# Patient Record
Sex: FEMALE | Race: WHITE | URBAN - METROPOLITAN AREA
[De-identification: names, ages, dates, MRNs, and addresses within clinical notes are randomized per-mention and may not be internally consistent; named-entity substitution may affect disease eponyms.]

---

## 2017-03-16 ENCOUNTER — OUTPATIENT (OUTPATIENT)
Dept: OUTPATIENT SERVICES | Facility: HOSPITAL | Age: 67
LOS: 1 days | Discharge: HOME | End: 2017-03-16

## 2017-06-27 DIAGNOSIS — S72.021A DISPLACED FRACTURE OF EPIPHYSIS (SEPARATION) (UPPER) OF RIGHT FEMUR, INITIAL ENCOUNTER FOR CLOSED FRACTURE: ICD-10-CM

## 2018-12-19 ENCOUNTER — OUTPATIENT (OUTPATIENT)
Dept: OUTPATIENT SERVICES | Facility: HOSPITAL | Age: 68
LOS: 1 days | Discharge: HOME | End: 2018-12-19

## 2018-12-19 VITALS
RESPIRATION RATE: 18 BRPM | HEART RATE: 68 BPM | DIASTOLIC BLOOD PRESSURE: 82 MMHG | TEMPERATURE: 97 F | WEIGHT: 171.96 LBS | HEIGHT: 68 IN | SYSTOLIC BLOOD PRESSURE: 179 MMHG | OXYGEN SATURATION: 97 %

## 2018-12-19 DIAGNOSIS — Z98.890 OTHER SPECIFIED POSTPROCEDURAL STATES: Chronic | ICD-10-CM

## 2018-12-19 DIAGNOSIS — Z01.818 ENCOUNTER FOR OTHER PREPROCEDURAL EXAMINATION: ICD-10-CM

## 2018-12-19 LAB
ALBUMIN SERPL ELPH-MCNC: 4.3 G/DL — SIGNIFICANT CHANGE UP (ref 3.5–5.2)
ALP SERPL-CCNC: 80 U/L — SIGNIFICANT CHANGE UP (ref 30–115)
ALT FLD-CCNC: 12 U/L — SIGNIFICANT CHANGE UP (ref 0–41)
ANION GAP SERPL CALC-SCNC: 18 MMOL/L — HIGH (ref 7–14)
APPEARANCE UR: CLEAR — SIGNIFICANT CHANGE UP
APTT BLD: 31.2 SEC — SIGNIFICANT CHANGE UP (ref 27–39.2)
AST SERPL-CCNC: 13 U/L — SIGNIFICANT CHANGE UP (ref 0–41)
BASOPHILS # BLD AUTO: 0.07 K/UL — SIGNIFICANT CHANGE UP (ref 0–0.2)
BASOPHILS NFR BLD AUTO: 0.8 % — SIGNIFICANT CHANGE UP (ref 0–1)
BILIRUB SERPL-MCNC: 0.3 MG/DL — SIGNIFICANT CHANGE UP (ref 0.2–1.2)
BILIRUB UR-MCNC: NEGATIVE — SIGNIFICANT CHANGE UP
BLD GP AB SCN SERPL QL: SIGNIFICANT CHANGE UP
BUN SERPL-MCNC: 11 MG/DL — SIGNIFICANT CHANGE UP (ref 10–20)
CALCIUM SERPL-MCNC: 9.2 MG/DL — SIGNIFICANT CHANGE UP (ref 8.5–10.1)
CHLORIDE SERPL-SCNC: 100 MMOL/L — SIGNIFICANT CHANGE UP (ref 98–110)
CO2 SERPL-SCNC: 22 MMOL/L — SIGNIFICANT CHANGE UP (ref 17–32)
COLOR SPEC: YELLOW — SIGNIFICANT CHANGE UP
CREAT SERPL-MCNC: 0.5 MG/DL — LOW (ref 0.7–1.5)
DIFF PNL FLD: ABNORMAL
EOSINOPHIL # BLD AUTO: 0.16 K/UL — SIGNIFICANT CHANGE UP (ref 0–0.7)
EOSINOPHIL NFR BLD AUTO: 1.8 % — SIGNIFICANT CHANGE UP (ref 0–8)
EPI CELLS # UR: ABNORMAL /HPF
ESTIMATED AVERAGE GLUCOSE: 163 MG/DL — HIGH (ref 68–114)
GLUCOSE SERPL-MCNC: 116 MG/DL — HIGH (ref 70–99)
GLUCOSE UR QL: NEGATIVE MG/DL — SIGNIFICANT CHANGE UP
HBA1C BLD-MCNC: 7.3 % — HIGH (ref 4–5.6)
HCT VFR BLD CALC: 36.9 % — LOW (ref 37–47)
HGB BLD-MCNC: 11.9 G/DL — LOW (ref 12–16)
IMM GRANULOCYTES NFR BLD AUTO: 0.3 % — SIGNIFICANT CHANGE UP (ref 0.1–0.3)
INR BLD: 1.05 RATIO — SIGNIFICANT CHANGE UP (ref 0.65–1.3)
KETONES UR-MCNC: NEGATIVE — SIGNIFICANT CHANGE UP
LEUKOCYTE ESTERASE UR-ACNC: ABNORMAL
LYMPHOCYTES # BLD AUTO: 2.05 K/UL — SIGNIFICANT CHANGE UP (ref 1.2–3.4)
LYMPHOCYTES # BLD AUTO: 22.8 % — SIGNIFICANT CHANGE UP (ref 20.5–51.1)
MCHC RBC-ENTMCNC: 23.7 PG — LOW (ref 27–31)
MCHC RBC-ENTMCNC: 32.2 G/DL — SIGNIFICANT CHANGE UP (ref 32–37)
MCV RBC AUTO: 73.4 FL — LOW (ref 81–99)
MONOCYTES # BLD AUTO: 0.54 K/UL — SIGNIFICANT CHANGE UP (ref 0.1–0.6)
MONOCYTES NFR BLD AUTO: 6 % — SIGNIFICANT CHANGE UP (ref 1.7–9.3)
MRSA PCR RESULT.: NEGATIVE — SIGNIFICANT CHANGE UP
NEUTROPHILS # BLD AUTO: 6.13 K/UL — SIGNIFICANT CHANGE UP (ref 1.4–6.5)
NEUTROPHILS NFR BLD AUTO: 68.3 % — SIGNIFICANT CHANGE UP (ref 42.2–75.2)
NITRITE UR-MCNC: NEGATIVE — SIGNIFICANT CHANGE UP
NRBC # BLD: 0 /100 WBCS — SIGNIFICANT CHANGE UP (ref 0–0)
PH UR: 6 — SIGNIFICANT CHANGE UP (ref 5–8)
PLATELET # BLD AUTO: 302 K/UL — SIGNIFICANT CHANGE UP (ref 130–400)
POTASSIUM SERPL-MCNC: 4.3 MMOL/L — SIGNIFICANT CHANGE UP (ref 3.5–5)
POTASSIUM SERPL-SCNC: 4.3 MMOL/L — SIGNIFICANT CHANGE UP (ref 3.5–5)
PROT SERPL-MCNC: 6.6 G/DL — SIGNIFICANT CHANGE UP (ref 6–8)
PROT UR-MCNC: 100 MG/DL
PROTHROM AB SERPL-ACNC: 12.1 SEC — SIGNIFICANT CHANGE UP (ref 9.95–12.87)
RBC # BLD: 5.03 M/UL — SIGNIFICANT CHANGE UP (ref 4.2–5.4)
RBC # FLD: 15.3 % — HIGH (ref 11.5–14.5)
RBC CASTS # UR COMP ASSIST: ABNORMAL /HPF
SODIUM SERPL-SCNC: 140 MMOL/L — SIGNIFICANT CHANGE UP (ref 135–146)
SP GR SPEC: 1.02 — SIGNIFICANT CHANGE UP (ref 1.01–1.03)
TYPE + AB SCN PNL BLD: SIGNIFICANT CHANGE UP
UROBILINOGEN FLD QL: 0.2 MG/DL — SIGNIFICANT CHANGE UP (ref 0.2–0.2)
WBC # BLD: 8.98 K/UL — SIGNIFICANT CHANGE UP (ref 4.8–10.8)
WBC # FLD AUTO: 8.98 K/UL — SIGNIFICANT CHANGE UP (ref 4.8–10.8)
WBC UR QL: SIGNIFICANT CHANGE UP /HPF

## 2018-12-19 NOTE — H&P PST ADULT - PSH
History of joint surgery  Right HIP ORIF  [facility] at [place]  History of tonsillectomy and adenoidectomy  1957 History of joint surgery  Right HIP ORIF  [facility] at [place]  History of tonsillectomy and adenoidectomy  1957

## 2018-12-19 NOTE — H&P PST ADULT - NSANTHOSAYNRD_GEN_A_CORE
No. DRAKE screening performed.  STOP BANG Legend: 0-2 = LOW Risk; 3-4 = INTERMEDIATE Risk; 5-8 = HIGH Risk

## 2018-12-19 NOTE — H&P PST ADULT - REASON FOR ADMISSION
67 yo f presents to PAST for right Hip removal of hardware conversion to Total Hip Replacement under GA on 1/2/2018 at [facility] by Dr. Dena Noel.

## 2018-12-19 NOTE — H&P PST ADULT - HISTORY OF PRESENT ILLNESS
Patient with h/o fall on 12/4/2016 and s/p ORIF done on 12/8/2016. Patient has been c/o progressively worsening same right Hip pain with limited ROM with swelling noted. Hip X michele shown severe DJD and scheduled for the above procedure. Denies any c/o cp, sob, palpitations fever, cough or dysuria. Ex tolerance of 1 fos walk with out sob except right Hip pains. Patient ambulate with cane and walker. No DRAKE.

## 2018-12-20 LAB
CULTURE RESULTS: NO GROWTH — SIGNIFICANT CHANGE UP
SPECIMEN SOURCE: SIGNIFICANT CHANGE UP

## 2019-01-02 ENCOUNTER — INPATIENT (INPATIENT)
Facility: HOSPITAL | Age: 69
LOS: 1 days | Discharge: HOME HEALTH | End: 2019-01-04
Attending: ORTHOPAEDIC SURGERY | Admitting: ORTHOPAEDIC SURGERY

## 2019-01-02 VITALS
HEIGHT: 68 IN | DIASTOLIC BLOOD PRESSURE: 89 MMHG | WEIGHT: 171.08 LBS | HEART RATE: 68 BPM | RESPIRATION RATE: 18 BRPM | SYSTOLIC BLOOD PRESSURE: 184 MMHG | TEMPERATURE: 98 F

## 2019-01-02 DIAGNOSIS — Z98.890 OTHER SPECIFIED POSTPROCEDURAL STATES: Chronic | ICD-10-CM

## 2019-01-02 LAB
ANION GAP SERPL CALC-SCNC: 16 MMOL/L — HIGH (ref 7–14)
BUN SERPL-MCNC: 12 MG/DL — SIGNIFICANT CHANGE UP (ref 10–20)
CALCIUM SERPL-MCNC: 8.7 MG/DL — SIGNIFICANT CHANGE UP (ref 8.5–10.1)
CHLORIDE SERPL-SCNC: 99 MMOL/L — SIGNIFICANT CHANGE UP (ref 98–110)
CO2 SERPL-SCNC: 23 MMOL/L — SIGNIFICANT CHANGE UP (ref 17–32)
CREAT SERPL-MCNC: 0.6 MG/DL — LOW (ref 0.7–1.5)
GLUCOSE SERPL-MCNC: 159 MG/DL — HIGH (ref 70–99)
HCT VFR BLD CALC: 33.5 % — LOW (ref 37–47)
HGB BLD-MCNC: 10.8 G/DL — LOW (ref 12–16)
MCHC RBC-ENTMCNC: 23.7 PG — LOW (ref 27–31)
MCHC RBC-ENTMCNC: 32.2 G/DL — SIGNIFICANT CHANGE UP (ref 32–37)
MCV RBC AUTO: 73.5 FL — LOW (ref 81–99)
NRBC # BLD: 0 /100 WBCS — SIGNIFICANT CHANGE UP (ref 0–0)
PLATELET # BLD AUTO: 284 K/UL — SIGNIFICANT CHANGE UP (ref 130–400)
POTASSIUM SERPL-MCNC: 4.6 MMOL/L — SIGNIFICANT CHANGE UP (ref 3.5–5)
POTASSIUM SERPL-SCNC: 4.6 MMOL/L — SIGNIFICANT CHANGE UP (ref 3.5–5)
RBC # BLD: 4.56 M/UL — SIGNIFICANT CHANGE UP (ref 4.2–5.4)
RBC # FLD: 14.3 % — SIGNIFICANT CHANGE UP (ref 11.5–14.5)
SODIUM SERPL-SCNC: 138 MMOL/L — SIGNIFICANT CHANGE UP (ref 135–146)
WBC # BLD: 10.37 K/UL — SIGNIFICANT CHANGE UP (ref 4.8–10.8)
WBC # FLD AUTO: 10.37 K/UL — SIGNIFICANT CHANGE UP (ref 4.8–10.8)

## 2019-01-02 RX ORDER — GLUCAGON INJECTION, SOLUTION 0.5 MG/.1ML
1 INJECTION, SOLUTION SUBCUTANEOUS ONCE
Qty: 0 | Refills: 0 | Status: DISCONTINUED | OUTPATIENT
Start: 2019-01-02 | End: 2019-01-04

## 2019-01-02 RX ORDER — SODIUM CHLORIDE 9 MG/ML
1000 INJECTION, SOLUTION INTRAVENOUS
Qty: 0 | Refills: 0 | Status: DISCONTINUED | OUTPATIENT
Start: 2019-01-02 | End: 2019-01-02

## 2019-01-02 RX ORDER — ACETAMINOPHEN 500 MG
650 TABLET ORAL EVERY 6 HOURS
Qty: 0 | Refills: 0 | Status: DISCONTINUED | OUTPATIENT
Start: 2019-01-02 | End: 2019-01-04

## 2019-01-02 RX ORDER — SENNA PLUS 8.6 MG/1
2 TABLET ORAL AT BEDTIME
Qty: 0 | Refills: 0 | Status: DISCONTINUED | OUTPATIENT
Start: 2019-01-02 | End: 2019-01-04

## 2019-01-02 RX ORDER — METFORMIN HYDROCHLORIDE 850 MG/1
1 TABLET ORAL
Qty: 0 | Refills: 0 | COMMUNITY

## 2019-01-02 RX ORDER — HYDROCHLOROTHIAZIDE 25 MG
25 TABLET ORAL DAILY
Qty: 0 | Refills: 0 | Status: DISCONTINUED | OUTPATIENT
Start: 2019-01-02 | End: 2019-01-04

## 2019-01-02 RX ORDER — DEXTROSE 50 % IN WATER 50 %
15 SYRINGE (ML) INTRAVENOUS ONCE
Qty: 0 | Refills: 0 | Status: DISCONTINUED | OUTPATIENT
Start: 2019-01-02 | End: 2019-01-04

## 2019-01-02 RX ORDER — GABAPENTIN 400 MG/1
100 CAPSULE ORAL THREE TIMES A DAY
Qty: 0 | Refills: 0 | Status: DISCONTINUED | OUTPATIENT
Start: 2019-01-02 | End: 2019-01-04

## 2019-01-02 RX ORDER — MAGNESIUM HYDROXIDE 400 MG/1
30 TABLET, CHEWABLE ORAL DAILY
Qty: 0 | Refills: 0 | Status: DISCONTINUED | OUTPATIENT
Start: 2019-01-02 | End: 2019-01-04

## 2019-01-02 RX ORDER — ONDANSETRON 8 MG/1
4 TABLET, FILM COATED ORAL ONCE
Qty: 0 | Refills: 0 | Status: DISCONTINUED | OUTPATIENT
Start: 2019-01-02 | End: 2019-01-02

## 2019-01-02 RX ORDER — DEXTROSE 50 % IN WATER 50 %
25 SYRINGE (ML) INTRAVENOUS ONCE
Qty: 0 | Refills: 0 | Status: DISCONTINUED | OUTPATIENT
Start: 2019-01-02 | End: 2019-01-04

## 2019-01-02 RX ORDER — POLYETHYLENE GLYCOL 3350 17 G/17G
17 POWDER, FOR SOLUTION ORAL DAILY
Qty: 0 | Refills: 0 | Status: DISCONTINUED | OUTPATIENT
Start: 2019-01-02 | End: 2019-01-04

## 2019-01-02 RX ORDER — HYDROMORPHONE HYDROCHLORIDE 2 MG/ML
0.5 INJECTION INTRAMUSCULAR; INTRAVENOUS; SUBCUTANEOUS EVERY 4 HOURS
Qty: 0 | Refills: 0 | Status: DISCONTINUED | OUTPATIENT
Start: 2019-01-02 | End: 2019-01-04

## 2019-01-02 RX ORDER — METOPROLOL TARTRATE 50 MG
100 TABLET ORAL DAILY
Qty: 0 | Refills: 0 | Status: DISCONTINUED | OUTPATIENT
Start: 2019-01-02 | End: 2019-01-04

## 2019-01-02 RX ORDER — METOPROLOL TARTRATE 50 MG
1 TABLET ORAL
Qty: 0 | Refills: 0 | COMMUNITY

## 2019-01-02 RX ORDER — INSULIN LISPRO 100/ML
VIAL (ML) SUBCUTANEOUS
Qty: 0 | Refills: 0 | Status: DISCONTINUED | OUTPATIENT
Start: 2019-01-02 | End: 2019-01-04

## 2019-01-02 RX ORDER — PANTOPRAZOLE SODIUM 20 MG/1
40 TABLET, DELAYED RELEASE ORAL
Qty: 0 | Refills: 0 | Status: DISCONTINUED | OUTPATIENT
Start: 2019-01-02 | End: 2019-01-04

## 2019-01-02 RX ORDER — OXYCODONE HYDROCHLORIDE 5 MG/1
5 TABLET ORAL EVERY 4 HOURS
Qty: 0 | Refills: 0 | Status: DISCONTINUED | OUTPATIENT
Start: 2019-01-02 | End: 2019-01-04

## 2019-01-02 RX ORDER — SIMVASTATIN 20 MG/1
10 TABLET, FILM COATED ORAL AT BEDTIME
Qty: 0 | Refills: 0 | Status: DISCONTINUED | OUTPATIENT
Start: 2019-01-02 | End: 2019-01-04

## 2019-01-02 RX ORDER — ONDANSETRON 8 MG/1
4 TABLET, FILM COATED ORAL EVERY 6 HOURS
Qty: 0 | Refills: 0 | Status: DISCONTINUED | OUTPATIENT
Start: 2019-01-02 | End: 2019-01-04

## 2019-01-02 RX ORDER — SODIUM CHLORIDE 9 MG/ML
1000 INJECTION, SOLUTION INTRAVENOUS
Qty: 0 | Refills: 0 | Status: DISCONTINUED | OUTPATIENT
Start: 2019-01-02 | End: 2019-01-03

## 2019-01-02 RX ORDER — SODIUM CHLORIDE 9 MG/ML
1000 INJECTION, SOLUTION INTRAVENOUS
Qty: 0 | Refills: 0 | Status: DISCONTINUED | OUTPATIENT
Start: 2019-01-02 | End: 2019-01-04

## 2019-01-02 RX ORDER — CEFAZOLIN SODIUM 1 G
2000 VIAL (EA) INJECTION EVERY 8 HOURS
Qty: 0 | Refills: 0 | Status: COMPLETED | OUTPATIENT
Start: 2019-01-02 | End: 2019-01-02

## 2019-01-02 RX ORDER — DEXTROSE 50 % IN WATER 50 %
12.5 SYRINGE (ML) INTRAVENOUS ONCE
Qty: 0 | Refills: 0 | Status: DISCONTINUED | OUTPATIENT
Start: 2019-01-02 | End: 2019-01-04

## 2019-01-02 RX ORDER — HYDROMORPHONE HYDROCHLORIDE 2 MG/ML
0.5 INJECTION INTRAMUSCULAR; INTRAVENOUS; SUBCUTANEOUS
Qty: 0 | Refills: 0 | Status: DISCONTINUED | OUTPATIENT
Start: 2019-01-02 | End: 2019-01-02

## 2019-01-02 RX ORDER — OXYCODONE HYDROCHLORIDE 5 MG/1
10 TABLET ORAL EVERY 4 HOURS
Qty: 0 | Refills: 0 | Status: DISCONTINUED | OUTPATIENT
Start: 2019-01-02 | End: 2019-01-04

## 2019-01-02 RX ORDER — ASPIRIN/CALCIUM CARB/MAGNESIUM 324 MG
81 TABLET ORAL
Qty: 0 | Refills: 0 | Status: DISCONTINUED | OUTPATIENT
Start: 2019-01-02 | End: 2019-01-04

## 2019-01-02 RX ORDER — HYDROMORPHONE HYDROCHLORIDE 2 MG/ML
1 INJECTION INTRAMUSCULAR; INTRAVENOUS; SUBCUTANEOUS
Qty: 0 | Refills: 0 | Status: DISCONTINUED | OUTPATIENT
Start: 2019-01-02 | End: 2019-01-02

## 2019-01-02 RX ORDER — DOCUSATE SODIUM 100 MG
100 CAPSULE ORAL THREE TIMES A DAY
Qty: 0 | Refills: 0 | Status: DISCONTINUED | OUTPATIENT
Start: 2019-01-02 | End: 2019-01-04

## 2019-01-02 RX ADMIN — Medication 100 MILLIGRAM(S): at 14:24

## 2019-01-02 RX ADMIN — Medication 25 MILLIGRAM(S): at 14:24

## 2019-01-02 RX ADMIN — GABAPENTIN 100 MILLIGRAM(S): 400 CAPSULE ORAL at 14:07

## 2019-01-02 RX ADMIN — Medication 100 MILLIGRAM(S): at 21:17

## 2019-01-02 RX ADMIN — SIMVASTATIN 10 MILLIGRAM(S): 20 TABLET, FILM COATED ORAL at 21:17

## 2019-01-02 RX ADMIN — Medication 100 MILLIGRAM(S): at 14:07

## 2019-01-02 RX ADMIN — Medication 81 MILLIGRAM(S): at 17:02

## 2019-01-02 RX ADMIN — SODIUM CHLORIDE 100 MILLILITER(S): 9 INJECTION, SOLUTION INTRAVENOUS at 13:26

## 2019-01-02 RX ADMIN — Medication 650 MILLIGRAM(S): at 23:40

## 2019-01-02 RX ADMIN — SENNA PLUS 2 TABLET(S): 8.6 TABLET ORAL at 21:17

## 2019-01-02 RX ADMIN — HYDROMORPHONE HYDROCHLORIDE 1 MILLIGRAM(S): 2 INJECTION INTRAMUSCULAR; INTRAVENOUS; SUBCUTANEOUS at 13:15

## 2019-01-02 RX ADMIN — HYDROMORPHONE HYDROCHLORIDE 1 MILLIGRAM(S): 2 INJECTION INTRAMUSCULAR; INTRAVENOUS; SUBCUTANEOUS at 13:00

## 2019-01-02 RX ADMIN — GABAPENTIN 100 MILLIGRAM(S): 400 CAPSULE ORAL at 21:17

## 2019-01-02 RX ADMIN — Medication 1: at 17:02

## 2019-01-02 NOTE — PATIENT PROFILE ADULT - FALL HARM RISK
surgery/bones(Osteoporosis,prev fx,steroid use,metastatic bone ca)/coagulation(Bleeding disorder R/T clinical cond/anti-coags)

## 2019-01-02 NOTE — BRIEF OPERATIVE NOTE - PROCEDURE
<<-----Click on this checkbox to enter Procedure Total hip [facility]  01[facility]02/2019    [facility]  [facility]  01/02/2019    Active  [facility]

## 2019-01-02 NOTE — CHART NOTE - NSCHARTNOTEFT_GEN_A_CORE
PACU ANESTHESIA ADMISSION NOTE        __x__  Patent Airway    __x__  Full return of protective reflexes    __x__  Full recovery from anesthesia / back to baseline status      Mental Status:  __x__ Awake   ___x__ Alert   _____ Drowsy   _____ Sedated    Nausea/Vomiting:  __x__ NO  ______Yes,   See Post - Op Orders          Pain Scale (0-10):  _____    Treatment: ____ None    __x__ See Post - Op/PCA Orders    Post - Operative Fluids:   ____ Oral   __x__ See Post - Op Orders    Plan: Discharge:   ____Home       ____x_Floor     _____Critical Care    _____  Other:_________________    Comments: Patient had smooth intraoperative event, no anesthesia complication.  PACU Vital signs: HR:   92         BP:     148   /92          RR:      16       O2 Sat:     97  %     Temp 36

## 2019-01-02 NOTE — BRIEF OPERATIVE NOTE - COMMENTS
depuy implants, 52 cup, 6.5mm bone screw, 36 neutral liner, size 5 actis stem std offset, 36 +8.5 femoral head

## 2019-01-02 NOTE — ASU PATIENT PROFILE, ADULT - PSH
History of joint surgery  Right HIP ORIF  [facility] at [place]  History of tonsillectomy and adenoidectomy  1957

## 2019-01-02 NOTE — PROGRESS NOTE ADULT - ASSESSMENT
ORTHO POST OP CHECK    67 yo F s/p R hip removal of hardware, total hip arthroplasty. Patient seen and examined at bedside post-procedure. she is doing well with pain well controlled. Denies f/c/cp/sob/n/t/nausea/vomiting.    vitals: AF, VSS    PE:  NAD  unlabored resp  RLE:  dressings c/d/i  thigh and calf soft, compressible  SILT sp/dp/s/s/t  Motor intact ehl/fhl/ta/gs  foot wwp, cr <2s    H/H: 11.9/36.9    A/P: 67 yo F s/p R hip removal of hardware, total hip arthroplasty  - pain control  - IV Ancef x 24 hours post-op  - DVT ppx: chem ppx, foot pumps  - f/u AM labs  - WBAT RLE  - PT/OT/OOB  - IS  - diet  - Care per Ortho  - Dispo/SW

## 2019-01-03 ENCOUNTER — TRANSCRIPTION ENCOUNTER (OUTPATIENT)
Age: 69
End: 2019-01-03

## 2019-01-03 LAB
ANION GAP SERPL CALC-SCNC: 16 MMOL/L — HIGH (ref 7–14)
BUN SERPL-MCNC: 10 MG/DL — SIGNIFICANT CHANGE UP (ref 10–20)
CALCIUM SERPL-MCNC: 9.1 MG/DL — SIGNIFICANT CHANGE UP (ref 8.5–10.1)
CHLORIDE SERPL-SCNC: 94 MMOL/L — LOW (ref 98–110)
CO2 SERPL-SCNC: 26 MMOL/L — SIGNIFICANT CHANGE UP (ref 17–32)
CREAT SERPL-MCNC: 0.6 MG/DL — LOW (ref 0.7–1.5)
GLUCOSE SERPL-MCNC: 136 MG/DL — HIGH (ref 70–99)
HCT VFR BLD CALC: 30.1 % — LOW (ref 37–47)
HGB BLD-MCNC: 9.7 G/DL — LOW (ref 12–16)
MCHC RBC-ENTMCNC: 23.5 PG — LOW (ref 27–31)
MCHC RBC-ENTMCNC: 32.2 G/DL — SIGNIFICANT CHANGE UP (ref 32–37)
MCV RBC AUTO: 72.9 FL — LOW (ref 81–99)
NRBC # BLD: 0 /100 WBCS — SIGNIFICANT CHANGE UP (ref 0–0)
PLATELET # BLD AUTO: 287 K/UL — SIGNIFICANT CHANGE UP (ref 130–400)
POTASSIUM SERPL-MCNC: 4.1 MMOL/L — SIGNIFICANT CHANGE UP (ref 3.5–5)
POTASSIUM SERPL-SCNC: 4.1 MMOL/L — SIGNIFICANT CHANGE UP (ref 3.5–5)
RBC # BLD: 4.13 M/UL — LOW (ref 4.2–5.4)
RBC # FLD: 14.3 % — SIGNIFICANT CHANGE UP (ref 11.5–14.5)
SODIUM SERPL-SCNC: 136 MMOL/L — SIGNIFICANT CHANGE UP (ref 135–146)
WBC # BLD: 9.54 K/UL — SIGNIFICANT CHANGE UP (ref 4.8–10.8)
WBC # FLD AUTO: 9.54 K/UL — SIGNIFICANT CHANGE UP (ref 4.8–10.8)

## 2019-01-03 RX ADMIN — Medication 81 MILLIGRAM(S): at 05:12

## 2019-01-03 RX ADMIN — Medication 3: at 17:15

## 2019-01-03 RX ADMIN — Medication 1 TABLET(S): at 11:40

## 2019-01-03 RX ADMIN — PANTOPRAZOLE SODIUM 40 MILLIGRAM(S): 20 TABLET, DELAYED RELEASE ORAL at 05:13

## 2019-01-03 RX ADMIN — POLYETHYLENE GLYCOL 3350 17 GRAM(S): 17 POWDER, FOR SOLUTION ORAL at 11:41

## 2019-01-03 RX ADMIN — GABAPENTIN 100 MILLIGRAM(S): 400 CAPSULE ORAL at 05:12

## 2019-01-03 RX ADMIN — Medication 3: at 11:39

## 2019-01-03 RX ADMIN — Medication 650 MILLIGRAM(S): at 17:15

## 2019-01-03 RX ADMIN — Medication 650 MILLIGRAM(S): at 05:13

## 2019-01-03 RX ADMIN — Medication 650 MILLIGRAM(S): at 17:23

## 2019-01-03 RX ADMIN — Medication 1: at 08:42

## 2019-01-03 RX ADMIN — GABAPENTIN 100 MILLIGRAM(S): 400 CAPSULE ORAL at 21:30

## 2019-01-03 RX ADMIN — Medication 100 MILLIGRAM(S): at 05:14

## 2019-01-03 RX ADMIN — SIMVASTATIN 10 MILLIGRAM(S): 20 TABLET, FILM COATED ORAL at 21:30

## 2019-01-03 RX ADMIN — Medication 650 MILLIGRAM(S): at 11:41

## 2019-01-03 RX ADMIN — Medication 100 MILLIGRAM(S): at 05:12

## 2019-01-03 RX ADMIN — Medication 100 MILLIGRAM(S): at 13:37

## 2019-01-03 RX ADMIN — Medication 650 MILLIGRAM(S): at 11:40

## 2019-01-03 RX ADMIN — Medication 25 MILLIGRAM(S): at 05:12

## 2019-01-03 RX ADMIN — Medication 100 MILLIGRAM(S): at 21:30

## 2019-01-03 RX ADMIN — Medication 81 MILLIGRAM(S): at 17:16

## 2019-01-03 RX ADMIN — SENNA PLUS 2 TABLET(S): 8.6 TABLET ORAL at 21:30

## 2019-01-03 RX ADMIN — GABAPENTIN 100 MILLIGRAM(S): 400 CAPSULE ORAL at 13:37

## 2019-01-03 NOTE — PHYSICAL THERAPY INITIAL EVALUATION ADULT - CRITERIA FOR SKILLED THERAPEUTIC INTERVENTIONS
therapy frequency/rehab potential/impairments found/functional limitations in following categories/risk reduction/prevention

## 2019-01-03 NOTE — PROGRESS NOTE ADULT - SUBJECTIVE AND OBJECTIVE BOX
ORTHO PROGRESS NOTE       68yFemale POD # 1      S/P shandra/ right ABIMBOLA    Patient seen and examined at bedside . The patient is awake and alert in NAD. No complaints of chest pain, SOB, N/V.    Vital Signs Last 24 Hrs  T(C): 36.7 (03 Jan 2019 04:00), Max: 36.9 (02 Jan 2019 08:50)  T(F): 98.1 (03 Jan 2019 04:00), Max: 98.4 (02 Jan 2019 08:50)  HR: 80 (03 Jan 2019 05:00) (63 - 92)  BP: 139/64 (03 Jan 2019 05:00) (121/58 - 184/89)  BP(mean): --  RR: 18 (03 Jan 2019 04:00) (13 - 20)  SpO2: 98% (02 Jan 2019 14:28) (96% - 99%)                          10.8   10.37 )-----------( 284      ( 02 Jan 2019 13:15 )             33.5     01-02    138  |  99  |  12  ----------------------------<  159<H>  4.6   |  23  |  0.6<L>    Ca    8.7      02 Jan 2019 13:15            DVT ppx : aspirin     MEDICATIONS  (STANDING):  acetaminophen   Tablet .. 650 milliGRAM(s) Oral every 6 hours  aspirin enteric coated 81 milliGRAM(s) Oral two times a day  dextrose 5%. 1000 milliLiter(s) (50 mL/Hr) IV Continuous <Continuous>  dextrose 50% Injectable 12.5 Gram(s) IV Push once  dextrose 50% Injectable 25 Gram(s) IV Push once  dextrose 50% Injectable 25 Gram(s) IV Push once  docusate sodium 100 milliGRAM(s) Oral three times a day  gabapentin 100 milliGRAM(s) Oral three times a day  hydrochlorothiazide 25 milliGRAM(s) Oral daily  insulin lispro (HumaLOG) corrective regimen sliding scale   SubCutaneous three times a day before meals  lactated ringers. 1000 milliLiter(s) (100 mL/Hr) IV Continuous <Continuous>  metoprolol succinate  milliGRAM(s) Oral daily  multivitamin 1 Tablet(s) Oral daily  pantoprazole    Tablet 40 milliGRAM(s) Oral before breakfast  polyethylene glycol 3350 17 Gram(s) Oral daily  senna 2 Tablet(s) Oral at bedtime  simvastatin 10 milliGRAM(s) Oral at bedtime    MEDICATIONS  (PRN):  aluminum hydroxide/magnesium hydroxide/simethicone Suspension 30 milliLiter(s) Oral four times a day PRN Indigestion  dextrose 40% Gel 15 Gram(s) Oral once PRN Blood Glucose LESS THAN 70 milliGRAM(s)/deciliter  glucagon  Injectable 1 milliGRAM(s) IntraMuscular once PRN Glucose LESS THAN 70 milligrams/deciliter  HYDROmorphone  Injectable 0.5 milliGRAM(s) IV Push every 4 hours PRN Severe Pain (7 - 10)  magnesium hydroxide Suspension 30 milliLiter(s) Oral daily PRN Constipation  ondansetron Injectable 4 milliGRAM(s) IV Push every 6 hours PRN Nausea and/or Vomiting  oxyCODONE    IR 5 milliGRAM(s) Oral every 4 hours PRN Mild Pain (1 - 3)  oxyCODONE    IR 10 milliGRAM(s) Oral every 4 hours PRN Moderate Pain (4 - 6)  senna 2 Tablet(s) Oral at bedtime PRN Constipation      PE:   right hip dressing C/D/I          Compartments soft         NVI, SILT           A/P: 68yFemale POD # 1    S/P  shandra/ right abimbola            OOB to Chair            Physical Therapy- TTWB right le           Pain control            Incentive Spirometry            DVT Prophylaxis            Discharge planning

## 2019-01-03 NOTE — PHYSICAL THERAPY INITIAL EVALUATION ADULT - GENERAL OBSERVATIONS, REHAB EVAL
10:45-11:20. Pt encountered sitting in chair in NAD, + chair alarm,+R hip dressing clean and intact, +bedpan. PT assisted pt off bedpan. Pt agreeable to PT.

## 2019-01-03 NOTE — CONSULT NOTE ADULT - SUBJECTIVE AND OBJECTIVE BOX
Patient is a 68y old  Female who presents with a chief complaint of  R Hip Pain- failed CRPP R Femoral Fx with OA  HPI: SP R THR on 1/2      PAST MEDICAL & SURGICAL HISTORY:  OA (osteoarthritis)  HTN (hypertension)  Hypercholesteremia  DM (diabetes mellitus)  History of tonsillectomy and adenoidectomy: 1957  History of joint surgery: Right HIP ORIF  [facility] Course: Frequency urination/ sl incontinence post op- Also TTWB RLE    TODAY'S SUBJECTIVE & REVIEW OF SYMPTOMS:     Constitutional WNL   Cardio WNL   Resp WNL   GI WNL  Heme WNL  Endo WNL  Skin WNL  MSK WNL  Neuro WNL  Cognitive WNL  Psych WNL      MEDICATIONS  (STANDING):  acetaminophen   Tablet .. 650 milliGRAM(s) Oral every 6 hours  aspirin enteric coated 81 milliGRAM(s) Oral two times a day  dextrose 5%. 1000 milliLiter(s) (50 mL/Hr) IV Continuous <Continuous>  dextrose 50% Injectable 12.5 Gram(s) IV Push once  dextrose 50% Injectable 25 Gram(s) IV Push once  dextrose 50% Injectable 25 Gram(s) IV Push once  docusate sodium 100 milliGRAM(s) Oral three times a day  gabapentin 100 milliGRAM(s) Oral three times a day  hydrochlorothiazide 25 milliGRAM(s) Oral daily  insulin lispro (HumaLOG) corrective regimen sliding scale   SubCutaneous three times a day before meals  metoprolol succinate  milliGRAM(s) Oral daily  multivitamin 1 Tablet(s) Oral daily  pantoprazole    Tablet 40 milliGRAM(s) Oral before breakfast  polyethylene glycol 3350 17 Gram(s) Oral daily  senna 2 Tablet(s) Oral at bedtime  simvastatin 10 milliGRAM(s) Oral at bedtime    MEDICATIONS  (PRN):  aluminum hydroxide/magnesium hydroxide/simethicone Suspension 30 milliLiter(s) Oral four times a day PRN Indigestion  dextrose 40% Gel 15 Gram(s) Oral once PRN Blood Glucose LESS THAN 70 milliGRAM(s)/deciliter  glucagon  Injectable 1 milliGRAM(s) IntraMuscular once PRN Glucose LESS THAN 70 milligrams/deciliter  HYDROmorphone  Injectable 0.5 milliGRAM(s) IV Push every 4 hours PRN Severe Pain (7 - 10)  magnesium hydroxide Suspension 30 milliLiter(s) Oral daily PRN Constipation  ondansetron Injectable 4 milliGRAM(s) IV Push every 6 hours PRN Nausea and/or Vomiting  oxyCODONE    IR 5 milliGRAM(s) Oral every 4 hours PRN Mild Pain (1 - 3)  oxyCODONE    IR 10 milliGRAM(s) Oral every 4 hours PRN Moderate Pain (4 - 6)  senna 2 Tablet(s) Oral at bedtime PRN Constipation      FAMILY HISTORY:  HTN (hypertension) (Father, Mother)      Allergies    No Known Allergies    Intolerances        SOCIAL HISTORY:    [    ] Etoh  [  x  ] Smoking EX  [    ] Substance abuse     Home Environment:  [x    ] Home Alone  [    ] Lives with Family  [    ] Home Health Aid    Dwelling:  [    ] Apartment  [ x   ] Private House  [    ] Adult Home  [    ] Skilled Nursing Facility      [    ] Short Term  [    ] Long Term  [ x   ] Stairs                           [    ] Elevator     FUNCTIONAL STATUS PTA: (Check all that apply)  Ambulation: [   x  ]Independent    [    ] Dependent     [    ] Non-Ambulatory  Assistive Device: [ x   ] SA Cane  [    ]  Q Cane  [    ] Walker  [    ]  Wheelchair  ADL : [  x  ] Independent  [    ]  Dependent       Vital Signs Last 24 Hrs  T(C): 36.9 (03 Jan 2019 08:00), Max: 36.9 (03 Jan 2019 08:00)  T(F): 98.4 (03 Jan 2019 08:00), Max: 98.4 (03 Jan 2019 08:00)  HR: 71 (03 Jan 2019 08:00) (63 - 92)  BP: 123/59 (03 Jan 2019 08:00) (121/58 - 169/80)  BP(mean): --  RR: 18 (03 Jan 2019 08:00) (13 - 20)  SpO2: 98% (02 Jan 2019 14:28) (96% - 99%)      PHYSICAL EXAM: Alert & Oriented X3  GENERAL: NAD, well-groomed, well-developed  HEAD:  Atraumatic, Normocephalic  EYES: EOMI, PERRLA, conjunctiva and sclera clear  NECK: Supple, No JVD, Normal thyroid  CHEST/LUNG: Clear bilaterally; No rales, rhonchi, wheezing, or rubs  HEART: Regular rate and rhythm; No murmurs, rubs, or gallops  ABDOMEN: Soft, sl tender suprapubically, Nondistended; Bowel sounds present  EXTREMITIES: R hip bandaged no calf tenderness    NERVOUS SYSTEM:  Cranial Nerves 2-12 intact [ x   ] Abnormal  [    ]  ROM: WFL all extremities [    ]  Abnormal [ x    ]R hip Limited secondary to  pain  Motor Strength: WFL all extremities  [    ]  Abnormal [ x   ]R hip 2-3/5 otherwise 5/5  Sensation: intact to light touch [ x   ] Abnormal [    ]      FUNCTIONAL STATUS:  Bed Mobility: [   ]  Independent [    ]  Supervision [   x ]  Needs Assistance [  ]  N/A  Transfers: [    ]  Independent [    ]  Supervision [   x ]  Needs Assistance [    ]  N/A    Ambulation:  [    ]  Independent [    ]  Supervision [  x  ]  Needs Assistance [    ]  N/A   ADL:  [    ]   Independent [   x ] Requires Assistance [    ] N/A       LABS:                        9.7    9.54  )-----------( 287      ( 03 Jan 2019 05:40 )             30.1     01-03    136  |  94<L>  |  10  ----------------------------<  136<H>  4.1   |  26  |  0.6<L>    Ca    9.1      03 Jan 2019 05:40            RADIOLOGY & ADDITIONAL STUDIES:

## 2019-01-03 NOTE — DISCHARGE NOTE ADULT - PATIENT PORTAL LINK FT
You can access the [email or web address], offered by [facility], by registering with the following website: [email or web address]

## 2019-01-03 NOTE — OCCUPATIONAL THERAPY INITIAL EVALUATION ADULT - LIVES WITH, PROFILE
private town home, no IVETH, 5 stairs to first level (Den/bathroom), 1 flight of stair to next level (kitchen/living room/no bathroom) 3rd flight of stairs to bedroom and upstairs bathroom/alone

## 2019-01-03 NOTE — OCCUPATIONAL THERAPY INITIAL EVALUATION ADULT - GENERAL OBSERVATIONS, REHAB EVAL
pt received seated in b/s chair in NAD, agreeable to OT evaluation. +IV Lock, left seated in b/s chair in NAD

## 2019-01-03 NOTE — OCCUPATIONAL THERAPY INITIAL EVALUATION ADULT - TRANSFER SAFETY CONCERNS NOTED: BED/CHAIR, REHAB EVAL
losing balance/stand pivot/decreased step length/decreased balance during turns/decreased weight-shifting ability

## 2019-01-03 NOTE — PHYSICAL THERAPY INITIAL EVALUATION ADULT - MANUAL MUSCLE TESTING RESULTS, REHAB EVAL
B UE's 4/5. R LE: hip flex 3-/5, knee flex/ext 3/5, ankle DF 3/5. NT test further 2* to TTWB RLE. L LE at least 4/5

## 2019-01-03 NOTE — OCCUPATIONAL THERAPY INITIAL EVALUATION ADULT - PLANNED THERAPY INTERVENTIONS, OT EVAL
joint mobilization/ROM/parent/caregiver training.../strengthening/stretching/balance training/ADL retraining/bed mobility training/massage/transfer training

## 2019-01-03 NOTE — DISCHARGE NOTE ADULT - CARE PROVIDER_API CALL
Jordan Wise), Orthopaedic Surgery  [facility] [place] [address]  Phone: [phone]  Fax: [phone]

## 2019-01-03 NOTE — DISCHARGE NOTE ADULT - MEDICATION SUMMARY - MEDICATIONS TO TAKE
I will START or STAY ON the medications listed below when I get home from the hospital:    Zocor with Ezatimibe 10/10mg  -- 1 tab(s) by mouth once a day (at bedtime)  -- Indication: For home med    aspirin 81 mg oral delayed release tablet  -- 1 tab(s) by mouth 2 times a day  -- Indication: For dvt prevention    oxyCODONE 5 mg oral tablet  -- 1 tab(s) by mouth every 4 hours, As needed, Mild Pain (1 - 3) MDD:6  -- Indication: For pain    gabapentin 100 mg oral capsule  -- 1 cap(s) by mouth 3 times a day  -- Indication: For pain    metFORMIN 1000 mg oral tablet  -- 1 tab(s) by mouth 2 times a day  -- Indication: For home med    metoprolol succinate 100 mg oral tablet, extended release  -- 1 tab(s) by mouth once a day  -- Indication: For home med    hydroCHLOROthiazide 25 mg oral tablet  -- 1 tab(s) by mouth once a day  -- Indication: For home med

## 2019-01-03 NOTE — CONSULT NOTE ADULT - ASSESSMENT
IMPRESSION: Rehab of R THR    PRECAUTIONS: [    ] Cardiac  [    ] Respiratory  [    ] Seizures [    ] Contact Isolation  [    ] Droplet Isolation  [    ] Other    Weight Bearing Status:  TTWB RLE    RECOMMENDATION: Watch for Retention- ? Post Void residual    Out of Bed to Chair     DVT/Decubiti Prophylaxis    REHAB PLAN:     [  x   ] Bedside P/T 3-5 times a week   [  x   ] Bedside O/T  2-3 times a week   [     ] No Rehab Therapy Indicated   [     ]  Speech Therapy   Conditioning/ROM                                 ADL  Bed Mobility                                            Conditioning/ROM  Transfers                                                  Bed Mobility  Sitting /Standing Balance                      Transfers                                        Gait Training                                            Sitting/Standing Balance  Stair Training [   ]Applicable                 Home equipment Eval                                                                     Splinting  [   ] Only      GOALS:   ADL   [ x   ]   Independent         Transfers  [  x  ] Independent            Ambulation  [   x  ] Independent     [ x    ] With device                            [    ]  CG                                               [    ]  CG                                                    [     ] CG                            [    ] Min A                                          [    ] Min A                                                [     ] Min  A          DISCHARGE PLAN:   [     ]  Good candidate for Intensive Rehabilitation/Hospital based                                             Will tolerate 3hrs Intensive Rehab Daily                                       [      ]  Short Term Rehab in Skilled Nursing Facility                                       [   x   ]  Home with Outpatient or  services                                         [      ]  Possible Candidate for Intensive Hospital based Rehab

## 2019-01-03 NOTE — DISCHARGE NOTE ADULT - CARE PLAN
Principal Discharge DX:	OA (osteoarthritis)  Goal:	return to previous level of independence  Assessment and plan of treatment:	physical therapy, ttwb

## 2019-01-03 NOTE — DISCHARGE NOTE ADULT - ADDITIONAL INSTRUCTIONS
keep surgical site clean and dry, may remove dressing in 3 days . Call surgeon if any wound drainage, redness , increasing pain, fevers over 101 or if you have any questions or concerns.

## 2019-01-04 VITALS
RESPIRATION RATE: 18 BRPM | TEMPERATURE: 99 F | DIASTOLIC BLOOD PRESSURE: 62 MMHG | HEART RATE: 75 BPM | SYSTOLIC BLOOD PRESSURE: 134 MMHG

## 2019-01-04 LAB
HCT VFR BLD CALC: 29.9 % — LOW (ref 37–47)
HGB BLD-MCNC: 9.4 G/DL — LOW (ref 12–16)
MCHC RBC-ENTMCNC: 22.9 PG — LOW (ref 27–31)
MCHC RBC-ENTMCNC: 31.4 G/DL — LOW (ref 32–37)
MCV RBC AUTO: 72.7 FL — LOW (ref 81–99)
NRBC # BLD: 0 /100 WBCS — SIGNIFICANT CHANGE UP (ref 0–0)
PLATELET # BLD AUTO: 243 K/UL — SIGNIFICANT CHANGE UP (ref 130–400)
RBC # BLD: 4.11 M/UL — LOW (ref 4.2–5.4)
RBC # FLD: 14.4 % — SIGNIFICANT CHANGE UP (ref 11.5–14.5)
WBC # BLD: 7.23 K/UL — SIGNIFICANT CHANGE UP (ref 4.8–10.8)
WBC # FLD AUTO: 7.23 K/UL — SIGNIFICANT CHANGE UP (ref 4.8–10.8)

## 2019-01-04 RX ORDER — GABAPENTIN 400 MG/1
1 CAPSULE ORAL
Qty: 21 | Refills: 0 | OUTPATIENT
Start: 2019-01-04 | End: 2019-01-10

## 2019-01-04 RX ORDER — ASPIRIN/CALCIUM CARB/MAGNESIUM 324 MG
1 TABLET ORAL
Qty: 60 | Refills: 0
Start: 2019-01-04 | End: 2019-02-02

## 2019-01-04 RX ORDER — OXYCODONE HYDROCHLORIDE 5 MG/1
1 TABLET ORAL
Qty: 42 | Refills: 0
Start: 2019-01-04 | End: 2019-01-10

## 2019-01-04 RX ADMIN — Medication 1 TABLET(S): at 11:33

## 2019-01-04 RX ADMIN — Medication 100 MILLIGRAM(S): at 05:31

## 2019-01-04 RX ADMIN — Medication 25 MILLIGRAM(S): at 05:31

## 2019-01-04 RX ADMIN — Medication 1: at 08:11

## 2019-01-04 RX ADMIN — Medication 81 MILLIGRAM(S): at 05:31

## 2019-01-04 RX ADMIN — POLYETHYLENE GLYCOL 3350 17 GRAM(S): 17 POWDER, FOR SOLUTION ORAL at 11:33

## 2019-01-04 RX ADMIN — PANTOPRAZOLE SODIUM 40 MILLIGRAM(S): 20 TABLET, DELAYED RELEASE ORAL at 05:31

## 2019-01-04 RX ADMIN — Medication 650 MILLIGRAM(S): at 11:33

## 2019-01-04 RX ADMIN — GABAPENTIN 100 MILLIGRAM(S): 400 CAPSULE ORAL at 05:32

## 2019-01-04 RX ADMIN — Medication 650 MILLIGRAM(S): at 05:30

## 2019-01-04 RX ADMIN — Medication 650 MILLIGRAM(S): at 06:56

## 2019-01-04 NOTE — PROGRESS NOTE ADULT - SUBJECTIVE AND OBJECTIVE BOX
ORTHO PROGRESS NOTE       68yFemale POD #  2    S/P shandra/ right ABIMBOLA    Patient seen and examined at bedside . The patient is awake and alert in NAD. No complaints of chest pain, SOB, N/V.    Vital Signs Last 24 Hrs  T(C): 37.1 (04 Jan 2019 00:00), Max: 38.2 (03 Jan 2019 16:00)  T(F): 98.7 (04 Jan 2019 00:00), Max: 100.7 (03 Jan 2019 16:00)  HR: 83 (04 Jan 2019 00:00) (71 - 83)  BP: 127/59 (04 Jan 2019 00:00) (123/55 - 129/71)  BP(mean): 79 (03 Jan 2019 16:00) (79 - 79)  RR: 18 (04 Jan 2019 00:00) (18 - 18)  SpO2: --                          9.4    7.23  )-----------( 243      ( 04 Jan 2019 06:04 )             29.9     01-03    136  |  94<L>  |  10  ----------------------------<  136<H>  4.1   |  26  |  0.6<L>    Ca    9.1      03 Jan 2019 05:40            DVT ppx : aspirin    MEDICATIONS  (STANDING):  acetaminophen   Tablet .. 650 milliGRAM(s) Oral every 6 hours  aspirin enteric coated 81 milliGRAM(s) Oral two times a day  dextrose 5%. 1000 milliLiter(s) (50 mL/Hr) IV Continuous <Continuous>  dextrose 50% Injectable 12.5 Gram(s) IV Push once  dextrose 50% Injectable 25 Gram(s) IV Push once  dextrose 50% Injectable 25 Gram(s) IV Push once  docusate sodium 100 milliGRAM(s) Oral three times a day  gabapentin 100 milliGRAM(s) Oral three times a day  hydrochlorothiazide 25 milliGRAM(s) Oral daily  insulin lispro (HumaLOG) corrective regimen sliding scale   SubCutaneous three times a day before meals  metoprolol succinate  milliGRAM(s) Oral daily  multivitamin 1 Tablet(s) Oral daily  pantoprazole    Tablet 40 milliGRAM(s) Oral before breakfast  polyethylene glycol 3350 17 Gram(s) Oral daily  senna 2 Tablet(s) Oral at bedtime  simvastatin 10 milliGRAM(s) Oral at bedtime    MEDICATIONS  (PRN):  aluminum hydroxide/magnesium hydroxide/simethicone Suspension 30 milliLiter(s) Oral four times a day PRN Indigestion  bisacodyl Suppository 10 milliGRAM(s) Rectal daily PRN If no bowel movement by POD#2  dextrose 40% Gel 15 Gram(s) Oral once PRN Blood Glucose LESS THAN 70 milliGRAM(s)/deciliter  glucagon  Injectable 1 milliGRAM(s) IntraMuscular once PRN Glucose LESS THAN 70 milligrams/deciliter  HYDROmorphone  Injectable 0.5 milliGRAM(s) IV Push every 4 hours PRN Severe Pain (7 - 10)  magnesium hydroxide Suspension 30 milliLiter(s) Oral daily PRN Constipation  ondansetron Injectable 4 milliGRAM(s) IV Push every 6 hours PRN Nausea and/or Vomiting  oxyCODONE    IR 5 milliGRAM(s) Oral every 4 hours PRN Mild Pain (1 - 3)  oxyCODONE    IR 10 milliGRAM(s) Oral every 4 hours PRN Moderate Pain (4 - 6)  senna 2 Tablet(s) Oral at bedtime PRN Constipation      PE:   right hip dressing C/D/I          Compartments soft         NVI, SILT           A/P: 68yFemale POD # 2    S/P  shandra/ right ABIMBOLA  , doing well.            OOB to Chair            Physical Therapy           Pain control            Incentive Spirometry            DVT Prophylaxis            Discharge planning for home

## 2019-01-08 DIAGNOSIS — M16.11 UNILATERAL PRIMARY OSTEOARTHRITIS, RIGHT HIP: ICD-10-CM
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
## 2019-01-08 DIAGNOSIS — Z47.2 ENCOUNTER FOR REMOVAL OF INTERNAL FIXATION DEVICE: ICD-10-CM

## 2019-01-08 DIAGNOSIS — M87.9 OSTEONECROSIS, UNSPECIFIED: ICD-10-CM

## 2019-01-08 DIAGNOSIS — T84.84XA PAIN DUE TO INTERNAL ORTHOPEDIC PROSTHETIC DEVICES, IMPLANTS AND GRAFTS, INITIAL ENCOUNTER: ICD-10-CM

## 2019-02-24 NOTE — H&P PST ADULT - CONSTITUTIONAL
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 Discussed risks, benefits, and side effects.\par -Discussed the need for radiation therapy with Dr. Lomeli after chemotherapy due to positive margins of mastectomy\par -follow up based on treatment schedule\par 
Well-developed, well nourished

## 2019-08-21 NOTE — ASU PATIENT PROFILE, ADULT - PMH
DM (diabetes mellitus)    HTN (hypertension)    Hypercholesteremia    OA (osteoarthritis) Patient/Caregiver provided printed discharge information.

## 2021-03-10 ENCOUNTER — TRANSCRIPTION ENCOUNTER (OUTPATIENT)
Age: 71
End: 2021-03-10

## 2021-11-14 ENCOUNTER — TRANSCRIPTION ENCOUNTER (OUTPATIENT)
Age: 71
End: 2021-11-14

## 2021-12-21 ENCOUNTER — TRANSCRIPTION ENCOUNTER (OUTPATIENT)
Age: 71
End: 2021-12-21

## 2024-05-27 ENCOUNTER — INPATIENT (INPATIENT)
Facility: HOSPITAL | Age: 74
LOS: 6 days | Discharge: HOME | DRG: 535 | End: 2024-06-03
Attending: SURGERY | Admitting: SURGERY
Payer: MEDICARE

## 2024-05-27 VITALS
TEMPERATURE: 98 F | SYSTOLIC BLOOD PRESSURE: 155 MMHG | RESPIRATION RATE: 18 BRPM | DIASTOLIC BLOOD PRESSURE: 78 MMHG | WEIGHT: 184.97 LBS | HEART RATE: 81 BPM | OXYGEN SATURATION: 98 %

## 2024-05-27 DIAGNOSIS — Z98.890 OTHER SPECIFIED POSTPROCEDURAL STATES: Chronic | ICD-10-CM

## 2024-05-27 DIAGNOSIS — E11.9 TYPE 2 DIABETES MELLITUS WITHOUT COMPLICATIONS: ICD-10-CM

## 2024-05-27 DIAGNOSIS — E04.1 NONTOXIC SINGLE THYROID NODULE: ICD-10-CM
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
## 2024-05-27 DIAGNOSIS — S32.591A OTHER SPECIFIED FRACTURE OF RIGHT PUBIS, INITIAL ENCOUNTER FOR CLOSED FRACTURE: ICD-10-CM

## 2024-05-27 DIAGNOSIS — D64.9 ANEMIA, UNSPECIFIED: ICD-10-CM

## 2024-05-27 DIAGNOSIS — S32.391A OTHER FRACTURE OF RIGHT ILIUM, INITIAL ENCOUNTER FOR CLOSED FRACTURE: ICD-10-CM

## 2024-05-27 DIAGNOSIS — E83.42 HYPOMAGNESEMIA: ICD-10-CM

## 2024-05-27 DIAGNOSIS — M97.01XA PERIPROSTHETIC FRACTURE AROUND INTERNAL PROSTHETIC RIGHT HIP JOINT, INITIAL ENCOUNTER: ICD-10-CM

## 2024-05-27 DIAGNOSIS — Z79.82 LONG TERM (CURRENT) USE OF ASPIRIN: ICD-10-CM

## 2024-05-27 DIAGNOSIS — N30.00 ACUTE CYSTITIS WITHOUT HEMATURIA: ICD-10-CM

## 2024-05-27 DIAGNOSIS — I10 ESSENTIAL (PRIMARY) HYPERTENSION: ICD-10-CM

## 2024-05-27 DIAGNOSIS — E78.00 PURE HYPERCHOLESTEROLEMIA, UNSPECIFIED: ICD-10-CM

## 2024-05-27 DIAGNOSIS — S32.471A: ICD-10-CM

## 2024-05-27 DIAGNOSIS — N17.9 ACUTE KIDNEY FAILURE, UNSPECIFIED: ICD-10-CM

## 2024-05-27 DIAGNOSIS — Z79.84 LONG TERM (CURRENT) USE OF ORAL HYPOGLYCEMIC DRUGS: ICD-10-CM
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
## 2024-05-27 DIAGNOSIS — M19.90 UNSPECIFIED OSTEOARTHRITIS, UNSPECIFIED SITE: ICD-10-CM

## 2024-05-27 DIAGNOSIS — Z96.641 PRESENCE OF RIGHT ARTIFICIAL HIP JOINT: ICD-10-CM

## 2024-05-27 DIAGNOSIS — R32 UNSPECIFIED URINARY INCONTINENCE: ICD-10-CM

## 2024-05-27 LAB
ALBUMIN SERPL ELPH-MCNC: 4.1 G/DL — SIGNIFICANT CHANGE UP (ref 3.5–5.2)
ALP SERPL-CCNC: 121 U/L — HIGH (ref 30–115)
ALT FLD-CCNC: 15 U/L — SIGNIFICANT CHANGE UP (ref 0–41)
ANION GAP SERPL CALC-SCNC: 16 MMOL/L — HIGH (ref 7–14)
APPEARANCE UR: ABNORMAL
APTT BLD: 33.5 SEC — SIGNIFICANT CHANGE UP (ref 27–39.2)
AST SERPL-CCNC: 18 U/L — SIGNIFICANT CHANGE UP (ref 0–41)
BACTERIA # UR AUTO: ABNORMAL /HPF
BASOPHILS # BLD AUTO: 0.05 K/UL — SIGNIFICANT CHANGE UP (ref 0–0.2)
BASOPHILS NFR BLD AUTO: 0.7 % — SIGNIFICANT CHANGE UP (ref 0–1)
BILIRUB SERPL-MCNC: 0.3 MG/DL — SIGNIFICANT CHANGE UP (ref 0.2–1.2)
BILIRUB UR-MCNC: NEGATIVE — SIGNIFICANT CHANGE UP
BLD GP AB SCN SERPL QL: SIGNIFICANT CHANGE UP
BUN SERPL-MCNC: 25 MG/DL — HIGH (ref 10–20)
CALCIUM SERPL-MCNC: 8.9 MG/DL — SIGNIFICANT CHANGE UP (ref 8.4–10.5)
CAST: 25 /LPF — HIGH (ref 0–4)
CHLORIDE SERPL-SCNC: 95 MMOL/L — LOW (ref 98–110)
CO2 SERPL-SCNC: 23 MMOL/L — SIGNIFICANT CHANGE UP (ref 17–32)
COLOR SPEC: SIGNIFICANT CHANGE UP
CREAT SERPL-MCNC: 0.8 MG/DL — SIGNIFICANT CHANGE UP (ref 0.7–1.5)
DIFF PNL FLD: ABNORMAL
EGFR: 78 ML/MIN/1.73M2 — SIGNIFICANT CHANGE UP
EOSINOPHIL # BLD AUTO: 0.08 K/UL — SIGNIFICANT CHANGE UP (ref 0–0.7)
EOSINOPHIL NFR BLD AUTO: 1.2 % — SIGNIFICANT CHANGE UP (ref 0–8)
GLUCOSE SERPL-MCNC: 243 MG/DL — HIGH (ref 70–99)
GLUCOSE UR QL: 250 MG/DL
HCT VFR BLD CALC: 38.6 % — SIGNIFICANT CHANGE UP (ref 37–47)
HGB BLD-MCNC: 12.4 G/DL — SIGNIFICANT CHANGE UP (ref 12–16)
IMM GRANULOCYTES NFR BLD AUTO: 0.6 % — HIGH (ref 0.1–0.3)
INR BLD: 1.12 RATIO — SIGNIFICANT CHANGE UP (ref 0.65–1.3)
KETONES UR-MCNC: ABNORMAL MG/DL
LEUKOCYTE ESTERASE UR-ACNC: ABNORMAL
LYMPHOCYTES # BLD AUTO: 0.93 K/UL — LOW (ref 1.2–3.4)
LYMPHOCYTES # BLD AUTO: 13.7 % — LOW (ref 20.5–51.1)
MCHC RBC-ENTMCNC: 23.6 PG — LOW (ref 27–31)
MCHC RBC-ENTMCNC: 32.1 G/DL — SIGNIFICANT CHANGE UP (ref 32–37)
MCV RBC AUTO: 73.4 FL — LOW (ref 81–99)
MONOCYTES # BLD AUTO: 0.53 K/UL — SIGNIFICANT CHANGE UP (ref 0.1–0.6)
MONOCYTES NFR BLD AUTO: 7.8 % — SIGNIFICANT CHANGE UP (ref 1.7–9.3)
NEUTROPHILS # BLD AUTO: 5.18 K/UL — SIGNIFICANT CHANGE UP (ref 1.4–6.5)
NEUTROPHILS NFR BLD AUTO: 76 % — HIGH (ref 42.2–75.2)
NITRITE UR-MCNC: POSITIVE
NRBC # BLD: 0 /100 WBCS — SIGNIFICANT CHANGE UP (ref 0–0)
PH UR: 5.5 — SIGNIFICANT CHANGE UP (ref 5–8)
PLATELET # BLD AUTO: 252 K/UL — SIGNIFICANT CHANGE UP (ref 130–400)
PMV BLD: 9.5 FL — SIGNIFICANT CHANGE UP (ref 7.4–10.4)
POTASSIUM SERPL-MCNC: 4.3 MMOL/L — SIGNIFICANT CHANGE UP (ref 3.5–5)
POTASSIUM SERPL-SCNC: 4.3 MMOL/L — SIGNIFICANT CHANGE UP (ref 3.5–5)
PROT SERPL-MCNC: 7 G/DL — SIGNIFICANT CHANGE UP (ref 6–8)
PROT UR-MCNC: >=1000 MG/DL
PROTHROM AB SERPL-ACNC: 12.8 SEC — SIGNIFICANT CHANGE UP (ref 9.95–12.87)
RBC # BLD: 5.26 M/UL — SIGNIFICANT CHANGE UP (ref 4.2–5.4)
RBC # FLD: 15.9 % — HIGH (ref 11.5–14.5)
RBC CASTS # UR COMP ASSIST: 117 /HPF — HIGH (ref 0–4)
SODIUM SERPL-SCNC: 134 MMOL/L — LOW (ref 135–146)
SP GR SPEC: 1.02 — SIGNIFICANT CHANGE UP (ref 1–1.03)
SQUAMOUS # UR AUTO: 14 /HPF — HIGH (ref 0–5)
UROBILINOGEN FLD QL: 0.2 MG/DL — SIGNIFICANT CHANGE UP (ref 0.2–1)
WBC # BLD: 6.81 K/UL — SIGNIFICANT CHANGE UP (ref 4.8–10.8)
WBC # FLD AUTO: 6.81 K/UL — SIGNIFICANT CHANGE UP (ref 4.8–10.8)
WBC UR QL: >998 /HPF — HIGH (ref 0–5)

## 2024-05-27 PROCEDURE — 73502 X-RAY EXAM HIP UNI 2-3 VIEWS: CPT | Mod: 26,RT

## 2024-05-27 PROCEDURE — 72192 CT PELVIS W/O DYE: CPT | Mod: 26,MC

## 2024-05-27 PROCEDURE — 99285 EMERGENCY DEPT VISIT HI MDM: CPT

## 2024-05-27 PROCEDURE — 72170 X-RAY EXAM OF PELVIS: CPT | Mod: 26,XE

## 2024-05-27 RX ORDER — CEFTRIAXONE 500 MG/1
1000 INJECTION, POWDER, FOR SOLUTION INTRAMUSCULAR; INTRAVENOUS ONCE
Refills: 0 | Status: COMPLETED | OUTPATIENT
Start: 2024-05-27 | End: 2024-05-27

## 2024-05-27 NOTE — ED PROVIDER NOTE - NSICDXFAMILYHX_GEN_ALL_CORE_FT
FAMILY HISTORY:  Father  Still living? Unknown  HTN (hypertension), Age at diagnosis: Age Unknown    Mother  Still living? No  HTN (hypertension), Age at diagnosis: Age Unknown

## 2024-05-27 NOTE — ED ADULT TRIAGE NOTE - CHIEF COMPLAINT QUOTE
biba from home, fell, denies pain, c/o difficulty ambulating, denies hitting head
Alert and oriented to person, place and time. Normal mood and affect, no apparent risk to self or others

## 2024-05-27 NOTE — CONSULT NOTE ADULT - SUBJECTIVE AND OBJECTIVE BOX
ORTHOPEDIC CONSULT NOTE    74F hx of R ABIMBOLA (1/2019), HTN, T2DM transfer from [facility] with right groin pain and inability to ambulate s/p mechanical fall from standing height. Denies HT/LOC. Not on AC. Ambulates without assistive devices prior to fall. Denies any numbness/tingling to RLE.     Exam:   Skin intact  No gross deformity  Mild TTp over groin  ROM hip deferred  NVIT     CT Pelvis: Right Anterior column/posterior wall Periprosthetic Acetabular fx, right inferior rami fx (likely chronic)    74F hx of R ABIMBOLA (1/2019), HTN, T2DM presents with Right AC/PW PP Acetabular fx, likely old right inferior rami fx s/p mechanical fall -- found to have air in the posterior wall bladder. Patient seen and examined with Dr. Beard at bedside. Discussed nature of osseous injury -- will trial nonoperative management with period of ambulation with assistive devices (Walker) and repeat XR in 2weeks (outpatient vs. inpatient if remains admitted)    - No acute orthopedic intervention at this time  - TTWB RLE with walker   - Pain control  - PT/OT eval  - Home meds  - SCDs bilaterally + chemical ppx per primary; can dc on 6 weeks course of ASA 81 BID    Upon discharge, please have patient follow up in 2 weeks with Dr. Beard at [address] [place] [address]      Seen and evaluated at Bedside with Dr. Beard

## 2024-05-27 NOTE — ED ADULT NURSE NOTE - PRIMARY CARE PROVIDER
pcp Odomzo Counseling- I discussed with the patient the risks of Odomzo including but not limited to nausea, vomiting, diarrhea, constipation, weight loss, changes in the sense of taste, decreased appetite, muscle spasms, and hair loss.  The patient verbalized understanding of the proper use and possible adverse effects of Odomzo.  All of the patient's questions and concerns were addressed.

## 2024-05-27 NOTE — CONSULT NOTE ADULT - ATTENDING COMMENTS
Trauma Attending H&P Attestation    Patient seen and evaluated with the trauma team in the trauma bay upon arrival. All pertinent labs and radiographic imaging reviewed, pending final reports. Outpatient medications reviewed, including the presence of anticoagulants, if applicable. I agree with the resident's note above, including the physical exam findings, assessment and plan as documented with the following adjustments.     Trauma Level: [ ] Code  [x ] Alert  [ ] Consult [ x] Transfer in from [facility]   Patient is seen at the bedside at 21:35  Activation by:  [x ] ED physician [x ] EMS  Intubated in Field? [ ] Yes [x ] No  Intubated in ED? [ ] Yes [ x] No  Intubated in Trauma [place]? [ ] Yes [x ] No  ALLEN SCHULZ Patient is a 74y old  Female who presents with a chief complaint of ground level fall and sustained  PELVIC FRACTURES. patient was found to have air in the urinary bladder due emphysematous cystitis vs urinary bladder injury/rapture       Patient presented with GCS [15 ]  upon arrival to the trauma bay.  Allergies  No Known Allergies  Intolerances    PAST MEDICAL & SURGICAL HISTORY:  DM (diabetes mellitus)  Hypercholesteremia  HTN (hypertension)  OA (osteoarthritis)  History of joint surgery Right HIP ORIF  [facility]  History of tonsillectomy and adenoidectomy 1957    On AC/Antiplatelets [ ] Yes [ x] No              [ ] NOVACs, [ ] Coumadin, [ ] ASA, [ ] Antiplatelets       PE: pain on passive motion at the right hip   Assessment: Fall from standing                    pelvic fracture                    periprosthetic fracture hip                    cystitis vs bladder injury   PLAN  - supportive care  - GI/DVT prophylaxis  - pain management  - repeat studies as needed  - complete and follow up on trauma work up included but not limites to                          [x ] CXR [ x] PXR [ x] Extremities X-RAYs                          [x ] NCHCT [x ] C-Spine CT [x ] CT Chest [ x] CT Abdomen/Pelvis  [x ] FAST [ ] Other                          [x] CT cystogram                          [x ] Trauma Labs   [ ] Toxicology                    I independently read and reviewed the above studies -> multiple right sided pelvic fractures, periprosthetic femur fracture, emphysematous cystitis  - Follow up Consults  [ ] Neurosurgery [x ] Orthopaedics [ ] Plastics [ ] Fascial/OMFS [ ] Opthalmology   [x ] Urology  [ ] ENT  [ ] Pediatric ICU                                  [ ] SICU/SDU [ ] Burn/Burn ICU  [ ] Medicine [ ] Geriatrics [ ] Cardiology/EP [ ] Hospice/Palliative Care                                      - IV ABx give as indicated [x ] Yes [ ] No  - Tetanus given as indicated [ ] Yes [x ] No  - Seizures prophylaxis  [ ] Yes,  [ x] No    Nayeli Michael MD, FACS  Trauma/ACS/Surgical Critical Care Attending

## 2024-05-27 NOTE — CONSULT NOTE ADULT - SUBJECTIVE AND OBJECTIVE BOX
UROLOGY:     Patient is a 73 yo F with PMH of HTN, DM, Hypercholesteremia, Osteoarthritis s/p R hip replacement by Ortho (1/2019) Transferred from [facility] from Trauma evaluation s/p fall -  c/s for CT Pelvis finding of Extensive air within the urinary bladder suspected Posterior urinary bladder rupture. Patient seen and examined at bedside. Patient reports she was going down stairs and missed a 1 step and fell backwards to R side. Patient reports right sided hip pain and difficult bearing weight. Patient reports she had voided since her incident w/o any pain or hematuria. Patient reports she wears diapers at night for incontinence. Denies any urological symptoms at this time. Denies any fever, chills, SOB/difficulty breathing, abdominal pain, flank pain, dysuria or hematuria at this time.     PAST MEDICAL & SURGICAL HISTORY:  DM (diabetes mellitus)      Hypercholesteremia      HTN (hypertension)      OA (osteoarthritis)      History of joint surgery  Right HIP ORIF  [facility]      History of tonsillectomy and adenoidectomy  1957          MEDICATIONS  (STANDING):    MEDICATIONS  (PRN):      Allergies    No Known Allergies    Intolerances        SOCIAL HISTORY: No illicit drug use    FAMILY HISTORY:  HTN (hypertension) (Father, Mother)        REVIEW OF SYSTEMS:  [ ] A ten-point review of systems was otherwise negative except as noted.     Vital Signs Last 24 Hrs  T(C): 37 (27 May 2024 21:01), Max: 37 (27 May 2024 21:01)  T(F): 98.6 (27 May 2024 21:01), Max: 98.6 (27 May 2024 21:01)  HR: 96 (27 May 2024 21:10) (81 - 96)  BP: 169/80 (27 May 2024 21:01) (155/78 - 169/80)  RR: 18 (27 May 2024 21:01) (18 - 18)  SpO2: 92% (27 May 2024 21:01) (92% - 98%)    Parameters below as of 27 May 2024 21:01  Patient On (Oxygen Delivery Method): room air        PHYSICAL EXAM:    GEN: NAD, well-developed, awake and alert.  SKIN: Good color, non diaphoretic.  HEENT: NC/AT.  RESP: No dyspnea, non-labored breathing. No use of accessory muscles.  CARDIO: +S1/S2  ABDO: Soft, NT/ND, no palpable bladder,   BACK: No CVAT B/L,   : + Patient voids freely, no suprapubic tenderness, +diaper in place   MSK: Right sided hip tenderness to palpation       I&O's Summary      LABS:                        12.4   6.81  )-----------( 252      ( 27 May 2024 20:00 )             38.6     05-27    134<L>  |  95<L>  |  25<H>  ----------------------------<  243<H>  4.3   |  23  |  0.8    Ca    8.9      27 May 2024 20:00    TPro  7.0  /  Alb  4.1  /  TBili  0.3  /  DBili  x   /  AST  18  /  ALT  15  /  AlkPhos  121<H>  05-27    PT/INR - ( 27 May 2024 20:00 )   PT: 12.80 sec;   INR: 1.12 ratio         PTT - ( 27 May 2024 20:00 )  PTT:33.5 sec  Urinalysis Basic - ( 27 May 2024 20:00 )    Color: x / Appearance: x / SG: x / pH: x  Gluc: 243 mg/dL / Ketone: x  / Bili: x / Urobili: x   Blood: x / Protein: x / Nitrite: x   Leuk Esterase: x / RBC: x / WBC x   Sq Epi: x / Non Sq Epi: x / Bacteria: x            RADIOLOGY & ADDITIONAL STUDIES:  < from: CT Pelvis No Cont (05.27.24 @ 19:04) >  ACC: [id number] EXAM:  CT PELVIS ONLY   ORDERED BY: ELYSE KLERMAN     PROCEDURE DATE:  05/27/2024          INTERPRETATION:  Clinical History / Reason for exam: Trauma    CAT scan of the bony pelvis was performed using axial images. Sagittal   and coronal reformatted images were obtained.    FINDINGS:    Bones are osteopenic. There is a comminuted fracture of the right pubic   rami. Comminuted fractures also seen involving the medial acetabular   wall. Comminuted fractures are also noted of the inferior right iliac   bone just superior to the right acetabular prosthesis.    There is a periprosthetic fracture laterally of the right femoral shaft   at the mid aspect of the prosthesis.    Left hemipelvis appears intact. There are degenerative changes.    Arthritic changes are seen of the sacroiliac joints. Generative changes   are seen within the visualized lower lumbar spine.    There is a large calcified myomatous uterus. There is extensive air   within the urinary bladder. There is irregularity of the posterior aspect   of the urinary bladder suspicious for urinary bladder rupture    IMPRESSION:    Extensive right-sided pelvic fractures as described.    Right periprosthetic fracture of the femoral shaft.    Extensive air within the urinary bladder. Posterior urinary bladder   rupture is suspected. Next    Findings were discussed with Dr. Klerman during time interpretation on   May 27, 2024 at 7:41 PM with read back    --- End of Report ---      MARY ALMONTE MD; Attending Radiologist  This document has been electronically signed. May 27 2024  7:42PM    < end of copied text >

## 2024-05-27 NOTE — ED ADULT NURSE REASSESSMENT NOTE - NSFALLRISKINTERV_ED_ALL_ED
Assistance OOB with selected safe patient handling equipment if applicable/Assistance with ambulation/Communicate fall risk and risk factors to all staff, patient, and family/Monitor gait and stability/Provide visual cue: yellow wristband, yellow gown, etc/Reinforce activity limits and safety measures with patient and family/Call bell, personal items and telephone in reach/Instruct patient to call for assistance before getting out of bed/chair/stretcher/Non-slip footwear applied when patient is off stretcher/[place] to call system/Physically safe environment - no spills, clutter or unnecessary equipment/Purposeful Proactive Rounding/Room/bathroom lighting operational, light cord in reach

## 2024-05-27 NOTE — ED PROVIDER NOTE - OBJECTIVE STATEMENT
75 y/o female with hx of NIDDM, HTN, right hip replacement presents to the ED s/p fall c/o right hip pain and difficulty weight bearing. patient denies any head injury, neck or back pain . no abdominal pain.

## 2024-05-27 NOTE — ED PROVIDER NOTE - PHYSICAL EXAMINATION
generalized: comfortable, alert , normocephalic  eyes: PERRLA, EOMI  ENT:  no nasal bone tenderness, no dental injury, no gum swelling  resp: CTA, no bony step off , no chest wall tenderness, no bruising to chest wall  cardiac: RRR S1S2  abd: non tender RUQ or LUQ, non distended, no bruising   msk: neck supple, no cervical tenderness, pelvis stable with right sided tenderness, no vertebral tenderness- flexion and extension in tact, upper extremities - good rom no tenderness, lower extremities- right hip decreased flexion secondary to pain , no tenderness  skin: no lacerations, bruising or swelling   neuro: alert and oriented, follows commands, no sensory or motor deficits

## 2024-05-27 NOTE — CONSULT NOTE ADULT - ASSESSMENT
Patient is a 73 yo F with PMH of HTN, DM, Hypercholesteremia, Osteoarthritis s/p R hip replacement by Ortho (1/2019) Transferred from [facility] from Trauma evaluation s/p fall -  c/s for CT Pelvis finding of Extensive air within the urinary bladder suspected Posterior urinary bladder rupture.    Plan:   Case discussed with Dr. Wong, plan as follows:  - Obtain CT cystogram with contrast to r/o bladder rupture   - Place padilla catheter for procedure   - Obtain UA & UCx   - I&Os   - F/u Trama recs and Ortho recs for multiple pelvic fractures, hx of hip replacement in 2019   - spoke with ED team  - will follow  Patient is a 73 yo F with PMH of HTN, DM, Hypercholesteremia, Osteoarthritis s/p R hip replacement by Ortho (1/2019) Transferred from [facility] from Trauma evaluation s/p fall -  c/s for CT Pelvis finding of Extensive air within the urinary bladder suspected Posterior urinary bladder rupture.    Plan:   CT reviewed and Case discussed with Dr. Wong, plan as follows:  - Obtain CT cystogram with contrast to r/o bladder rupture   - Place padilla catheter for procedure   - Obtain UA & UCx   - I&Os   - F/u Trauma recs and Ortho recs for multiple pelvic fractures, hx of hip replacement in 2019   - spoke with ED team  - will follow  Patient is a 75 yo F with PMH of HTN, DM, Hypercholesteremia, Osteoarthritis s/p R hip replacement by Ortho (1/2019) Transferred from [facility] from Trauma evaluation s/p fall -  c/s for CT Pelvis finding of Extensive air within the urinary bladder suspected Posterior urinary bladder rupture.    Plan:   CT reviewed and Case discussed with Dr. Wong, plan as follows:  - Obtain CT cystogram with contrast to r/o bladder rupture   - Place padilla catheter for procedure   - Obtain UA & UCx   - I&Os   - F/u Trauma recs and Ortho recs for multiple pelvic fractures, hx of hip replacement in 2019   - spoke with ED team    - Addendum:  - Case and imaging discussed with Dr. Wong, plan as per attending   - CT Cystogram shows no evidence of bladder rupture. More consistent with emphysematous cystitis. Continue Padilla catheter to decompress bladder and continue IV Abx as per ID.   - Recall prn .

## 2024-05-27 NOTE — CONSULT NOTE ADULT - SUBJECTIVE AND OBJECTIVE BOX
TRAUMA ACTIVATION LEVEL:  CODE / ALERT  / CONSULT  ACTIVATED BY: EMS**  /  ED**  INTUBATED: YES** / NO**      MECHANISM OF INJURY:   [] Blunt     [] MVC	  [] Fall	  [] Pedestrian Struck	  [] Motorcycle     [] Assault     [] Bicycle collision    [] Sports injury    [] Penetrating    [] Gun Shot Wound      [] Stab Wound    GCS: 15 	E: 4	V: 5	M: 6    HPI:    74yF w/ PMHx of *** seen as (Code Trauma / Trauma Alert / Trauma Consult) s/p ******.  Trauma assessment in ED: ABCs intact , GCS 15 , AAOx3.    PAST MEDICAL & SURGICAL HISTORY:  DM (diabetes mellitus)  Hypercholesteremia  HTN (hypertension)  OA (osteoarthritis)  History of joint surgery  Right HIP ORIF  [facility]  History of tonsillectomy and adenoidectomy        Allergies  No Known Allergies  Intolerances    Home Medications:  hydroCHLOROthiazide 25 mg oral tablet: 1 tab(s) orally once a day (2019 08:49)  metFORMIN 1000 mg oral tablet: 1 tab(s) orally 2 times a day (2019 08:49)  metoprolol succinate 100 mg oral tablet, extended release: 1 tab(s) orally once a day (2019 08:49)  Zocor with Ezatimibe 10/10m tab(s) orally once a day (at bedtime) (2019 08:49)      ROS: 10-system review is otherwise negative except HPI above.      Primary Survey:    A - airway intact  B - bilateral breath sounds and good chest rise  C - palpable pulses in all extremities  D - GCS 15 on arrival, FLORES  Exposure obtained    Vital Signs Last 24 Hrs  T(C): 37 (27 May 2024 21:01), Max: 37 (27 May 2024 21:01)  T(F): 98.6 (27 May 2024 21:01), Max: 98.6 (27 May 2024 21:01)  HR: 96 (27 May 2024 21:10) (81 - 96)  BP: 169/80 (27 May 2024 21:01) (155/78 - 169/80)  BP(mean): --  RR: 18 (27 May 2024 21:01) (18 - 18)  SpO2: 92% (27 May 2024 21:01) (92% - 98%)    Parameters below as of 27 May 2024 21:01  Patient On (Oxygen Delivery Method): room air      Secondary Survey:   General: NAD  HEENT: Normocephalic, atraumatic, EOMI, PEERLA. no scalp lacerations   Neck: Soft, midline trachea. no c-spine tenderness  Chest: No chest wall tenderness, no subcutaneous emphysema   Cardiac: S1, S2, RRR  Respiratory: Bilateral breath sounds, clear and equal bilaterally  Abdomen: Soft, non-distended, non-tender, no rebound, no guarding.  Groin: Normal appearing, pelvis stable   Ext:  Moving b/l upper and lower extremities. Palpable Radial b/l UE, b/l DP palpable in LE.   Back: No T/L/S spine tenderness, No palpable runoff/stepoff/deformity  Rectal: No mady blood, CARMEN with good tone    FAST: *****/Negative    ACCESS / DEVICES:  [X ] Peripheral IV			  [ X] Urinary Catheter,  Date Placed:       Labs:  CAPILLARY BLOOD GLUCOSE      POCT Blood Glucose.: 226 mg/dL (27 May 2024 21:08)                          12.4   6.81  )-----------( 252      ( 27 May 2024 20:00 )             38.6       Auto Neutrophil %: 76.0 % (24 @ 20:00)  Auto Immature Granulocyte %: 0.6 % (24 @ 20:00)        134<L>  |  95<L>  |  25<H>  ----------------------------<  243<H>  4.3   |  23  |  0.8      Calcium: 8.9 mg/dL (24 @ 20:00)      LFTs:             7.0  | 0.3  | 18       ------------------[121     ( 27 May 2024 20:00 )  4.1  | x    | 15          Lipase:x      Amylase:x             Coags:     12.80  ----< 1.12    ( 27 May 2024 20:00 )     33.5                Urinalysis Basic - ( 27 May 2024 20:00 )    Color: x / Appearance: x / SG: x / pH: x  Gluc: 243 mg/dL / Ketone: x  / Bili: x / Urobili: x   Blood: x / Protein: x / Nitrite: x   Leuk Esterase: x / RBC: x / WBC x   Sq Epi: x / Non Sq Epi: x / Bacteria: x    RADIOLOGY & ADDITIONAL STUDIES:  < from: Xray Pelvis AP only (24 @ 16:42) >    Right inferior pubic ramus fracture.    Post right hip arthroplasty. No evidence for hardware malfunction.    < end of copied text >  < from: Xray Hip 2-3 Views, Right (24 @ 16:42) >  Post right hip arthroplasty. No evidence for acute fracture or hardware   malfunction.      < end of copied text >  < from: CT Pelvis No Cont (24 @ 19:04) >  Extensive right-sided pelvic fractures as described.    Right periprosthetic fracture of the femoral shaft.    Extensive air within the urinary bladder. Posterior urinary bladder   rupture is suspected. Next    < end of copied text >    ---------------------------------------------------------------------------------------     TRAUMA ACTIVATION LEVEL: ALERT  ACTIVATED BY: ED  INTUBATED: NO      MECHANISM OF INJURY:   [] Blunt     [] MVC	  [XX] Fall	  [] Pedestrian Struck	    GCS: 15 	E: 4	V: 5	M: 6    HPI:    74yF w/ PMHx of HTN, HLD, DM, OA, right hip surgery seen as a trauma transfer from [facility] s/p mechanical fall today -HT, -LOC, -AC.  Patient was entering her friend's home from the garage when she tripped over 1 step falling on to her right side. She was not able to get up and walk without great amount of pain. Denies pain to anywhere else. Denies chest pain, SOB, fever or chills, abdominal pain or difficulty urinating. Trauma assessment in ED: ABCs intact , GCS 15 , AAOx3.    PAST MEDICAL & SURGICAL HISTORY:  DM (diabetes mellitus)  Hypercholesteremia  HTN (hypertension)  OA (osteoarthritis)  History of joint surgery  Right HIP ORIF  [facility]  History of tonsillectomy and adenoidectomy        Allergies  No Known Allergies  Intolerances    Home Medications:  hydroCHLOROthiazide 25 mg oral tablet: 1 tab(s) orally once a day (2019 08:49)  metFORMIN 1000 mg oral tablet: 1 tab(s) orally 2 times a day (2019 08:49)  metoprolol succinate 100 mg oral tablet, extended release: 1 tab(s) orally once a day (2019 08:49)  Zocor with Ezatimibe 10/10m tab(s) orally once a day (at bedtime) (2019 08:49)      ROS: 10-system review is otherwise negative except HPI above.      Primary Survey:    A - airway intact  B - bilateral breath sounds and good chest rise  C - palpable pulses in all extremities  D - GCS 15 on arrival, FLORES  Exposure obtained    Vital Signs Last 24 Hrs  T(C): 37 (27 May 2024 21:01), Max: 37 (27 May 2024 21:01)  T(F): 98.6 (27 May 2024 21:01), Max: 98.6 (27 May 2024 21:01)  HR: 96 (27 May 2024 21:10) (81 - 96)  BP: 169/80 (27 May 2024 21:01) (155/78 - 169/80)  BP(mean): --  RR: 18 (27 May 2024 21:01) (18 - 18)  SpO2: 92% (27 May 2024 21:01) (92% - 98%)    Parameters below as of 27 May 2024 21:01  Patient On (Oxygen Delivery Method): room air      Secondary Survey:   General: NAD  HEENT: Normocephalic, atraumatic, EOMI, PEERLA. no scalp lacerations   Neck: Soft, midline trachea. no c-spine tenderness  Chest: No chest wall tenderness, no subcutaneous emphysema   Cardiac: S1, S2, RRR  Respiratory: Bilateral breath sounds, clear and equal bilaterally  Abdomen: Soft, non-distended, non-tender  Groin: Normal appearing, pelvis stable   Ext:  shortened and externally rotated RLE with pain but able to move hip with some pain,  Palpable Radial b/l UE, b/l DP palpable in LE.   Back: No T/L/S spine tenderness, No palpable runoff/stepoff/deformity        ACCESS / DEVICES:  [X ] Peripheral IV			  [ X] Urinary Catheter,  Date Placed:       Labs:  CAPILLARY BLOOD GLUCOSE      POCT Blood Glucose.: 226 mg/dL (27 May 2024 21:08)                          12.4   6.81  )-----------( 252      ( 27 May 2024 20:00 )             38.6       Auto Neutrophil %: 76.0 % (24 @ 20:00)  Auto Immature Granulocyte %: 0.6 % (24 @ 20:00)        134<L>  |  95<L>  |  25<H>  ----------------------------<  243<H>  4.3   |  23  |  0.8      Calcium: 8.9 mg/dL (24 @ 20:00)      LFTs:             7.0  | 0.3  | 18       ------------------[121     ( 27 May 2024 20:00 )  4.1  | x    | 15          Lipase:x      Amylase:x             Coags:     12.80  ----< 1.12    ( 27 May 2024 20:00 )     33.5                Urinalysis Basic - ( 27 May 2024 20:00 )    Color: x / Appearance: x / SG: x / pH: x  Gluc: 243 mg/dL / Ketone: x  / Bili: x / Urobili: x   Blood: x / Protein: x / Nitrite: x   Leuk Esterase: x / RBC: x / WBC x   Sq Epi: x / Non Sq Epi: x / Bacteria: x    RADIOLOGY & ADDITIONAL STUDIES:  < from: Xray Pelvis AP only (24 @ 16:42) >    Right inferior pubic ramus fracture.    Post right hip arthroplasty. No evidence for hardware malfunction.    < end of copied text >  < from: Xray Hip 2-3 Views, Right (24 @ 16:42) >  Post right hip arthroplasty. No evidence for acute fracture or hardware   malfunction.      < end of copied text >  < from: CT Pelvis No Cont (24 @ 19:04) >  Extensive right-sided pelvic fractures as described.    Right periprosthetic fracture of the femoral shaft.    Extensive air within the urinary bladder. Posterior urinary bladder   rupture is suspected. Next    < end of copied text >    ---------------------------------------------------------------------------------------

## 2024-05-27 NOTE — ED PROVIDER NOTE - CARE PLAN
1 Principal Discharge DX:	Fracture, rib  Secondary Diagnosis:	Fractured pelvis  Secondary Diagnosis:	Acute UTI  Secondary Diagnosis:	Sepsis  Secondary Diagnosis:	Bladder rupture

## 2024-05-27 NOTE — CONSULT NOTE ADULT - ASSESSMENT
ASSESSMENT:  74y Female  w/ PMHx of *** seen as (Code Trauma / Trauma Alert / Trauma Consult) s/p ****** with complaint of *** , external signs of trauma include *** . Trauma assessment in ED: ABCs intact , GCS 15 , AAOx3,  FLORES.     Injuries identified:   -   -   -     PLAN:   - Trauma Labs: (CBC, BMP, Coags, T&S, UA, EtOH level)  Additional studies:  EKG  Utox    Trauma Imaging to include the following:  - CXR, Pelvic Xray  -CT Chest, CT Abd/Pelvis (cystogram)   - Extremity films: RLE per ortho    Additional consultations:  - Orthopedics  - Urology     Disposition pending results of above labs and imaging  Above plan discussed with Trauma attending, Dr. Michael , patient, patient family, and ED team  --------------------------------------------------------------------------------------  05-27-24 @ 21:53 ASSESSMENT:  4yF w/ PMHx of HTN, HLD, DM, OA, right hip surgery seen as a trauma transfer from [facility] s/p mechanical fall today -HT, -LOC, -AC.   external signs of trauma include right externally rotated hip . Trauma assessment in ED: ABCs intact , GCS 15 , AAOx3,  FLORES.     Injuries identified:   - right comminuted pubic rami fx  - right medial acetabular wall fx  - inferior right iliac fx  - periprosthetic femoral shaft fx    PLAN:   - Trauma Labs: (CBC, BMP, Coags, T&S, UA, EtOH level)  Additional studies:  EKG  Utox    Trauma Imaging to include the following:  - CXR, Pelvic Xray  -CT Chest, CT Abd/Pelvis (cystogram)   - Extremity films: RLE per ortho    Additional consultations:  - Orthopedics  - Urology     Disposition pending results of above labs and imaging  Above plan discussed with Trauma attending, Dr. Michael , patient, patient family, and ED team  --------------------------------------------------------------------------------------  05-27-24 @ 21:53

## 2024-05-27 NOTE — ED PROVIDER NOTE - PROGRESS NOTE DETAILS
EK - spoke to radiology attending Dr. Muir - multiple pelvic fx but also suspected posterior bladder rupture.  Ortho aware.  [place] for trauma/urology evaluation. Authored by Trina Calvert DO: Patient arrived to [facility]. VS stable. Trauma bedside. Plan to obtain rest of CT scans, reassessment and dispo. Patient does not want anything for pain at this time. Authored by Trina Calvert DO: Urology placed padilla. Patient brought to obtain rest of CT scans including cystogram. Authored by Trina Calvert DO: Pt signed out to Dr. Chavis pending cts, results, dispo. pk: results reviewed, sicu consult placed, sepsis protocol initiated, pt admitted to surgical step down unit signed out to trauma team.

## 2024-05-27 NOTE — ED PROVIDER NOTE - NSICDXPASTSURGICALHX_GEN_ALL_CORE_FT
PAST SURGICAL HISTORY:  History of joint surgery Right HIP ORIF  [facility]    History of tonsillectomy and adenoidectomy 1957

## 2024-05-27 NOTE — ED PROVIDER NOTE - ATTENDING APP SHARED VISIT CONTRIBUTION OF CARE
74yF HTN DM R hip replacement p/w pelvic pain and inability to ambulate after a fall.  No LOC/head injury.  Not on a/c.

## 2024-05-27 NOTE — ED PROVIDER NOTE - NSICDXPASTMEDICALHX_GEN_ALL_CORE_FT
PAST MEDICAL HISTORY:  DM (diabetes mellitus)     HTN (hypertension)     Hypercholesteremia     OA (osteoarthritis)

## 2024-05-28 DIAGNOSIS — S22.39XA FRACTURE OF ONE RIB, UNSPECIFIED SIDE, INITIAL ENCOUNTER FOR CLOSED FRACTURE: ICD-10-CM

## 2024-05-28 LAB
A1C WITH ESTIMATED AVERAGE GLUCOSE RESULT: 8.4 % — HIGH (ref 4–5.6)
ANION GAP SERPL CALC-SCNC: 12 MMOL/L — SIGNIFICANT CHANGE UP (ref 7–14)
BASOPHILS # BLD AUTO: 0.06 K/UL — SIGNIFICANT CHANGE UP (ref 0–0.2)
BASOPHILS NFR BLD AUTO: 0.9 % — SIGNIFICANT CHANGE UP (ref 0–1)
BUN SERPL-MCNC: 19 MG/DL — SIGNIFICANT CHANGE UP (ref 10–20)
CALCIUM SERPL-MCNC: 9 MG/DL — SIGNIFICANT CHANGE UP (ref 8.4–10.5)
CHLORIDE SERPL-SCNC: 100 MMOL/L — SIGNIFICANT CHANGE UP (ref 98–110)
CO2 SERPL-SCNC: 26 MMOL/L — SIGNIFICANT CHANGE UP (ref 17–32)
CREAT SERPL-MCNC: 0.8 MG/DL — SIGNIFICANT CHANGE UP (ref 0.7–1.5)
EGFR: 78 ML/MIN/1.73M2 — SIGNIFICANT CHANGE UP
EOSINOPHIL # BLD AUTO: 0.04 K/UL — SIGNIFICANT CHANGE UP (ref 0–0.7)
EOSINOPHIL NFR BLD AUTO: 0.6 % — SIGNIFICANT CHANGE UP (ref 0–8)
ESTIMATED AVERAGE GLUCOSE: 194 MG/DL — HIGH (ref 68–114)
GLUCOSE BLDC GLUCOMTR-MCNC: 171 MG/DL — HIGH (ref 70–99)
GLUCOSE BLDC GLUCOMTR-MCNC: 198 MG/DL — HIGH (ref 70–99)
GLUCOSE BLDC GLUCOMTR-MCNC: 204 MG/DL — HIGH (ref 70–99)
GLUCOSE BLDC GLUCOMTR-MCNC: 219 MG/DL — HIGH (ref 70–99)
GLUCOSE BLDC GLUCOMTR-MCNC: 246 MG/DL — HIGH (ref 70–99)
GLUCOSE BLDC GLUCOMTR-MCNC: 249 MG/DL — HIGH (ref 70–99)
GLUCOSE BLDC GLUCOMTR-MCNC: 280 MG/DL — HIGH (ref 70–99)
GLUCOSE SERPL-MCNC: 173 MG/DL — HIGH (ref 70–99)
HCT VFR BLD CALC: 34.2 % — LOW (ref 37–47)
HGB BLD-MCNC: 11 G/DL — LOW (ref 12–16)
IMM GRANULOCYTES NFR BLD AUTO: 0.3 % — SIGNIFICANT CHANGE UP (ref 0.1–0.3)
LACTATE SERPL-SCNC: 2.1 MMOL/L — HIGH (ref 0.7–2)
LACTATE SERPL-SCNC: 2.3 MMOL/L — HIGH (ref 0.7–2)
LACTATE SERPL-SCNC: 3.1 MMOL/L — HIGH (ref 0.7–2)
LYMPHOCYTES # BLD AUTO: 1.52 K/UL — SIGNIFICANT CHANGE UP (ref 1.2–3.4)
LYMPHOCYTES # BLD AUTO: 22.3 % — SIGNIFICANT CHANGE UP (ref 20.5–51.1)
MAGNESIUM SERPL-MCNC: 1.7 MG/DL — LOW (ref 1.8–2.4)
MCHC RBC-ENTMCNC: 23.6 PG — LOW (ref 27–31)
MCHC RBC-ENTMCNC: 32.2 G/DL — SIGNIFICANT CHANGE UP (ref 32–37)
MCV RBC AUTO: 73.2 FL — LOW (ref 81–99)
MONOCYTES # BLD AUTO: 0.64 K/UL — HIGH (ref 0.1–0.6)
MONOCYTES NFR BLD AUTO: 9.4 % — HIGH (ref 1.7–9.3)
NEUTROPHILS # BLD AUTO: 4.55 K/UL — SIGNIFICANT CHANGE UP (ref 1.4–6.5)
NEUTROPHILS NFR BLD AUTO: 66.5 % — SIGNIFICANT CHANGE UP (ref 42.2–75.2)
NRBC # BLD: 0 /100 WBCS — SIGNIFICANT CHANGE UP (ref 0–0)
PHOSPHATE SERPL-MCNC: 4.3 MG/DL — SIGNIFICANT CHANGE UP (ref 2.1–4.9)
PLATELET # BLD AUTO: 202 K/UL — SIGNIFICANT CHANGE UP (ref 130–400)
PMV BLD: 10 FL — SIGNIFICANT CHANGE UP (ref 7.4–10.4)
POTASSIUM SERPL-MCNC: 3.8 MMOL/L — SIGNIFICANT CHANGE UP (ref 3.5–5)
POTASSIUM SERPL-SCNC: 3.8 MMOL/L — SIGNIFICANT CHANGE UP (ref 3.5–5)
RBC # BLD: 4.67 M/UL — SIGNIFICANT CHANGE UP (ref 4.2–5.4)
RBC # FLD: 15.9 % — HIGH (ref 11.5–14.5)
SODIUM SERPL-SCNC: 138 MMOL/L — SIGNIFICANT CHANGE UP (ref 135–146)
WBC # BLD: 6.83 K/UL — SIGNIFICANT CHANGE UP (ref 4.8–10.8)
WBC # FLD AUTO: 6.83 K/UL — SIGNIFICANT CHANGE UP (ref 4.8–10.8)

## 2024-05-28 PROCEDURE — 97535 SELF CARE MNGMENT TRAINING: CPT | Mod: GO

## 2024-05-28 PROCEDURE — 99291 CRITICAL CARE FIRST HOUR: CPT | Mod: 24,25

## 2024-05-28 PROCEDURE — 93010 ELECTROCARDIOGRAM REPORT: CPT

## 2024-05-28 PROCEDURE — 82962 GLUCOSE BLOOD TEST: CPT

## 2024-05-28 PROCEDURE — 83605 ASSAY OF LACTIC ACID: CPT

## 2024-05-28 PROCEDURE — 97110 THERAPEUTIC EXERCISES: CPT | Mod: GP

## 2024-05-28 PROCEDURE — 97116 GAIT TRAINING THERAPY: CPT | Mod: GP

## 2024-05-28 PROCEDURE — 97166 OT EVAL MOD COMPLEX 45 MIN: CPT | Mod: GO

## 2024-05-28 PROCEDURE — 83036 HEMOGLOBIN GLYCOSYLATED A1C: CPT

## 2024-05-28 PROCEDURE — 99221 1ST HOSP IP/OBS SF/LOW 40: CPT

## 2024-05-28 PROCEDURE — 85027 COMPLETE CBC AUTOMATED: CPT

## 2024-05-28 PROCEDURE — 97162 PT EVAL MOD COMPLEX 30 MIN: CPT | Mod: GP

## 2024-05-28 PROCEDURE — 83735 ASSAY OF MAGNESIUM: CPT

## 2024-05-28 PROCEDURE — 94010 BREATHING CAPACITY TEST: CPT

## 2024-05-28 PROCEDURE — 85025 COMPLETE CBC W/AUTO DIFF WBC: CPT

## 2024-05-28 PROCEDURE — 36415 COLL VENOUS BLD VENIPUNCTURE: CPT

## 2024-05-28 PROCEDURE — 84100 ASSAY OF PHOSPHORUS: CPT

## 2024-05-28 PROCEDURE — 93005 ELECTROCARDIOGRAM TRACING: CPT

## 2024-05-28 PROCEDURE — 74178 CT ABD&PLV WO CNTR FLWD CNTR: CPT | Mod: 26,MC

## 2024-05-28 PROCEDURE — 97530 THERAPEUTIC ACTIVITIES: CPT | Mod: GP

## 2024-05-28 PROCEDURE — 71260 CT THORAX DX C+: CPT | Mod: 26,MC

## 2024-05-28 PROCEDURE — 80048 BASIC METABOLIC PNL TOTAL CA: CPT

## 2024-05-28 RX ORDER — METOPROLOL TARTRATE 50 MG
100 TABLET ORAL DAILY
Refills: 0 | Status: DISCONTINUED | OUTPATIENT
Start: 2024-05-28 | End: 2024-05-28

## 2024-05-28 RX ORDER — SIMVASTATIN 20 MG/1
40 TABLET, FILM COATED ORAL AT BEDTIME
Refills: 0 | Status: DISCONTINUED | OUTPATIENT
Start: 2024-05-28 | End: 2024-06-03

## 2024-05-28 RX ORDER — INSULIN LISPRO 100/ML
VIAL (ML) SUBCUTANEOUS
Refills: 0 | Status: DISCONTINUED | OUTPATIENT
Start: 2024-05-28 | End: 2024-05-28

## 2024-05-28 RX ORDER — INSULIN GLARGINE 100 [IU]/ML
10 INJECTION, SOLUTION SUBCUTANEOUS ONCE
Refills: 0 | Status: COMPLETED | OUTPATIENT
Start: 2024-05-28 | End: 2024-05-28

## 2024-05-28 RX ORDER — METOPROLOL TARTRATE 50 MG
50 TABLET ORAL EVERY 12 HOURS
Refills: 0 | Status: DISCONTINUED | OUTPATIENT
Start: 2024-05-28 | End: 2024-06-03

## 2024-05-28 RX ORDER — CHLORHEXIDINE GLUCONATE 213 G/1000ML
1 SOLUTION TOPICAL ONCE
Refills: 0 | Status: COMPLETED | OUTPATIENT
Start: 2024-05-28 | End: 2024-05-29

## 2024-05-28 RX ORDER — GABAPENTIN 400 MG/1
100 CAPSULE ORAL EVERY 8 HOURS
Refills: 0 | Status: DISCONTINUED | OUTPATIENT
Start: 2024-05-28 | End: 2024-06-03

## 2024-05-28 RX ORDER — METHOCARBAMOL 500 MG/1
500 TABLET, FILM COATED ORAL EVERY 6 HOURS
Refills: 0 | Status: DISCONTINUED | OUTPATIENT
Start: 2024-05-28 | End: 2024-06-03

## 2024-05-28 RX ORDER — DEXTROSE 50 % IN WATER 50 %
15 SYRINGE (ML) INTRAVENOUS ONCE
Refills: 0 | Status: DISCONTINUED | OUTPATIENT
Start: 2024-05-28 | End: 2024-05-28

## 2024-05-28 RX ORDER — LIDOCAINE 4 G/100G
1 CREAM TOPICAL DAILY
Refills: 0 | Status: DISCONTINUED | OUTPATIENT
Start: 2024-05-28 | End: 2024-06-03

## 2024-05-28 RX ORDER — DEXTROSE 50 % IN WATER 50 %
25 SYRINGE (ML) INTRAVENOUS ONCE
Refills: 0 | Status: DISCONTINUED | OUTPATIENT
Start: 2024-05-28 | End: 2024-05-28

## 2024-05-28 RX ORDER — GLUCAGON INJECTION, SOLUTION 0.5 MG/.1ML
1 INJECTION, SOLUTION SUBCUTANEOUS ONCE
Refills: 0 | Status: DISCONTINUED | OUTPATIENT
Start: 2024-05-28 | End: 2024-05-28

## 2024-05-28 RX ORDER — LABETALOL HCL 100 MG
5 TABLET ORAL ONCE
Refills: 0 | Status: COMPLETED | OUTPATIENT
Start: 2024-05-28 | End: 2024-05-28

## 2024-05-28 RX ORDER — INSULIN LISPRO 100/ML
VIAL (ML) SUBCUTANEOUS EVERY 6 HOURS
Refills: 0 | Status: DISCONTINUED | OUTPATIENT
Start: 2024-05-28 | End: 2024-05-28

## 2024-05-28 RX ORDER — METHOCARBAMOL 500 MG/1
500 TABLET, FILM COATED ORAL EVERY 8 HOURS
Refills: 0 | Status: DISCONTINUED | OUTPATIENT
Start: 2024-05-28 | End: 2024-05-28

## 2024-05-28 RX ORDER — OXYCODONE HYDROCHLORIDE 5 MG/1
5 TABLET ORAL EVERY 6 HOURS
Refills: 0 | Status: DISCONTINUED | OUTPATIENT
Start: 2024-05-28 | End: 2024-05-28

## 2024-05-28 RX ORDER — SODIUM CHLORIDE 9 MG/ML
1000 INJECTION, SOLUTION INTRAVENOUS
Refills: 0 | Status: DISCONTINUED | OUTPATIENT
Start: 2024-05-28 | End: 2024-05-28

## 2024-05-28 RX ORDER — ACETAMINOPHEN 500 MG
650 TABLET ORAL ONCE
Refills: 0 | Status: COMPLETED | OUTPATIENT
Start: 2024-05-28 | End: 2024-05-28

## 2024-05-28 RX ORDER — AMLODIPINE BESYLATE 2.5 MG/1
5 TABLET ORAL DAILY
Refills: 0 | Status: DISCONTINUED | OUTPATIENT
Start: 2024-05-29 | End: 2024-06-03

## 2024-05-28 RX ORDER — DEXTROSE 10 % IN WATER 10 %
125 INTRAVENOUS SOLUTION INTRAVENOUS ONCE
Refills: 0 | Status: DISCONTINUED | OUTPATIENT
Start: 2024-05-28 | End: 2024-05-28

## 2024-05-28 RX ORDER — ACETAMINOPHEN 500 MG
650 TABLET ORAL EVERY 6 HOURS
Refills: 0 | Status: DISCONTINUED | OUTPATIENT
Start: 2024-05-28 | End: 2024-06-03

## 2024-05-28 RX ORDER — POTASSIUM CHLORIDE 20 MEQ
40 PACKET (EA) ORAL ONCE
Refills: 0 | Status: COMPLETED | OUTPATIENT
Start: 2024-05-28 | End: 2024-05-28

## 2024-05-28 RX ORDER — SENNA PLUS 8.6 MG/1
2 TABLET ORAL AT BEDTIME
Refills: 0 | Status: DISCONTINUED | OUTPATIENT
Start: 2024-05-28 | End: 2024-06-03

## 2024-05-28 RX ORDER — MAGNESIUM SULFATE 500 MG/ML
2 VIAL (ML) INJECTION ONCE
Refills: 0 | Status: COMPLETED | OUTPATIENT
Start: 2024-05-28 | End: 2024-05-28

## 2024-05-28 RX ORDER — ENOXAPARIN SODIUM 100 MG/ML
30 INJECTION SUBCUTANEOUS EVERY 12 HOURS
Refills: 0 | Status: DISCONTINUED | OUTPATIENT
Start: 2024-05-28 | End: 2024-06-03

## 2024-05-28 RX ORDER — SODIUM CHLORIDE 9 MG/ML
2000 INJECTION, SOLUTION INTRAVENOUS ONCE
Refills: 0 | Status: COMPLETED | OUTPATIENT
Start: 2024-05-28 | End: 2024-05-28

## 2024-05-28 RX ORDER — INSULIN LISPRO 100/ML
VIAL (ML) SUBCUTANEOUS
Refills: 0 | Status: DISCONTINUED | OUTPATIENT
Start: 2024-05-28 | End: 2024-05-31

## 2024-05-28 RX ORDER — AMLODIPINE BESYLATE 2.5 MG/1
5 TABLET ORAL DAILY
Refills: 0 | Status: DISCONTINUED | OUTPATIENT
Start: 2024-05-28 | End: 2024-05-28

## 2024-05-28 RX ORDER — INSULIN LISPRO 100/ML
5 VIAL (ML) SUBCUTANEOUS
Refills: 0 | Status: DISCONTINUED | OUTPATIENT
Start: 2024-05-28 | End: 2024-05-29

## 2024-05-28 RX ORDER — CEFTRIAXONE 500 MG/1
1000 INJECTION, POWDER, FOR SOLUTION INTRAMUSCULAR; INTRAVENOUS EVERY 24 HOURS
Refills: 0 | Status: COMPLETED | OUTPATIENT
Start: 2024-05-28 | End: 2024-05-30

## 2024-05-28 RX ADMIN — METHOCARBAMOL 500 MILLIGRAM(S): 500 TABLET, FILM COATED ORAL at 23:17

## 2024-05-28 RX ADMIN — Medication 650 MILLIGRAM(S): at 17:02

## 2024-05-28 RX ADMIN — SENNA PLUS 2 TABLET(S): 8.6 TABLET ORAL at 21:56

## 2024-05-28 RX ADMIN — METHOCARBAMOL 500 MILLIGRAM(S): 500 TABLET, FILM COATED ORAL at 05:01

## 2024-05-28 RX ADMIN — Medication 650 MILLIGRAM(S): at 05:00

## 2024-05-28 RX ADMIN — Medication 650 MILLIGRAM(S): at 18:02

## 2024-05-28 RX ADMIN — Medication 325 MILLIGRAM(S): at 11:13

## 2024-05-28 RX ADMIN — SODIUM CHLORIDE 110 MILLILITER(S): 9 INJECTION, SOLUTION INTRAVENOUS at 10:32

## 2024-05-28 RX ADMIN — CEFTRIAXONE 100 MILLIGRAM(S): 500 INJECTION, POWDER, FOR SOLUTION INTRAMUSCULAR; INTRAVENOUS at 00:53

## 2024-05-28 RX ADMIN — Medication 5: at 21:55

## 2024-05-28 RX ADMIN — Medication 5 UNIT(S): at 16:20

## 2024-05-28 RX ADMIN — LIDOCAINE 1 PATCH: 4 CREAM TOPICAL at 11:13

## 2024-05-28 RX ADMIN — LIDOCAINE 1 PATCH: 4 CREAM TOPICAL at 19:41

## 2024-05-28 RX ADMIN — SODIUM CHLORIDE 110 MILLILITER(S): 9 INJECTION, SOLUTION INTRAVENOUS at 04:10

## 2024-05-28 RX ADMIN — METHOCARBAMOL 500 MILLIGRAM(S): 500 TABLET, FILM COATED ORAL at 17:03

## 2024-05-28 RX ADMIN — METHOCARBAMOL 500 MILLIGRAM(S): 500 TABLET, FILM COATED ORAL at 11:09

## 2024-05-28 RX ADMIN — CEFTRIAXONE 100 MILLIGRAM(S): 500 INJECTION, POWDER, FOR SOLUTION INTRAMUSCULAR; INTRAVENOUS at 11:09

## 2024-05-28 RX ADMIN — Medication 11: at 11:49

## 2024-05-28 RX ADMIN — Medication 650 MILLIGRAM(S): at 01:57

## 2024-05-28 RX ADMIN — INSULIN GLARGINE 10 UNIT(S): 100 INJECTION, SOLUTION SUBCUTANEOUS at 17:02

## 2024-05-28 RX ADMIN — Medication 650 MILLIGRAM(S): at 23:16

## 2024-05-28 RX ADMIN — Medication 9: at 04:16

## 2024-05-28 RX ADMIN — Medication 7: at 16:20

## 2024-05-28 RX ADMIN — Medication 5 MILLIGRAM(S): at 03:20

## 2024-05-28 RX ADMIN — SODIUM CHLORIDE 2000 MILLILITER(S): 9 INJECTION, SOLUTION INTRAVENOUS at 01:57

## 2024-05-28 RX ADMIN — SIMVASTATIN 40 MILLIGRAM(S): 20 TABLET, FILM COATED ORAL at 21:58

## 2024-05-28 RX ADMIN — SODIUM CHLORIDE 2000 MILLILITER(S): 9 INJECTION, SOLUTION INTRAVENOUS at 04:07

## 2024-05-28 RX ADMIN — SODIUM CHLORIDE 50 MILLILITER(S): 9 INJECTION, SOLUTION INTRAVENOUS at 11:50

## 2024-05-28 RX ADMIN — GABAPENTIN 100 MILLIGRAM(S): 400 CAPSULE ORAL at 13:26

## 2024-05-28 RX ADMIN — ENOXAPARIN SODIUM 30 MILLIGRAM(S): 100 INJECTION SUBCUTANEOUS at 05:00

## 2024-05-28 RX ADMIN — GABAPENTIN 100 MILLIGRAM(S): 400 CAPSULE ORAL at 05:00

## 2024-05-28 RX ADMIN — Medication 25 GRAM(S): at 11:14

## 2024-05-28 RX ADMIN — ENOXAPARIN SODIUM 30 MILLIGRAM(S): 100 INJECTION SUBCUTANEOUS at 18:16

## 2024-05-28 RX ADMIN — GABAPENTIN 100 MILLIGRAM(S): 400 CAPSULE ORAL at 21:57

## 2024-05-28 RX ADMIN — CEFTRIAXONE 1000 MILLIGRAM(S): 500 INJECTION, POWDER, FOR SOLUTION INTRAMUSCULAR; INTRAVENOUS at 01:30

## 2024-05-28 RX ADMIN — Medication 20 MILLIEQUIVALENT(S): at 11:12

## 2024-05-28 RX ADMIN — Medication 650 MILLIGRAM(S): at 04:06

## 2024-05-28 RX ADMIN — Medication 50 MILLIGRAM(S): at 17:02

## 2024-05-28 RX ADMIN — AMLODIPINE BESYLATE 5 MILLIGRAM(S): 2.5 TABLET ORAL at 14:34

## 2024-05-28 RX ADMIN — LIDOCAINE 1 PATCH: 4 CREAM TOPICAL at 23:18

## 2024-05-28 RX ADMIN — Medication 50 MILLIGRAM(S): at 05:00

## 2024-05-28 NOTE — H&P ADULT - NSHPPHYSICALEXAM_GEN_ALL_CORE
Primary Survey:    A - airway intact  B - bilateral breath sounds and good chest rise  C - palpable pulses in all extremities  D - GCS 15 on arrival, FLORES  Exposure obtained    Vital Signs Last 24 Hrs  T(C): 37 (27 May 2024 21:01), Max: 37 (27 May 2024 21:01)  T(F): 98.6 (27 May 2024 21:01), Max: 98.6 (27 May 2024 21:01)  HR: 96 (27 May 2024 21:10) (81 - 96)  BP: 169/80 (27 May 2024 21:01) (155/78 - 169/80)  BP(mean): --  RR: 18 (27 May 2024 21:01) (18 - 18)  SpO2: 92% (27 May 2024 21:01) (92% - 98%)    Parameters below as of 27 May 2024 21:01  Patient On (Oxygen Delivery Method): room air      Secondary Survey:   General: NAD  HEENT: Normocephalic, atraumatic, EOMI, PEERLA. no scalp lacerations   Neck: Soft, midline trachea. no c-spine tenderness  Chest: No chest wall tenderness, no subcutaneous emphysema   Cardiac: S1, S2, RRR  Respiratory: Bilateral breath sounds, clear and equal bilaterally  Abdomen: Soft, non-distended, non-tender  Groin: Normal appearing, pelvis stable   Ext:  shortened and externally rotated RLE with pain but able to move hip with some pain,  Palpable Radial b/l UE, b/l DP palpable in LE.   Back: No T/L/S spine tenderness, No palpable runoff/stepoff/deformity

## 2024-05-28 NOTE — H&P ADULT - ATTENDING COMMENTS
Trauma Attending H&P Attestation    Patient seen and evaluated with the trauma team in the trauma bay upon arrival. All pertinent labs and radiographic imaging reviewed, pending final reports. Outpatient medications reviewed, including the presence of anticoagulants, if applicable. I agree with the resident's note above, including the physical exam findings, assessment and plan as documented with the following adjustments.     Trauma Level: [ ] Code  [x ] Alert  [ ] Consult [ x] Transfer in from [facility]   Patient is seen at the bedside at 21:35  Activation by:  [x ] ED physician [x ] EMS  Intubated in Field? [ ] Yes [x ] No  Intubated in ED? [ ] Yes [ x] No  Intubated in Trauma [place]? [ ] Yes [x ] No  ALLEN SCHULZ Patient is a 74y old  Female who presents with a chief complaint of ground level fall and sustained  PELVIC FRACTURES. patient was found to have air in the urinary bladder due emphysematous cystitis vs urinary bladder injury/rapture       Patient presented with GCS [15 ]  upon arrival to the trauma bay.  Allergies  No Known Allergies  Intolerances    PAST MEDICAL & SURGICAL HISTORY:  DM (diabetes mellitus)  Hypercholesteremia  HTN (hypertension)  OA (osteoarthritis)  History of joint surgery Right HIP ORIF  [facility]  History of tonsillectomy and adenoidectomy 1957    On AC/Antiplatelets [ ] Yes [ x] No              [ ] NOVACs, [ ] Coumadin, [ ] ASA, [ ] Antiplatelets     PE: pain on passive motion at the right hip   Assessment: Fall from standing                    pelvic fracture                    periprosthetic fracture hip                    cystitis vs bladder injury   PLAN  - Admit yo Trauma service/SDU  - supportive care  - GI/DVT prophylaxis  - pain management  - repeat studies as needed  - complete and follow up on trauma work up included but not limites to                          [x ] CXR [ x] PXR [ x] Extremities X-RAYs                          [x ] NCHCT [x ] C-Spine CT [x ] CT Chest [ x] CT Abdomen/Pelvis  [x ] FAST [ ] Other                          [x] CT cystogram                          [x ] Trauma Labs   [ ] Toxicology  -  I independently read and reviewed the above studies -> multiple right sided pelvic fractures, periprosthetic femur fracture, emphysematous cystitis  - Follow up Consults  [ ] Neurosurgery [x ] Orthopaedics [ ] Plastics [ ] Fascial/OMFS [ ] Opthalmology   [x ] Urology  [ ] ENT  [ ] Pediatric ICU                                  [x ] SICU/SDU [ ] Burn/Burn ICU  [ ] Medicine [ ] Geriatrics [ ] Cardiology/EP [ ] Hospice/Palliative Care                                      - IV ABx give as indicated [x ] Yes [ ] No  - Tetanus given as indicated [ ] Yes [x ] No  - Seizures prophylaxis  [ ] Yes,  [ x] No    Nayeli Michael MD, FACS  Trauma/ACS/Surgical Critical Care Attending

## 2024-05-28 NOTE — CONSULT NOTE ADULT - ASSESSMENT
IMPRESSION: Rehab of multitrauma / s/p fall with right inferior pubic rami fracture, right periprosthetic femoral shaft fracture, right medial acetabular wall fracture, right inferior iliac fracture (just superior to right acetabular prosthesis), bladder injury / HTN, HLD, DM, OA s/p right ABIMBOLA    PRECAUTIONS: [   ] Cardiac  [   ] Respiratory  [   ] Seizures [   ] Contact Isolation  [   ] Droplet Isolation  [   ] Other    Weight Bearing Status: TTWB RLE    RECOMMENDATION:    Out of Bed to Chair     DVT/Decubiti Prophylaxis    REHAB PLAN:     [  x  ] Bedside P/T 3-5 times a week   [  x  ]   Bedside O/T  2-3 times a week             [    ] Speech Therapy               [    ]  No Rehab Therapy Indicated   Conditioning/ROM                                    ADL  Bed Mobility                                               Conditioning/ROM  Transfers                                                     Bed Mobility  Sitting /Standing Balance                         Transfers                                        Gait Training                                               Sitting/Standing Balance  Stair Training [   ]Applicable                    Home equipment Eval                                                                        Splinting  [   ] Only      GOALS:   ADL   [  x  ]   Independent                    Transfers  [ x   ] Independent                          Ambulation  [  x  ] Independent     [  x   ] With device                            [    ]  CG                                                         [    ]  CG                                                                  [    ] CG                            [    ] Min A                                                   [    ] Min A                                                              [    ] Min  A          DISCHARGE PLAN:   [    ]  Good candidate for Intensive Rehabilitation/Hospital based                                             Will tolerate 3hrs Intensive Rehab Daily                                       [     ]  Short Term Rehab in Skilled Nursing Facility                                       [     ]  Home with Outpatient or  services                                         [   x  ]  Possible Candidate for Intensive Hospital based Rehab

## 2024-05-28 NOTE — PHYSICAL THERAPY INITIAL EVALUATION ADULT - ADDITIONAL COMMENTS
Pt lives alone in apartment connected to cousins home, 2 steps to enter, no steps inside. Pt ambulates without assistive device, independent in ADLs.

## 2024-05-28 NOTE — PHYSICAL THERAPY INITIAL EVALUATION ADULT - GENERAL OBSERVATIONS, REHAB EVAL
[phone] Pt received and left semifowlers in bed, NAD, pt agreeable to PT session +tele +padilla +IV

## 2024-05-28 NOTE — CONSULT NOTE ADULT - SUBJECTIVE AND OBJECTIVE BOX
ALELN SCHULZ  74y, Female  Allergy: No Known Allergies    Hospital Day:     as per initial summary   74yF w/ PMHx of HTN, HLD, DM, OA, right hip surgery seen as a trauma transfer from [facility] s/p mechanical fall today -HT, -LOC, -AC.   external signs of trauma include right externally rotated hip . Trauma assessment in ED: ABCs intact , GCS 15 , AAOx3,  FLORES.     Patient seen and examined earlier today. no major complaints      patient told me that hse takes mnetformin hctz and metoprolol at home     PMH/PSH:  PAST MEDICAL & SURGICAL HISTORY:  DM (diabetes mellitus)      Hypercholesteremia      HTN (hypertension)      OA (osteoarthritis)      History of joint surgery  Right HIP ORIF  [facility]      History of tonsillectomy and adenoidectomy  1957          LAST 24-Hr EVENTS:    VITALS:  T(F): 97.9 (05-28-24 @ 11:34), Max: 99.4 (05-28-24 @ 01:41)  HR: 87 (05-28-24 @ 11:34)  BP: 135/60 (05-28-24 @ 11:34) (135/60 - 182/81)  RR: 18 (05-28-24 @ 11:34)  SpO2: 96% (05-28-24 @ 11:34)          TESTS & MEASUREMENTS:  Weight/BMI  83.9 (05-27-24 @ 16:11)    05-27-24 @ 07:01  -  05-28-24 @ 07:00  --------------------------------------------------------  IN: 0 mL / OUT: 1000 mL / NET: -1000 mL                            11.0   6.83  )-----------( 202      ( 28 May 2024 08:02 )             34.2     PT/INR - ( 27 May 2024 20:00 )   PT: 12.80 sec;   INR: 1.12 ratio         PTT - ( 27 May 2024 20:00 )  PTT:33.5 sec  INR: 1.12 ratio (05-27-24 @ 20:00)    05-28    138  |  100  |  19  ----------------------------<  173<H>  3.8   |  26  |  0.8    Ca    9.0      28 May 2024 08:02  Phos  4.3     05-28  Mg     1.7     05-28    TPro  7.0  /  Alb  4.1  /  TBili  0.3  /  DBili  x   /  AST  18  /  ALT  15  /  AlkPhos  121<H>  05-27    LIVER FUNCTIONS - ( 27 May 2024 20:00 )  Alb: 4.1 g/dL / Pro: 7.0 g/dL / ALK PHOS: 121 U/L / ALT: 15 U/L / AST: 18 U/L / GGT: x                 Urinalysis with Rflx Culture (collected 05-27-24 @ 22:39)      Urinalysis Basic - ( 28 May 2024 08:02 )    Color: x / Appearance: x / SG: x / pH: x  Gluc: 173 mg/dL / Ketone: x  / Bili: x / Urobili: x   Blood: x / Protein: x / Nitrite: x   Leuk Esterase: x / RBC: x / WBC x   Sq Epi: x / Non Sq Epi: x / Bacteria: x                    A1C with Estimated Average Glucose Result: 8.4 % (05-28-24 @ 08:02)      Indwelling Urethral Catheter:     Connect To:  Straight Drainage/[place]    Indication:  Urologic Procedure,  Surgery    Additional Instructions:  DO NOT REMOVE without a discontinuation order (05-28-24 @ 02:34) (not performed)      RADIOLOGY, ECG, & ADDITIONAL TESTS:  12 Lead ECG:   Ventricular Rate 98 BPM    Atrial Rate 98 BPM    P-R Interval 184 ms    QRS Duration 86 ms    Q-T Interval 338 ms    QTC Calculation(Bazett) 431 ms    P Axis 99 degrees    R Axis -31 degrees    T Axis 51 degrees    Diagnosis Line Normal sinus rhythm  Left axis deviation  Poor R wave progression  Abnormal ECG    Confirmed by Miguel Mendez ([id number]) on 5/28/2024 8:40:09 AM (05-28-24 @ 03:16)      RECENT DIAGNOSTIC ORDERS:  Lactate, Blood: 20:00 (05-28-24 @ 09:37)  Complete Blood Count: 20:00 (05-28-24 @ 08:13)  Phosphorus: 20:00 (05-28-24 @ 08:13)  Magnesium: 20:00 (05-28-24 @ 08:13)  Basic Metabolic Panel: 20:00 (05-28-24 @ 08:13)  Diet, DASH/TLC:   Sodium & Cholesterol Restricted  Consistent Carbohydrate No Snacks (CSTCHO) (05-28-24 @ 07:59)  Culture - Blood: STAT  Specimen Source: Blood (05-28-24 @ 01:40)  Culture - Blood: STAT  Specimen Source: Blood (05-28-24 @ 01:40)  Culture - Urine: 22:39 (05-27-24 @ 22:47)      MEDICATIONS:  MEDICATIONS  (STANDING):  acetaminophen     Tablet .. 650 milliGRAM(s) Oral every 6 hours  cefTRIAXone   IVPB 1000 milliGRAM(s) IV Intermittent every 24 hours  chlorhexidine 2% Cloths 1 Application(s) Topical once  enoxaparin Injectable 30 milliGRAM(s) SubCutaneous every 12 hours  gabapentin 100 milliGRAM(s) Oral every 8 hours  hydrochlorothiazide 25 milliGRAM(s) Oral daily  insulin lispro (ADMELOG) corrective regimen sliding scale   SubCutaneous Before meals and at bedtime  lactated ringers. 1000 milliLiter(s) (50 mL/Hr) IV Continuous <Continuous>  lidocaine   4% Patch 1 Patch Transdermal daily  methocarbamol 500 milliGRAM(s) Oral every 6 hours  metoprolol tartrate 50 milliGRAM(s) Oral every 12 hours  senna 2 Tablet(s) Oral at bedtime  simvastatin 40 milliGRAM(s) Oral at bedtime    MEDICATIONS  (PRN):      HOME MEDICATIONS:  gabapentin 100 mg oral capsule (01-04)  hydroCHLOROthiazide 25 mg oral tablet (01-02)  metFORMIN 1000 mg oral tablet (01-02)  metoprolol succinate 100 mg oral tablet, extended release (01-02)  Zocor with Ezatimibe 10/10mg (01-02)      PHYSICAL EXAM:  GENERAL: Patient lying on bed, comfortable   CHEST/LUNG: NVB, no wheezing   HEART: R1+R2, RRR  ABDOMEN: Soft. non tender, BS positive  EXTREMITIES:  no edema,  CNS: AAAx4. No cranial nerves deficit.

## 2024-05-28 NOTE — H&P ADULT - NSICDXPASTSURGICALHX_GEN_ALL_CORE_FT
PAST SURGICAL HISTORY:  History of joint surgery Right HIP ORIF  [facility]    History of tonsillectomy and adenoidectomy 1957

## 2024-05-28 NOTE — ED ADULT NURSE REASSESSMENT NOTE - NS ED NURSE REASSESS COMMENT FT1
report given to JARRED Biswas. pt made aware of transfer. personal belongings transferred with patient. VSS upon transfer. padilla catheter in place, CAUTI completed. pt made aware of transfer
EMS transport here to p/u patient, report given to Hank charge JARRED Lyons
BIBA from [facility] as a Trauma Alert s/p fall at home. Patient complaining of mild R hip pain. Vitals stable, Trauma Alert called upon arrival.
received pt transfer from [facility] for trauma alert. see trauma flowsheet for information. pt states she fell off 1 step, -HT, -LOC, -AC however pt with possible pelvis fracture pending CT with cystoscopy. pt A&Ox4; Bedside cardiac monitor in place; VSS at this time. Pt on room air. Denies any chest pain or SOB at this time. received pt with RAC 18G IV in place and intact that was present upon arrival from [facility]. pt c/o mild R hip pain. Call bell and bed alarm active. Pt made aware of plan of care.

## 2024-05-28 NOTE — PHYSICAL THERAPY INITIAL EVALUATION ADULT - GAIT DEVIATIONS NOTED, PT EVAL
decreased peri/increased time in double stance/decreased step length/decreased weight-shifting ability

## 2024-05-28 NOTE — CONSULT NOTE ADULT - CONSULT REASON
Hemodynamic monitoring
fall
Right Acetabular fx s/p R ABIMBOLA
right sided pelvic fxs and possible bladder injury
Possible posterior bladder rupture seen on CT Pelvis
Medical consult

## 2024-05-28 NOTE — CONSULT NOTE ADULT - SUBJECTIVE AND OBJECTIVE BOX
HPI:    74yF w/ PMHx of HTN, HLD, DM, OA, right hip surgery seen as a trauma transfer from [facility] s/p mechanical fall today -HT, -LOC, -AC.  Patient was entering her friend's home from the garage when she tripped over 1 step falling on to her right side. She was not able to get up and walk without great amount of pain. Denies pain to anywhere else. Denies chest pain, SOB, fever or chills, abdominal pain or difficulty urinating. Trauma assessment in ED: ABCs intact , GCS 15 , AAOx3.      Injuries:  - right inferior pubic rami fracture  - right periprosthetic femoral shaft fracture  - right medial acetabular wall fracture  - right inferior iliac fracture (just superior to right acetabular prosthesis)  - bladder injury (concern for posterior bladder rupture)    #Bladder injury (suspected posterior bladder wall rupture)  - urology following  - CT cystogram prelim negative for extravasation of contrast    - ortho following  - no acute surgical intervention  - f/u outpatient with Dr. Beard in 2 weeks   Activity - Weight Bearing Status:     Left Lower Extremity:  Full Weight Bearing    Right Lower Extremity:  Toe Touch Weight Bearing     PAST MEDICAL & SURGICAL HISTORY:  DM (diabetes mellitus)      Hypercholesteremia      HTN (hypertension)      OA (osteoarthritis)      History of joint surgery  Right HIP ORIF  [facility]      History of tonsillectomy and adenoidectomy  1957          Hospital Course:    TODAY'S SUBJECTIVE & REVIEW OF SYMPTOMS:     Constitutional WNL   Cardio WNL   Resp WNL   GI WNL  Heme WNL  Endo WNL  Skin WNL  MSK right hip pain   Neuro WNL  Cognitive WNL  Psych WNL      MEDICATIONS  (STANDING):  acetaminophen     Tablet .. 650 milliGRAM(s) Oral every 6 hours  cefTRIAXone   IVPB 1000 milliGRAM(s) IV Intermittent every 24 hours  chlorhexidine 2% Cloths 1 Application(s) Topical once  enoxaparin Injectable 30 milliGRAM(s) SubCutaneous every 12 hours  gabapentin 100 milliGRAM(s) Oral every 8 hours  hydrochlorothiazide 25 milliGRAM(s) Oral daily  insulin lispro (ADMELOG) corrective regimen sliding scale   SubCutaneous Before meals and at bedtime  lactated ringers. 1000 milliLiter(s) (110 mL/Hr) IV Continuous <Continuous>  lidocaine   4% Patch 1 Patch Transdermal daily  magnesium sulfate  IVPB 2 Gram(s) IV Intermittent once  methocarbamol 500 milliGRAM(s) Oral every 6 hours  metoprolol tartrate 50 milliGRAM(s) Oral every 12 hours  potassium chloride    Tablet ER 40 milliEquivalent(s) Oral once  senna 2 Tablet(s) Oral at bedtime  simvastatin 40 milliGRAM(s) Oral at bedtime    MEDICATIONS  (PRN):      FAMILY HISTORY:  HTN (hypertension) (Father, Mother)        Allergies    No Known Allergies    Intolerances        SOCIAL HISTORY:    [  ] Etoh  [  ] Smoking  [  ] Substance abuse     Home Environment:  [ x  ] Home Alone  [   ] Lives with Family  [   ] Home Health Aid    Dwelling:  [   ] Apartment  [ x  ] Private House  [   ] Adult Home  [   ] Skilled Nursing Facility      [   ] Short Term  [   ] Long Term  [ x  ] Stairs       Elevator [   ]    FUNCTIONAL STATUS PTA: (Check all that apply)  Ambulation: [  x  ]Independent    [   ] Dependent     [   ] Non-Ambulatory  Assistive Device: [   ] SA Cane  [   ]  Q Cane  [   ] Walker  [   ]  Wheelchair  ADL : [ x  ] Independent  [    ]  Dependent       Vital Signs Last 24 Hrs  T(C): 37.1 (28 May 2024 07:34), Max: 37.4 (28 May 2024 01:41)  T(F): 98.7 (28 May 2024 07:34), Max: 99.4 (28 May 2024 01:41)  HR: 75 (28 May 2024 07:34) (75 - 105)  BP: 157/72 (28 May 2024 07:34) (155/78 - 182/81)  BP(mean): 104 (28 May 2024 04:14) (96 - 121)  RR: 18 (28 May 2024 07:34) (16 - 18)  SpO2: 94% (28 May 2024 07:34) (92% - 98%)    Parameters below as of 28 May 2024 06:30  Patient On (Oxygen Delivery Method): room air          PHYSICAL EXAM: Awake & Alert  GENERAL: NAD  HEAD:  Normocephalic  CHEST/LUNG: Clear   HEART: S1S2+  ABDOMEN: Soft, Nontender  EXTREMITIES:  no calf tenderness    NERVOUS SYSTEM:  Cranial Nerves 2-12 intact [   ] Abnormal  [   ]  ROM: WFL all extremities [   ]  Abnormal [ x  ]limited RLE  Motor Strength: WFL all extremities  [   ]  Abnormal [  x ]limited RLE, 5/5 hortensia UE and LLE  Sensation: intact to light touch [ x  ] Abnormal [   ]    FUNCTIONAL STATUS:  Bed Mobility: Independent [   ]  Supervision [   ]  Needs Assistance [ x  ]  N/A [   ]  Transfers: Independent [   ]  Supervision [   ]  Needs Assistance [   ]  N/A [   ]   Ambulation: Independent [   ]  Supervision [   ]  Needs Assistance [   ]  N/A [   ]  ADL: Independent [   ] Requires Assistance [   ] N/A [   ]      LABS:                        11.0   6.83  )-----------( 202      ( 28 May 2024 08:02 )             34.2     05-28    138  |  100  |  19  ----------------------------<  173<H>  3.8   |  26  |  0.8    Ca    9.0      28 May 2024 08:02  Phos  4.3     05-28  Mg     1.7     05-28    TPro  7.0  /  Alb  4.1  /  TBili  0.3  /  DBili  x   /  AST  18  /  ALT  15  /  AlkPhos  121<H>  05-27    PT/INR - ( 27 May 2024 20:00 )   PT: 12.80 sec;   INR: 1.12 ratio         PTT - ( 27 May 2024 20:00 )  PTT:33.5 sec  Urinalysis Basic - ( 28 May 2024 08:02 )    Color: x / Appearance: x / SG: x / pH: x  Gluc: 173 mg/dL / Ketone: x  / Bili: x / Urobili: x   Blood: x / Protein: x / Nitrite: x   Leuk Esterase: x / RBC: x / WBC x   Sq Epi: x / Non Sq Epi: x / Bacteria: x        RADIOLOGY & ADDITIONAL STUDIES:

## 2024-05-28 NOTE — H&P ADULT - NSHPLABSRESULTS_GEN_ALL_CORE
Labs:  CAPILLARY BLOOD GLUCOSE      POCT Blood Glucose.: 226 mg/dL (27 May 2024 21:08)                          12.4   6.81  )-----------( 252      ( 27 May 2024 20:00 )             38.6       Auto Neutrophil %: 76.0 % (05-27-24 @ 20:00)  Auto Immature Granulocyte %: 0.6 % (05-27-24 @ 20:00)    05-27    134<L>  |  95<L>  |  25<H>  ----------------------------<  243<H>  4.3   |  23  |  0.8      Calcium: 8.9 mg/dL (05-27-24 @ 20:00)      LFTs:             7.0  | 0.3  | 18       ------------------[121     ( 27 May 2024 20:00 )  4.1  | x    | 15          Lipase:x      Amylase:x             Coags:     12.80  ----< 1.12    ( 27 May 2024 20:00 )     33.5                Urinalysis Basic - ( 27 May 2024 20:00 )    Color: x / Appearance: x / SG: x / pH: x  Gluc: 243 mg/dL / Ketone: x  / Bili: x / Urobili: x   Blood: x / Protein: x / Nitrite: x   Leuk Esterase: x / RBC: x / WBC x   Sq Epi: x / Non Sq Epi: x / Bacteria: x    RADIOLOGY & ADDITIONAL STUDIES:  < from: Xray Pelvis AP only (05.27.24 @ 16:42) >    Right inferior pubic ramus fracture.    Post right hip arthroplasty. No evidence for hardware malfunction.    < end of copied text >  < from: Xray Hip 2-3 Views, Right (05.27.24 @ 16:42) >  Post right hip arthroplasty. No evidence for acute fracture or hardware   malfunction.      < end of copied text >  < from: CT Pelvis No Cont (05.27.24 @ 19:04) >  Extensive right-sided pelvic fractures as described.    Right periprosthetic fracture of the femoral shaft.    Extensive air within the urinary bladder. Posterior urinary bladder   rupture is suspected. Next

## 2024-05-28 NOTE — CONSULT NOTE ADULT - ASSESSMENT
as per initial summary     74yF w/ PMHx of HTN, HLD, DM, OA, right hip surgery seen as a trauma transfer from [facility] s/p mechanical fall today -HT, -LOC, -AC.   external signs of trauma include right externally rotated hip . Trauma assessment in ED: ABCs intact , GCS 15 , AAOx3,  FLORES.   Injuries identified:   - right comminuted pubic rami fx  - right medial acetabular wall fx  - inferior right iliac fx  - periprosthetic femoral shaft fx    - age indeterminate right 4-6 rib fxs. patient nontender on exam    #fall  appears to be mechanical . no syncope, palpitations or lightheadedness prior to fall.   plan as per trauma and orthopedics  PT OT once cleared by trauma/ortho    # bladder injury"?  UTI?  Urinary bladder pneumatosis  follow urology recommendations  foleys in place   on ceftriqaxone  UA positive patient asymptomatic   follow urince and blood cultures     #1.3 cm right thyroid nodule. 9 mm left thyroid nodule.  OP US and endocrinology follow up     #HTN  on HCTZ and metoprolol  monitor BP     #DM  metformin at home  SSI here  monitor glucose     hypomagnesemia  monitor  pending mag level    follow up: follow ortho, trauma, urology, monitor ,maqgnesium, BP and glucose levels, follow blood and urine cultures    Please call hospitalist team with any questions

## 2024-05-28 NOTE — PHYSICAL THERAPY INITIAL EVALUATION ADULT - ACTIVE RANGE OF MOTION EXAMINATION, REHAB EVAL
inc difficulty with R hip flexion due to fx/bilateral  lower extremity Active ROM was WFL (within functional limits)

## 2024-05-28 NOTE — CONSULT NOTE ADULT - ASSESSMENT
74y Female s/p mechanical fall (-HT, -LOC, -AC)    Injuries:  - right inferior pubic rami fracture  - right periprosthetic femoral shaft fracture  - right medial acetabular wall fracture  - right inferior iliac fracture (just superior to right acetabular prosthesis)  - chronic right 4-6th rib fractures  - bladder injury (concern for posterior bladder rupture)    NEUROLOGICAL:  #Acute pain  - tylenol 650 q6  - gabapentin 100 q8  - robaxin 500 q6  - oxycodone PRN    RESPIRATORY:   - on room air  - encourage IS 10 times per hour  #Activity    -increase as tolerated  #Chronic right 4-6th rib fractures    CARDIOVASCULAR:   #Hypertensive urgency  - 5mg labetalol IV push x1  #HTN  - continue home metoprolol 50 q12  - continue home HCTZ 25 qd  #HLD  - simvastatin 40 qd (therapeutic interchange for ezetimibe/simvastatin combo that she takes at home)    GASTROINTESTINAL/NUTRITION:   #Diet, NPO except meds     -aspiration precautions, HOB 30  #GI Prophylaxis    -not indicated  #Bowel regimen  - senna      /RENAL:   #urine output in critically ill    -indwelling padilla (placed 5/27)    Labs          Electrolytes-(05-27 @ 20:00)Na 134 // K 4.3 // Mg -- // Phos --   #Bladder injury (suspected posterior bladder wall rupture)  - urology following  - CT cystogram prelim negative for extravasation of contrast         HEME/ONC:   #DVT prophylaxis     -Chemical: enoxaparin Injectable 30 milliGRAM(s) SubCutaneous every 12 hours     -Mechanical: SCDs       Labs: Hb/Hct:  12.4/38.6  (05-27 @ 20:00)  -->                      Plts:  252  -->                 PTT/INR:  33.5/1.12  --->     T&S Expires: 5/30    ID:  #UTI on admission  WBC- 6.81  --->>  Temp trend- 24hrs T(F): 99.4 (05-28 @ 01:41), Max: 99.4 (05-28 @ 01:41)  Current antibiotics- cefTRIAXone   IVPB 1000 every 24 hours      ENDOCRINOLOGY:  #DM  - q6 fingersticks while NPO  -Glucose goal 140-180  - ISS    MSK:  #right inferior pubic rami fracture  #right periprosthetic femoral shaft fracture  #right medial acetabular wall fracture  #right inferior iliac fracture (just superior to right acetabular prosthesis)  - ortho following  - no acute surgical intervention  - f/u outpatient with Dr. Beard in 2 weeks   Activity - Weight Bearing Status:     Left Lower Extremity:  Full Weight Bearing    Right Lower Extremity:  Toe Touch Weight Bearing (05-28-24 @ 02:34)  Activity - Ambulate with Assistance:     Time/Priority:  Routine (05-28-24 @ 02:34)      LINES/DRAINS:  PIVs, Padilla (5/27 - )    ADVANCED DIRECTIVES:  Full Code    HCP/Emergency Contact-    INDICATION FOR SICU/SDU: Fracture of one rib, unspecified side, initial encounter for closed fracture             DISPO:   SDU. Case discussed with SICU attending Dr. Michael.

## 2024-05-28 NOTE — H&P ADULT - ASSESSMENT
ASSESSMENT:  4yF w/ PMHx of HTN, HLD, DM, OA, right hip surgery seen as a trauma transfer from [facility] s/p mechanical fall today -HT, -LOC, -AC.   external signs of trauma include right externally rotated hip . Trauma assessment in ED: ABCs intact , GCS 15 , AAOx3,  FLORES.     Injuries identified:   - right comminuted pubic rami fx  - right medial acetabular wall fx  - inferior right iliac fx  - periprosthetic femoral shaft fx    PLAN:   - Trauma Labs: (CBC, BMP, Coags, T&S, UA, EtOH level)  Additional studies:  EKG  Utox    Trauma Imaging to include the following:  - CXR, Pelvic Xray  -CT Chest, CT Abd/Pelvis (cystogram)   - Extremity films: RLE per ortho    Additional consultations:  - Orthopedics- non op management trial, toe touch weight bearing on right, repeat xray in 2 weeks. F/U with Dr Dena ramos  - Urology - padilla placed and CT cysto performed to rule out traumatic bladder rupture in the setting of pelvic fractures     Patient to be admitted to trauma to surgical stepdown unit.   Multimodal pain control for acute pain  saturating well on room air   continue home BP meds with parameters   IS 10x per hour  carb consistent diet   keep padilla as of now. f/u uro reccs   UTI on admission. continue rocephin   On metformin at home. will start ISS   DVT ppx  PT/OT/physiatry consult   social work consult     Above plan discussed with Trauma attending, Dr. Michael , patient, patient family, and ED team  --------------------------------------------------------------------------------------  05-27-24 @ 21:53 ASSESSMENT:  4yF w/ PMHx of HTN, HLD, DM, OA, right hip surgery seen as a trauma transfer from [facility] s/p mechanical fall today -HT, -LOC, -AC.   external signs of trauma include right externally rotated hip . Trauma assessment in ED: ABCs intact , GCS 15 , AAOx3,  FLORES.     Injuries identified:   - right comminuted pubic rami fx  - right medial acetabular wall fx  - inferior right iliac fx  - periprosthetic femoral shaft fx    - age indeterminate right 4-6 rib fxs. patient nontender on exam    PLAN:   - Trauma Labs: (CBC, BMP, Coags, T&S, UA, EtOH level)  Additional studies:  EKG  Utox    Trauma Imaging to include the following:  - CXR, Pelvic Xray  -CT Chest, CT Abd/Pelvis (cystogram)   - Extremity films: RLE per ortho    Additional consultations:  - Orthopedics- non op management trial, toe touch weight bearing on right, repeat xray in 2 weeks. F/U with Dr Dena ramos  - Urology - padilla placed and CT cysto performed to rule out traumatic bladder rupture in the setting of pelvic fractures     Patient to be admitted to trauma to surgical stepdown unit.   Multimodal pain control for acute pain  saturating well on room air   continue home BP meds with parameters   IS 10x per hour  carb consistent diet   keep padilla as of now. f/u uro reccs   UTI on admission. continue rocephin   On metformin at home. will start ISS   DVT ppx  PT/OT/physiatry consult   social work consult     Above plan discussed with Trauma attending, Dr. Michael , patient, patient family, and ED team  --------------------------------------------------------------------------------------  05-27-24 @ 21:53 ASSESSMENT:  74yF w/ PMHx of HTN, HLD, DM, OA, right hip surgery seen as a trauma transfer from [facility] s/p mechanical fall today -HT, -LOC, -AC.   external signs of trauma include right externally rotated hip . Trauma assessment in ED: ABCs intact , GCS 15 , AAOx3,  FLORES.     Injuries identified:   - right comminuted pubic rami fx  - right medial acetabular wall fx  - inferior right iliac fx  - periprosthetic femoral shaft fx    - age indeterminate right 4-6 rib fxs. patient nontender on exam    PLAN:   - Trauma Labs: (CBC, BMP, Coags, T&S, UA, EtOH level)  Additional studies:  EKG  Utox    Trauma Imaging to include the following:  - CXR, Pelvic Xray  -CT Chest, CT Abd/Pelvis (cystogram)   - Extremity films: RLE per ortho    Additional consultations:  - Orthopedics- non op management trial, toe touch weight bearing on right, repeat xray in 2 weeks. F/U with Dr Dena ramos  - Urology - padilla placed and CT cysto performed to rule out traumatic bladder rupture in the setting of pelvic fractures     Patient to be admitted to trauma to surgical stepdown unit.   Multimodal pain control for acute pain  saturating well on room air   continue home BP meds with parameters   IS 10x per hour  carb consistent diet   keep padilla as of now. f/u uro reccs   UTI on admission. continue rocephin   On metformin at home. will start ISS   DVT ppx  PT/OT/physiatry consult   social work consult     Above plan discussed with Trauma attending, Dr. Michael , patient, patient family, and ED team  --------------------------------------------------------------------------------------  05-27-24 @ 21:53

## 2024-05-28 NOTE — CONSULT NOTE ADULT - CONSULT REQUESTED DATE/TIME
27-May-2024 21:44
27-May-2024 21:53
27-May-2024 21:37
28-May-2024 02:14
28-May-2024 13:45
28-May-2024 10:40

## 2024-05-28 NOTE — PHYSICAL THERAPY INITIAL EVALUATION ADULT - ORIENTATION, REHAB EVAL
oriented to person, place, time and situation Infliximab Counseling:  I discussed with the patient the risks of infliximab including but not limited to myelosuppression, immunosuppression, autoimmune hepatitis, demyelinating diseases, lymphoma, and serious infections.  The patient understands that monitoring is required including a PPD at baseline and must alert us or the primary physician if symptoms of infection or other concerning signs are noted.

## 2024-05-28 NOTE — PHYSICAL THERAPY INITIAL EVALUATION ADULT - PERTINENT HX OF CURRENT PROBLEM, REHAB EVAL
74yF w/ PMHx of HTN, HLD, DM, OA, right hip surgery seen as a trauma transfer from [facility] s/p mechanical fall today -HT, -LOC, -AC.  Patient was entering her friend's home from the garage when she tripped over 1 step falling on to her right side. She was not able to get up and walk without great amount of pain. Denies pain to anywhere else. Denies chest pain, SOB, fever or chills, abdominal pain or difficulty urinating. Trauma assessment in ED: ABCs intact , GCS 15 , AAOx3.

## 2024-05-28 NOTE — PHYSICAL THERAPY INITIAL EVALUATION ADULT - ASSISTIVE DEVICE FOR TRANSFER: GAIT, REHAB EVAL
Due to ESRD and Cardiomyopathy. Renal to see for dialysis. May need thoracocentesis especially with fall on Coumadin. Monitor Hgb.
rolling walker

## 2024-05-28 NOTE — OCCUPATIONAL THERAPY INITIAL EVALUATION ADULT - SPECIFY REASON(S)
Attempted to complete OT IE, however upon initial interview, transport present to transfer pr to unit from ED. OT to follow up when available for full assessment.

## 2024-05-28 NOTE — CONSULT NOTE ADULT - SUBJECTIVE AND OBJECTIVE BOX
ALLEN SCHULZ   [id number]   [birth year]  74yF    ============================   SICU Consult for hemodynamic monitoring       24 Hour Events  -Admission under SICU service    ============================       HPI:  74yF w/ PMHx of HTN, HLD, DM, OA s/p right ABIMBOLA, seen as a trauma transfer from [facility] s/p mechanical fall today -HT, -LOC, -AC.  Patient was entering her cousin's home from the garage when she tripped over 1 step falling on to her right side. She was unable to ambulate after the fall. Denies pain to anywhere else. Denies chest pain, SOB, fever or chills, abdominal pain or difficulty urinating. Trauma assessment in ED: ABCs intact , GCS 15 , AAOx3.    SICU Consult:  Patient was seen bedside in the ED. Patient is a/ox3, not in any acute distress. Patient reports right hip pain when moving but otherwise denies all pain when sitting still. Patient denies chest pain, dyspnea, anxiety, abdominal pain, nausea, paresthesias. Vital signs: systolic BP 180s,  sinus rhythm, SpO2 96% on room air.    PAST MEDICAL & SURGICAL HISTORY:  DM (diabetes mellitus)  Hypercholesteremia  HTN (hypertension)  OA (osteoarthritis)  History of joint surgery  Right HIP ORIF  [facility]  History of tonsillectomy and adenoidectomy        Allergies  No Known Allergies  Intolerances (28 May 2024 02:13)    Injuries:  - right inferior pubic rami fracture  - right periprosthetic femoral shaft fracture  - right medial acetabular wall fracture  - right inferior iliac fracture (just superior to right acetabular prosthesis)  - chronic right 4-6th rib fractures  - bladder injury (concern for posterior bladder rupture)    [X] A ten-point review of systems was otherwise negative except as noted above.  [  ] Due to altered mental status/intubation, subjective information was not attained from the patient. History was obtained, to the extent possible, from review of the chart and collateral sources of information.    Past Medical History and Surgical History  DM (diabetes mellitus)    Hypercholesteremia    HTN (hypertension)    OA (osteoarthritis)        Home Medications:  gabapentin 100 mg oral capsule: 1 cap(s) orally 3 times a day  hydroCHLOROthiazide 25 mg oral tablet: 1 tab(s) orally once a day  metFORMIN 1000 mg oral tablet: 1 tab(s) orally 2 times a day  metoprolol succinate 100 mg oral tablet, extended release: 1 tab(s) orally once a day  Zocor with Ezatimibe 10/10m tab(s) orally once a day (at bedtime)    Allergies  No Known Allergies       Current Medications:  acetaminophen     Tablet .. 650 milliGRAM(s) Oral every 6 hours  cefTRIAXone   IVPB 1000 milliGRAM(s) IV Intermittent every 24 hours  chlorhexidine 2% Cloths 1 Application(s) Topical once  enoxaparin Injectable 30 milliGRAM(s) SubCutaneous every 12 hours  gabapentin 100 milliGRAM(s) Oral every 8 hours  hydrochlorothiazide 25 milliGRAM(s) Oral daily  insulin lispro (ADMELOG) corrective regimen sliding scale   SubCutaneous every 6 hours  labetalol Injectable 5 milliGRAM(s) IV Push Once  lactated ringers. 1000 milliLiter(s) (110 mL/Hr) IV Continuous <Continuous>  lidocaine   4% Patch 1 Patch Transdermal daily  methocarbamol 500 milliGRAM(s) Oral every 6 hours  metoprolol tartrate 50 milliGRAM(s) Oral every 12 hours  oxyCODONE    IR 5 milliGRAM(s) Oral every 6 hours PRN Severe Pain (7 - 10)  simvastatin 40 milliGRAM(s) Oral at bedtime      Vital Sings, Intake/Output (Last 24Hours)  ICU Vital Signs Last 24 Hrs  T(C): 37.4 (28 May 2024 01:41), Max: 37.4 (28 May 2024 01:41)  T(F): 99.4 (28 May 2024 01:41), Max: 99.4 (28 May 2024 01:41)  HR: 105 (28 May 2024 01:41) (81 - 105)  BP: 170/71 (28 May 2024 00:56) (155/78 - 170/71)  BP(mean): 121 (28 May 2024 00:56) (121 - 121)  ABP: --  ABP(mean): --  RR: 16 (28 May 2024 00:56) (16 - 18)  SpO2: 94% (28 May 2024 00:56) (92% - 98%)    O2 Parameters below as of 28 May 2024 00:56  Patient On (Oxygen Delivery Method): room air          I&O's Summary      Physical Exam:  ----------------------------------------------------------------------------------------------------------   GCS:    15  Exam: A&Ox3, no focal deficits, moving all bilateral extremities    RESPIRATORY:  Normal expansion/effort, SpO2 96% on room air. Lung sounds present and clear bilaterally to auscultation.    CARDIOVASCULAR:  sinus tachycardia (), systolic BP 180s, EKG NSR    GASTROINTESTINAL:  Abdomen soft, non-tender, non-distended    MUSCULOSKELETAL:  Extremities warm, pink, well-perfused. Right dorsiflexion/plantarflexion strength 5/5, sensation to touch is intact in bilateral lower extremities, palpable PT pulses bilaterally. Right hip soft and non-tender to palpation.    DERM:  No skin breakdown     :   Exam: Padilla catheter in place.     Tubes/Lines/Drains   ----------------------------------------------------------------------------------------------------------  - bilateral PIVs  - indwelling padilla

## 2024-05-29 LAB
ANION GAP SERPL CALC-SCNC: 11 MMOL/L — SIGNIFICANT CHANGE UP (ref 7–14)
ANION GAP SERPL CALC-SCNC: 12 MMOL/L — SIGNIFICANT CHANGE UP (ref 7–14)
BUN SERPL-MCNC: 19 MG/DL — SIGNIFICANT CHANGE UP (ref 10–20)
BUN SERPL-MCNC: 23 MG/DL — HIGH (ref 10–20)
CALCIUM SERPL-MCNC: 8.7 MG/DL — SIGNIFICANT CHANGE UP (ref 8.4–10.5)
CALCIUM SERPL-MCNC: 9.1 MG/DL — SIGNIFICANT CHANGE UP (ref 8.4–10.5)
CHLORIDE SERPL-SCNC: 101 MMOL/L — SIGNIFICANT CHANGE UP (ref 98–110)
CHLORIDE SERPL-SCNC: 98 MMOL/L — SIGNIFICANT CHANGE UP (ref 98–110)
CO2 SERPL-SCNC: 24 MMOL/L — SIGNIFICANT CHANGE UP (ref 17–32)
CO2 SERPL-SCNC: 26 MMOL/L — SIGNIFICANT CHANGE UP (ref 17–32)
CREAT SERPL-MCNC: 0.9 MG/DL — SIGNIFICANT CHANGE UP (ref 0.7–1.5)
CREAT SERPL-MCNC: 1.2 MG/DL — SIGNIFICANT CHANGE UP (ref 0.7–1.5)
EGFR: 48 ML/MIN/1.73M2 — LOW
EGFR: 68 ML/MIN/1.73M2 — SIGNIFICANT CHANGE UP
GLUCOSE BLDC GLUCOMTR-MCNC: 188 MG/DL — HIGH (ref 70–99)
GLUCOSE BLDC GLUCOMTR-MCNC: 191 MG/DL — HIGH (ref 70–99)
GLUCOSE BLDC GLUCOMTR-MCNC: 206 MG/DL — HIGH (ref 70–99)
GLUCOSE BLDC GLUCOMTR-MCNC: 212 MG/DL — HIGH (ref 70–99)
GLUCOSE SERPL-MCNC: 177 MG/DL — HIGH (ref 70–99)
GLUCOSE SERPL-MCNC: 190 MG/DL — HIGH (ref 70–99)
HCT VFR BLD CALC: 34 % — LOW (ref 37–47)
HCT VFR BLD CALC: 37.1 % — SIGNIFICANT CHANGE UP (ref 37–47)
HGB BLD-MCNC: 10.5 G/DL — LOW (ref 12–16)
HGB BLD-MCNC: 11.6 G/DL — LOW (ref 12–16)
LACTATE SERPL-SCNC: 1.5 MMOL/L — SIGNIFICANT CHANGE UP (ref 0.7–2)
MAGNESIUM SERPL-MCNC: 2.1 MG/DL — SIGNIFICANT CHANGE UP (ref 1.8–2.4)
MAGNESIUM SERPL-MCNC: 2.2 MG/DL — SIGNIFICANT CHANGE UP (ref 1.8–2.4)
MCHC RBC-ENTMCNC: 23.3 PG — LOW (ref 27–31)
MCHC RBC-ENTMCNC: 23.7 PG — LOW (ref 27–31)
MCHC RBC-ENTMCNC: 30.9 G/DL — LOW (ref 32–37)
MCHC RBC-ENTMCNC: 31.3 G/DL — LOW (ref 32–37)
MCV RBC AUTO: 75.4 FL — LOW (ref 81–99)
MCV RBC AUTO: 75.7 FL — LOW (ref 81–99)
NRBC # BLD: 0 /100 WBCS — SIGNIFICANT CHANGE UP (ref 0–0)
NRBC # BLD: 0 /100 WBCS — SIGNIFICANT CHANGE UP (ref 0–0)
PHOSPHATE SERPL-MCNC: 4.5 MG/DL — SIGNIFICANT CHANGE UP (ref 2.1–4.9)
PHOSPHATE SERPL-MCNC: 4.5 MG/DL — SIGNIFICANT CHANGE UP (ref 2.1–4.9)
PLATELET # BLD AUTO: 177 K/UL — SIGNIFICANT CHANGE UP (ref 130–400)
PLATELET # BLD AUTO: 264 K/UL — SIGNIFICANT CHANGE UP (ref 130–400)
PMV BLD: 10.4 FL — SIGNIFICANT CHANGE UP (ref 7.4–10.4)
PMV BLD: 9.9 FL — SIGNIFICANT CHANGE UP (ref 7.4–10.4)
POTASSIUM SERPL-MCNC: 4.1 MMOL/L — SIGNIFICANT CHANGE UP (ref 3.5–5)
POTASSIUM SERPL-MCNC: 4.5 MMOL/L — SIGNIFICANT CHANGE UP (ref 3.5–5)
POTASSIUM SERPL-SCNC: 4.1 MMOL/L — SIGNIFICANT CHANGE UP (ref 3.5–5)
POTASSIUM SERPL-SCNC: 4.5 MMOL/L — SIGNIFICANT CHANGE UP (ref 3.5–5)
RBC # BLD: 4.51 M/UL — SIGNIFICANT CHANGE UP (ref 4.2–5.4)
RBC # BLD: 4.9 M/UL — SIGNIFICANT CHANGE UP (ref 4.2–5.4)
RBC # FLD: 15.8 % — HIGH (ref 11.5–14.5)
RBC # FLD: 15.9 % — HIGH (ref 11.5–14.5)
SODIUM SERPL-SCNC: 135 MMOL/L — SIGNIFICANT CHANGE UP (ref 135–146)
SODIUM SERPL-SCNC: 137 MMOL/L — SIGNIFICANT CHANGE UP (ref 135–146)
WBC # BLD: 5.39 K/UL — SIGNIFICANT CHANGE UP (ref 4.8–10.8)
WBC # BLD: 6.76 K/UL — SIGNIFICANT CHANGE UP (ref 4.8–10.8)
WBC # FLD AUTO: 5.39 K/UL — SIGNIFICANT CHANGE UP (ref 4.8–10.8)
WBC # FLD AUTO: 6.76 K/UL — SIGNIFICANT CHANGE UP (ref 4.8–10.8)

## 2024-05-29 PROCEDURE — 99233 SBSQ HOSP IP/OBS HIGH 50: CPT

## 2024-05-29 PROCEDURE — 99232 SBSQ HOSP IP/OBS MODERATE 35: CPT

## 2024-05-29 RX ORDER — INSULIN LISPRO 100/ML
7 VIAL (ML) SUBCUTANEOUS
Refills: 0 | Status: DISCONTINUED | OUTPATIENT
Start: 2024-05-29 | End: 2024-05-29

## 2024-05-29 RX ORDER — INSULIN LISPRO 100/ML
4 VIAL (ML) SUBCUTANEOUS
Refills: 0 | Status: DISCONTINUED | OUTPATIENT
Start: 2024-05-29 | End: 2024-06-03

## 2024-05-29 RX ORDER — INSULIN LISPRO 100/ML
7 VIAL (ML) SUBCUTANEOUS ONCE
Refills: 0 | Status: COMPLETED | OUTPATIENT
Start: 2024-05-29 | End: 2024-05-29

## 2024-05-29 RX ORDER — INSULIN GLARGINE 100 [IU]/ML
10 INJECTION, SOLUTION SUBCUTANEOUS AT BEDTIME
Refills: 0 | Status: DISCONTINUED | OUTPATIENT
Start: 2024-05-29 | End: 2024-06-03

## 2024-05-29 RX ORDER — POLYETHYLENE GLYCOL 3350 17 G/17G
17 POWDER, FOR SOLUTION ORAL EVERY 12 HOURS
Refills: 0 | Status: DISCONTINUED | OUTPATIENT
Start: 2024-05-29 | End: 2024-06-03

## 2024-05-29 RX ORDER — CHLORHEXIDINE GLUCONATE 213 G/1000ML
1 SOLUTION TOPICAL
Refills: 0 | Status: DISCONTINUED | OUTPATIENT
Start: 2024-05-29 | End: 2024-06-03

## 2024-05-29 RX ADMIN — Medication 650 MILLIGRAM(S): at 11:29

## 2024-05-29 RX ADMIN — METHOCARBAMOL 500 MILLIGRAM(S): 500 TABLET, FILM COATED ORAL at 05:09

## 2024-05-29 RX ADMIN — METHOCARBAMOL 500 MILLIGRAM(S): 500 TABLET, FILM COATED ORAL at 17:11

## 2024-05-29 RX ADMIN — Medication 650 MILLIGRAM(S): at 05:09

## 2024-05-29 RX ADMIN — CEFTRIAXONE 100 MILLIGRAM(S): 500 INJECTION, POWDER, FOR SOLUTION INTRAMUSCULAR; INTRAVENOUS at 11:29

## 2024-05-29 RX ADMIN — Medication 5: at 17:14

## 2024-05-29 RX ADMIN — INSULIN GLARGINE 10 UNIT(S): 100 INJECTION, SOLUTION SUBCUTANEOUS at 21:54

## 2024-05-29 RX ADMIN — Medication 650 MILLIGRAM(S): at 17:11

## 2024-05-29 RX ADMIN — Medication 4 UNIT(S): at 17:14

## 2024-05-29 RX ADMIN — CHLORHEXIDINE GLUCONATE 1 APPLICATION(S): 213 SOLUTION TOPICAL at 11:31

## 2024-05-29 RX ADMIN — Medication 50 MILLIGRAM(S): at 17:11

## 2024-05-29 RX ADMIN — Medication 650 MILLIGRAM(S): at 00:16

## 2024-05-29 RX ADMIN — SENNA PLUS 2 TABLET(S): 8.6 TABLET ORAL at 21:53

## 2024-05-29 RX ADMIN — METHOCARBAMOL 500 MILLIGRAM(S): 500 TABLET, FILM COATED ORAL at 11:29

## 2024-05-29 RX ADMIN — CHLORHEXIDINE GLUCONATE 1 APPLICATION(S): 213 SOLUTION TOPICAL at 06:59

## 2024-05-29 RX ADMIN — GABAPENTIN 100 MILLIGRAM(S): 400 CAPSULE ORAL at 15:21

## 2024-05-29 RX ADMIN — ENOXAPARIN SODIUM 30 MILLIGRAM(S): 100 INJECTION SUBCUTANEOUS at 17:11

## 2024-05-29 RX ADMIN — Medication 650 MILLIGRAM(S): at 06:09

## 2024-05-29 RX ADMIN — GABAPENTIN 100 MILLIGRAM(S): 400 CAPSULE ORAL at 21:52

## 2024-05-29 RX ADMIN — Medication 7 UNIT(S): at 08:30

## 2024-05-29 RX ADMIN — GABAPENTIN 100 MILLIGRAM(S): 400 CAPSULE ORAL at 05:09

## 2024-05-29 RX ADMIN — ENOXAPARIN SODIUM 30 MILLIGRAM(S): 100 INJECTION SUBCUTANEOUS at 05:10

## 2024-05-29 RX ADMIN — Medication 7: at 08:20

## 2024-05-29 RX ADMIN — SIMVASTATIN 40 MILLIGRAM(S): 20 TABLET, FILM COATED ORAL at 21:52

## 2024-05-29 RX ADMIN — Medication 50 MILLIGRAM(S): at 05:09

## 2024-05-29 RX ADMIN — Medication 7 UNIT(S): at 11:30

## 2024-05-29 RX ADMIN — Medication 5: at 21:47

## 2024-05-29 RX ADMIN — Medication 5: at 11:30

## 2024-05-29 NOTE — CHART NOTE - NSCHARTNOTEFT_GEN_A_CORE
SICU Transfer Note    ALLEN SCHULZ  74y (1950)  [id number]      Transfer from: SICU  Transfer to: Surgery- Trauma-Acute Care      SICU COURSE:  SICU Consulted to hemodynamic monitoring status post fall on 5/28.  Patient was seen bedside in the ED. Patient is a/ox3, not in any acute distress. Patient reported right hip pain when moving but otherwise denies all pain when sitting still. Patient denies chest pain, dyspnea, anxiety,  abdominal pain, nausea, paresthesias. Patient during day was hypertensive up to 180's, amlodipine 5mg was added to her home med regimen. Pain well controlled. Breathing on nasal canula. Lactate downtrending. Patient was able to work with PT and Physiatry. Tolerating diet. Lantus and Lispro started. On 5/29 patient continue to following commands,         PAST MEDICAL & SURGICAL HISTORY:  DM (diabetes mellitus)  Hypercholesteremia  HTN (hypertension)  OA (osteoarthritis)  History of joint surgery  Right HIP ORIF  [facility]  History of tonsillectomy and adenoidectomy  1957      Allergies    No Known Allergies    Intolerances      MEDICATIONS  (STANDING):  acetaminophen     Tablet .. 650 milliGRAM(s) Oral every 6 hours  amLODIPine   Tablet 5 milliGRAM(s) Oral daily  cefTRIAXone   IVPB 1000 milliGRAM(s) IV Intermittent every 24 hours  chlorhexidine 2% Cloths 1 Application(s) Topical <User Schedule>  enoxaparin Injectable 30 milliGRAM(s) SubCutaneous every 12 hours  gabapentin 100 milliGRAM(s) Oral every 8 hours  hydrochlorothiazide 25 milliGRAM(s) Oral daily  insulin glargine Injectable (LANTUS) 10 Unit(s) SubCutaneous at bedtime  insulin lispro (ADMELOG) corrective regimen sliding scale   SubCutaneous Before meals and at bedtime  insulin lispro Injectable (ADMELOG) 7 Unit(s) SubCutaneous three times a day before meals  lidocaine   4% Patch 1 Patch Transdermal daily  methocarbamol 500 milliGRAM(s) Oral every 6 hours  metoprolol tartrate 50 milliGRAM(s) Oral every 12 hours  polyethylene glycol 3350 17 Gram(s) Oral every 12 hours  senna 2 Tablet(s) Oral at bedtime  simvastatin 40 milliGRAM(s) Oral at bedtime    MEDICATIONS  (PRN):      Vital Signs Last 24 Hrs  T(C): 36.2 (29 May 2024 08:00), Max: 37.1 (28 May 2024 14:12)  T(F): 97.2 (29 May 2024 08:00), Max: 98.7 (28 May 2024 14:12)  HR: 74 (29 May 2024 08:00) (70 - 86)  BP: 136/66 (29 May 2024 08:00) (121/58 - 167/72)  BP(mean): 95 (29 May 2024 08:00) (83 - 99)  RR: 27 (29 May 2024 08:00) (13 - 27)  SpO2: 92% (29 May 2024 08:00) (89% - 96%)    Parameters below as of 29 May 2024 04:00  Patient On (Oxygen Delivery Method): room air      I&O's Summary    28 May 2024 07:01  -  29 May 2024 07:00  --------------------------------------------------------  IN: 620 mL / OUT: 1285 mL / NET: -665 mL        LABS  LABS:                        10.5   5.39  )-----------( 177      ( 29 May 2024 00:39 )             34.0       05-29    137  |  101  |  19  ----------------------------<  190<H>  4.1   |  24  |  0.9    Ca    8.7      29 May 2024 00:39  Phos  4.5     05-29  Mg     2.2     05-29    TPro  7.0  /  Alb  4.1  /  TBili  0.3  /  DBili  x   /  AST  18  /  ALT  15  /  AlkPhos  121<H>  05-27      PT/INR - ( 27 May 2024 20:00 )   PT: 12.80 sec;   INR: 1.12 ratio         PTT - ( 27 May 2024 20:00 )  PTT:33.5 sec                    Culture - Blood (collected 28 May 2024 02:10)  Source: .Blood Blood  Preliminary Report (29 May 2024 09:02):    No growth at 24 hours    Culture - Blood (collected 28 May 2024 02:10)  Source: .Blood Blood  Preliminary Report (29 May 2024 09:02):    No growth at 24 hours    Urinalysis with Rflx Culture (collected 27 May 2024 22:39)              Assessment & Plan:          Follow Up:  -2330 labs        Signed out to:  Date:  Time: SICU Transfer Note    ALLEN SCHULZ  74y (1950)  [id number]      Transfer from: SICU  Transfer to: Surgery- Trauma-Acute Care        SICU COURSE:  SICU Consulted to hemodynamic monitoring status post fall on 5/28.  Patient was seen bedside in the ED. Patient is a/ox3, not in any acute distress. Patient reported right hip pain when moving but otherwise denies all pain when sitting still. Patient denies chest pain, dyspnea, anxiety,  abdominal pain, nausea, paresthesias. Patient during day was hypertensive up to 180's, amlodipine 5mg was added to her home med regimen. Pain well controlled. Breathing on nasal canula. Lactate downtrending. Patient was able to work with PT and Physiatry. Tolerating diet. Lantus and Lispro started. On 5/29 patient continue to following commands, IS 2000, -160's, tolerating regular diet, passing gas no bowel movement. Wei catheter was discontinued, pending TOV. Afebrile on Ceftriaxone due to UTI. Patient will be downgraded to floor.       Injuries identified:   - right comminuted pubic rami fx  - right medial acetabular wall fx  - inferior right iliac fx  - periprosthetic femoral shaft fx    - age indeterminate right 4-6 rib fxs. patient nontender on exam    PAST MEDICAL & SURGICAL HISTORY:  DM (diabetes mellitus)  Hypercholesteremia  HTN (hypertension)  OA (osteoarthritis)  History of joint surgery  Right HIP ORIF  [facility]  History of tonsillectomy and adenoidectomy  1957      Allergies    No Known Allergies    Intolerances      MEDICATIONS  (STANDING):  acetaminophen     Tablet .. 650 milliGRAM(s) Oral every 6 hours  amLODIPine   Tablet 5 milliGRAM(s) Oral daily  cefTRIAXone   IVPB 1000 milliGRAM(s) IV Intermittent every 24 hours  chlorhexidine 2% Cloths 1 Application(s) Topical <User Schedule>  enoxaparin Injectable 30 milliGRAM(s) SubCutaneous every 12 hours  gabapentin 100 milliGRAM(s) Oral every 8 hours  hydrochlorothiazide 25 milliGRAM(s) Oral daily  insulin glargine Injectable (LANTUS) 10 Unit(s) SubCutaneous at bedtime  insulin lispro (ADMELOG) corrective regimen sliding scale   SubCutaneous Before meals and at bedtime  insulin lispro Injectable (ADMELOG) 7 Unit(s) SubCutaneous three times a day before meals  lidocaine   4% Patch 1 Patch Transdermal daily  methocarbamol 500 milliGRAM(s) Oral every 6 hours  metoprolol tartrate 50 milliGRAM(s) Oral every 12 hours  polyethylene glycol 3350 17 Gram(s) Oral every 12 hours  senna 2 Tablet(s) Oral at bedtime  simvastatin 40 milliGRAM(s) Oral at bedtime    MEDICATIONS  (PRN):      Vital Signs Last 24 Hrs  T(C): 36.2 (29 May 2024 08:00), Max: 37.1 (28 May 2024 14:12)  T(F): 97.2 (29 May 2024 08:00), Max: 98.7 (28 May 2024 14:12)  HR: 74 (29 May 2024 08:00) (70 - 86)  BP: 136/66 (29 May 2024 08:00) (121/58 - 167/72)  BP(mean): 95 (29 May 2024 08:00) (83 - 99)  RR: 27 (29 May 2024 08:00) (13 - 27)  SpO2: 92% (29 May 2024 08:00) (89% - 96%)    Parameters below as of 29 May 2024 04:00  Patient On (Oxygen Delivery Method): room air      I&O's Summary    28 May 2024 07:01  -  29 May 2024 07:00  --------------------------------------------------------  IN: 620 mL / OUT: 1285 mL / NET: -665 mL        LABS  LABS:                        10.5   5.39  )-----------( 177      ( 29 May 2024 00:39 )             34.0       05-29    137  |  101  |  19  ----------------------------<  190<H>  4.1   |  24  |  0.9    Ca    8.7      29 May 2024 00:39  Phos  4.5     05-29  Mg     2.2     05-29    TPro  7.0  /  Alb  4.1  /  TBili  0.3  /  DBili  x   /  AST  18  /  ALT  15  /  AlkPhos  121<H>  05-27      PT/INR - ( 27 May 2024 20:00 )   PT: 12.80 sec;   INR: 1.12 ratio         PTT - ( 27 May 2024 20:00 )  PTT:33.5 sec                    Culture - Blood (collected 28 May 2024 02:10)  Source: .Blood Blood  Preliminary Report (29 May 2024 09:02):    No growth at 24 hours    Culture - Blood (collected 28 May 2024 02:10)  Source: .Blood Blood  Preliminary Report (29 May 2024 09:02):    No growth at 24 hours    Urinalysis with Rflx Culture (collected 27 May 2024 22:39)          Assessment & Plan:  74yF w/ PMHx of HTN, HLD, DM, OA, right hip surgery seen as a trauma transfer from [facility] s/p mechanical fall today -HT, -LOC, -AC.   external signs of trauma include right externally rotated hip . Trauma assessment in ED: ABCs intact , GCS 15 , AAOx3,  FLORES.       PLAN:   -Downgrade to the floor approved by Dr. Ennis.   -Multimodal pain control (Tylenol, gabapentin, Robaxin, Lidocaine patch)  -IS 10 times every hour while awake. Currently 2000  -Monitor vitals  -Re-start home meds (Hctz, Metoprolol) added Amlodipine  -Diet: DASH/Carb, tolerating.   -: Eriberto leiva 5/29 - Pending TOV 6 pm  -ID: UA + on admission. Ceftriaxone for 3 days.  -Endo: HgbA1c 8.4. Added Lantus 10U, and Lispro preprandial 7U  -Ortho recs: non op management trial, toe touch weight bearing on right, repeat xray in 2 weeks. Aspirin 81mg BID for 6 weeks for discharge. F/U with Dr Dena ramos   -PT/OT recs: Acute inpatient rehab. Physiatry: possible candidate for intensive rehab       Follow Up:  -2000 labs  -TOV 6 PM  -POCT blood glucose, if needed may increase Lispro to 10 U          Signed out to:  Date: 5/29/2024  Time: SICU Transfer Note    ALLEN SCHULZ  74y (1950)  [id number]      Transfer from: SICU  Transfer to: Surgery- Trauma-Acute Care        SICU COURSE:  SICU Consulted to hemodynamic monitoring status post fall on 5/28.  Patient was seen bedside in the ED. Patient is a/ox3, not in any acute distress. Patient reported right hip pain when moving but otherwise denies all pain when sitting still. Patient denies chest pain, dyspnea, anxiety,  abdominal pain, nausea, paresthesias. Patient during day was hypertensive up to 180's, amlodipine 5mg was added to her home med regimen. Pain well controlled. Breathing on nasal canula. Lactate downtrending. Patient was able to work with PT and Physiatry. Tolerating diet. Lantus and Lispro started. On 5/29 patient continue to following commands, IS 2000, -160's, tolerating regular diet, passing gas no bowel movement. Wei catheter was discontinued, pending TOV. Afebrile on Ceftriaxone due to UTI. Patient will be downgraded to floor.       Injuries identified:   - right comminuted pubic rami fx  - right medial acetabular wall fx  - inferior right iliac fx  - periprosthetic femoral shaft fx    - age indeterminate right 4-6 rib fxs. patient nontender on exam    PAST MEDICAL & SURGICAL HISTORY:  DM (diabetes mellitus)  Hypercholesteremia  HTN (hypertension)  OA (osteoarthritis)  History of joint surgery  Right HIP ORIF  [facility]  History of tonsillectomy and adenoidectomy  1957      Allergies    No Known Allergies    Intolerances      MEDICATIONS  (STANDING):  acetaminophen     Tablet .. 650 milliGRAM(s) Oral every 6 hours  amLODIPine   Tablet 5 milliGRAM(s) Oral daily  cefTRIAXone   IVPB 1000 milliGRAM(s) IV Intermittent every 24 hours  chlorhexidine 2% Cloths 1 Application(s) Topical <User Schedule>  enoxaparin Injectable 30 milliGRAM(s) SubCutaneous every 12 hours  gabapentin 100 milliGRAM(s) Oral every 8 hours  hydrochlorothiazide 25 milliGRAM(s) Oral daily  insulin glargine Injectable (LANTUS) 10 Unit(s) SubCutaneous at bedtime  insulin lispro (ADMELOG) corrective regimen sliding scale   SubCutaneous Before meals and at bedtime  insulin lispro Injectable (ADMELOG) 7 Unit(s) SubCutaneous three times a day before meals  lidocaine   4% Patch 1 Patch Transdermal daily  methocarbamol 500 milliGRAM(s) Oral every 6 hours  metoprolol tartrate 50 milliGRAM(s) Oral every 12 hours  polyethylene glycol 3350 17 Gram(s) Oral every 12 hours  senna 2 Tablet(s) Oral at bedtime  simvastatin 40 milliGRAM(s) Oral at bedtime    MEDICATIONS  (PRN):      Vital Signs Last 24 Hrs  T(C): 36.2 (29 May 2024 08:00), Max: 37.1 (28 May 2024 14:12)  T(F): 97.2 (29 May 2024 08:00), Max: 98.7 (28 May 2024 14:12)  HR: 74 (29 May 2024 08:00) (70 - 86)  BP: 136/66 (29 May 2024 08:00) (121/58 - 167/72)  BP(mean): 95 (29 May 2024 08:00) (83 - 99)  RR: 27 (29 May 2024 08:00) (13 - 27)  SpO2: 92% (29 May 2024 08:00) (89% - 96%)    Parameters below as of 29 May 2024 04:00  Patient On (Oxygen Delivery Method): room air      I&O's Summary    28 May 2024 07:01  -  29 May 2024 07:00  --------------------------------------------------------  IN: 620 mL / OUT: 1285 mL / NET: -665 mL        LABS  LABS:                        10.5   5.39  )-----------( 177      ( 29 May 2024 00:39 )             34.0       05-29    137  |  101  |  19  ----------------------------<  190<H>  4.1   |  24  |  0.9    Ca    8.7      29 May 2024 00:39  Phos  4.5     05-29  Mg     2.2     05-29    TPro  7.0  /  Alb  4.1  /  TBili  0.3  /  DBili  x   /  AST  18  /  ALT  15  /  AlkPhos  121<H>  05-27      PT/INR - ( 27 May 2024 20:00 )   PT: 12.80 sec;   INR: 1.12 ratio         PTT - ( 27 May 2024 20:00 )  PTT:33.5 sec                    Culture - Blood (collected 28 May 2024 02:10)  Source: .Blood Blood  Preliminary Report (29 May 2024 09:02):    No growth at 24 hours    Culture - Blood (collected 28 May 2024 02:10)  Source: .Blood Blood  Preliminary Report (29 May 2024 09:02):    No growth at 24 hours    Urinalysis with Rflx Culture (collected 27 May 2024 22:39)          Assessment & Plan:  74yF w/ PMHx of HTN, HLD, DM, OA, right hip surgery seen as a trauma transfer from [facility] s/p mechanical fall today -HT, -LOC, -AC.   external signs of trauma include right externally rotated hip . Trauma assessment in ED: ABCs intact , GCS 15 , AAOx3,  FLORES.       PLAN:   -Downgrade to the floor approved by Dr. Ennis.   -Multimodal pain control (Tylenol, gabapentin, Robaxin, Lidocaine patch)  -IS 10 times every hour while awake. Currently 2000  -Monitor vitals  -Re-start home meds (Hctz, Metoprolol) added Amlodipine  -Diet: DASH/Carb, tolerating.   -: Eriberto leiva 5/29 - Pending TOV 6 pm  -ID: UA + on admission. Ceftriaxone for 3 days.  -Endo: HgbA1c 8.4. Added Lantus 10U, and Lispro preprandial 7U  -Ortho recs: non op management trial, toe touch weight bearing on right, repeat xray in 2 weeks. Aspirin 81mg BID for 6 weeks for discharge. F/U with Dr Dena ramos   -PT/OT recs: Acute inpatient rehab. Physiatry: possible candidate for intensive rehab       Follow Up:  -2000 labs  -TOV 6 PM  -POCT blood glucose, if needed may increase Lispro to 10 U          Signed out to: Dr. Abreu  Date: 5/29/2024  Time: 12h54 pm

## 2024-05-29 NOTE — PROGRESS NOTE ADULT - ATTENDING COMMENTS
Patient is AAO x3  Pain is controlled.  Is OOB on chair.  Urine in Wei looks clear.  Had elevated serum Glu - started on Lantus 10u and Lispro 7u preprandial.    ASSESSMENT:  73 y/o female, S/P Fall.  Right Inferior Pubic Rami Fracture.  Right Periprosthetic Femur Shaft Fracture.  Right Acetabular Fracture.  Acute pain due to trauma.  UTI present on admission.    PLAN:  - pain control - on Tylenol, Gabapentin  - encourage incentive spirometer  - keep normotensive  - follow serum electrolytes and UOP  - ID - on Rocephin for UTI on admission.  - DVT prophylaxis  - Ortho f/u  - PT eval  SDU care

## 2024-05-29 NOTE — OCCUPATIONAL THERAPY INITIAL EVALUATION ADULT - GENERAL OBSERVATIONS, REHAB EVAL
pt seen bedside in NAD @ 9:20-9:49AM, + O2 NC, + tele, +randy, pt is cooperative and motivated to participate w/OT

## 2024-05-29 NOTE — OCCUPATIONAL THERAPY INITIAL EVALUATION ADULT - PERTINENT HX OF CURRENT PROBLEM, REHAB EVAL
74yF w/ PMHx of HTN, HLD, DM, OA, right hip surgery seen as a trauma transfer from [facility] s/p mechanical fall today -HT, -LOC, -AC.  Patient was entering her friend's home from the garage when she tripped over 1 step falling on to her right side. She was not able to get up and walk without great amount of pain. Denies pain to anywhere else. Denies chest pain, SOB, fever or chills, abdominal pain or difficulty urinating. Trauma assessment in ED: ABCs intact , GCS 15 , AAOx3.

## 2024-05-30 DIAGNOSIS — S22.39XA FRACTURE OF ONE RIB, UNSPECIFIED SIDE, INITIAL ENCOUNTER FOR CLOSED FRACTURE: ICD-10-CM

## 2024-05-30 DIAGNOSIS — E11.9 TYPE 2 DIABETES MELLITUS WITHOUT COMPLICATIONS: ICD-10-CM

## 2024-05-30 DIAGNOSIS — D64.9 ANEMIA, UNSPECIFIED: ICD-10-CM

## 2024-05-30 DIAGNOSIS — E04.1 NONTOXIC SINGLE THYROID NODULE: ICD-10-CM

## 2024-05-30 DIAGNOSIS — S32.9XXA FRACTURE OF UNSPECIFIED PARTS OF LUMBOSACRAL SPINE AND PELVIS, INITIAL ENCOUNTER FOR CLOSED FRACTURE: ICD-10-CM

## 2024-05-30 DIAGNOSIS — N30.00 ACUTE CYSTITIS WITHOUT HEMATURIA: ICD-10-CM

## 2024-05-30 DIAGNOSIS — I10 ESSENTIAL (PRIMARY) HYPERTENSION: ICD-10-CM

## 2024-05-30 LAB
ANION GAP SERPL CALC-SCNC: 12 MMOL/L — SIGNIFICANT CHANGE UP (ref 7–14)
ANION GAP SERPL CALC-SCNC: 12 MMOL/L — SIGNIFICANT CHANGE UP (ref 7–14)
BASOPHILS # BLD AUTO: 0.05 K/UL — SIGNIFICANT CHANGE UP (ref 0–0.2)
BASOPHILS NFR BLD AUTO: 0.9 % — SIGNIFICANT CHANGE UP (ref 0–1)
BUN SERPL-MCNC: 22 MG/DL — HIGH (ref 10–20)
BUN SERPL-MCNC: 22 MG/DL — HIGH (ref 10–20)
CALCIUM SERPL-MCNC: 8.8 MG/DL — SIGNIFICANT CHANGE UP (ref 8.4–10.5)
CALCIUM SERPL-MCNC: 9 MG/DL — SIGNIFICANT CHANGE UP (ref 8.4–10.4)
CHLORIDE SERPL-SCNC: 95 MMOL/L — LOW (ref 98–110)
CHLORIDE SERPL-SCNC: 95 MMOL/L — LOW (ref 98–110)
CO2 SERPL-SCNC: 26 MMOL/L — SIGNIFICANT CHANGE UP (ref 17–32)
CO2 SERPL-SCNC: 28 MMOL/L — SIGNIFICANT CHANGE UP (ref 17–32)
CREAT SERPL-MCNC: 0.9 MG/DL — SIGNIFICANT CHANGE UP (ref 0.7–1.5)
CREAT SERPL-MCNC: 0.9 MG/DL — SIGNIFICANT CHANGE UP (ref 0.7–1.5)
EGFR: 68 ML/MIN/1.73M2 — SIGNIFICANT CHANGE UP
EGFR: 68 ML/MIN/1.73M2 — SIGNIFICANT CHANGE UP
EOSINOPHIL # BLD AUTO: 0.26 K/UL — SIGNIFICANT CHANGE UP (ref 0–0.7)
EOSINOPHIL NFR BLD AUTO: 4.6 % — SIGNIFICANT CHANGE UP (ref 0–8)
GLUCOSE BLDC GLUCOMTR-MCNC: 194 MG/DL — HIGH (ref 70–99)
GLUCOSE BLDC GLUCOMTR-MCNC: 199 MG/DL — HIGH (ref 70–99)
GLUCOSE BLDC GLUCOMTR-MCNC: 206 MG/DL — HIGH (ref 70–99)
GLUCOSE BLDC GLUCOMTR-MCNC: 271 MG/DL — HIGH (ref 70–99)
GLUCOSE SERPL-MCNC: 181 MG/DL — HIGH (ref 70–99)
GLUCOSE SERPL-MCNC: 259 MG/DL — HIGH (ref 70–99)
HCT VFR BLD CALC: 32.8 % — LOW (ref 37–47)
HGB BLD-MCNC: 10.5 G/DL — LOW (ref 12–16)
IMM GRANULOCYTES NFR BLD AUTO: 0.2 % — SIGNIFICANT CHANGE UP (ref 0.1–0.3)
LYMPHOCYTES # BLD AUTO: 1.94 K/UL — SIGNIFICANT CHANGE UP (ref 1.2–3.4)
LYMPHOCYTES # BLD AUTO: 34.5 % — SIGNIFICANT CHANGE UP (ref 20.5–51.1)
MAGNESIUM SERPL-MCNC: 2 MG/DL — SIGNIFICANT CHANGE UP (ref 1.8–2.4)
MCHC RBC-ENTMCNC: 23.8 PG — LOW (ref 27–31)
MCHC RBC-ENTMCNC: 32 G/DL — SIGNIFICANT CHANGE UP (ref 32–37)
MCV RBC AUTO: 74.4 FL — LOW (ref 81–99)
MONOCYTES # BLD AUTO: 0.5 K/UL — SIGNIFICANT CHANGE UP (ref 0.1–0.6)
MONOCYTES NFR BLD AUTO: 8.9 % — SIGNIFICANT CHANGE UP (ref 1.7–9.3)
NEUTROPHILS # BLD AUTO: 2.87 K/UL — SIGNIFICANT CHANGE UP (ref 1.4–6.5)
NEUTROPHILS NFR BLD AUTO: 50.9 % — SIGNIFICANT CHANGE UP (ref 42.2–75.2)
NRBC # BLD: 0 /100 WBCS — SIGNIFICANT CHANGE UP (ref 0–0)
PHOSPHATE SERPL-MCNC: 4.7 MG/DL — SIGNIFICANT CHANGE UP (ref 2.1–4.9)
PLATELET # BLD AUTO: 247 K/UL — SIGNIFICANT CHANGE UP (ref 130–400)
PMV BLD: 10.5 FL — HIGH (ref 7.4–10.4)
POTASSIUM SERPL-MCNC: 4.3 MMOL/L — SIGNIFICANT CHANGE UP (ref 3.5–5)
POTASSIUM SERPL-MCNC: 5.2 MMOL/L — HIGH (ref 3.5–5)
POTASSIUM SERPL-SCNC: 4.3 MMOL/L — SIGNIFICANT CHANGE UP (ref 3.5–5)
POTASSIUM SERPL-SCNC: 5.2 MMOL/L — HIGH (ref 3.5–5)
RBC # BLD: 4.41 M/UL — SIGNIFICANT CHANGE UP (ref 4.2–5.4)
RBC # FLD: 15.1 % — HIGH (ref 11.5–14.5)
SODIUM SERPL-SCNC: 133 MMOL/L — LOW (ref 135–146)
SODIUM SERPL-SCNC: 135 MMOL/L — SIGNIFICANT CHANGE UP (ref 135–146)
WBC # BLD: 5.63 K/UL — SIGNIFICANT CHANGE UP (ref 4.8–10.8)
WBC # FLD AUTO: 5.63 K/UL — SIGNIFICANT CHANGE UP (ref 4.8–10.8)

## 2024-05-30 PROCEDURE — 99232 SBSQ HOSP IP/OBS MODERATE 35: CPT

## 2024-05-30 RX ORDER — SODIUM CHLORIDE 9 MG/ML
500 INJECTION, SOLUTION INTRAVENOUS ONCE
Refills: 0 | Status: COMPLETED | OUTPATIENT
Start: 2024-05-30 | End: 2024-05-30

## 2024-05-30 RX ADMIN — ENOXAPARIN SODIUM 30 MILLIGRAM(S): 100 INJECTION SUBCUTANEOUS at 17:25

## 2024-05-30 RX ADMIN — Medication 650 MILLIGRAM(S): at 05:40

## 2024-05-30 RX ADMIN — Medication 650 MILLIGRAM(S): at 11:49

## 2024-05-30 RX ADMIN — METHOCARBAMOL 500 MILLIGRAM(S): 500 TABLET, FILM COATED ORAL at 05:40

## 2024-05-30 RX ADMIN — Medication 50 MILLIGRAM(S): at 05:41

## 2024-05-30 RX ADMIN — Medication 4 UNIT(S): at 11:57

## 2024-05-30 RX ADMIN — Medication 650 MILLIGRAM(S): at 11:54

## 2024-05-30 RX ADMIN — SODIUM CHLORIDE 500 MILLILITER(S): 9 INJECTION, SOLUTION INTRAVENOUS at 06:24

## 2024-05-30 RX ADMIN — Medication 4 UNIT(S): at 17:47

## 2024-05-30 RX ADMIN — METHOCARBAMOL 500 MILLIGRAM(S): 500 TABLET, FILM COATED ORAL at 17:17

## 2024-05-30 RX ADMIN — CEFTRIAXONE 100 MILLIGRAM(S): 500 INJECTION, POWDER, FOR SOLUTION INTRAMUSCULAR; INTRAVENOUS at 11:49

## 2024-05-30 RX ADMIN — Medication 4 UNIT(S): at 08:09

## 2024-05-30 RX ADMIN — ENOXAPARIN SODIUM 30 MILLIGRAM(S): 100 INJECTION SUBCUTANEOUS at 05:40

## 2024-05-30 RX ADMIN — Medication 11: at 11:57

## 2024-05-30 RX ADMIN — Medication 650 MILLIGRAM(S): at 17:27

## 2024-05-30 RX ADMIN — METHOCARBAMOL 500 MILLIGRAM(S): 500 TABLET, FILM COATED ORAL at 11:50

## 2024-05-30 RX ADMIN — AMLODIPINE BESYLATE 5 MILLIGRAM(S): 2.5 TABLET ORAL at 05:41

## 2024-05-30 RX ADMIN — GABAPENTIN 100 MILLIGRAM(S): 400 CAPSULE ORAL at 13:51

## 2024-05-30 RX ADMIN — Medication 650 MILLIGRAM(S): at 17:19

## 2024-05-30 RX ADMIN — CHLORHEXIDINE GLUCONATE 1 APPLICATION(S): 213 SOLUTION TOPICAL at 05:39

## 2024-05-30 RX ADMIN — SIMVASTATIN 40 MILLIGRAM(S): 20 TABLET, FILM COATED ORAL at 22:28

## 2024-05-30 RX ADMIN — Medication 5: at 08:09

## 2024-05-30 RX ADMIN — Medication 5: at 22:27

## 2024-05-30 RX ADMIN — GABAPENTIN 100 MILLIGRAM(S): 400 CAPSULE ORAL at 05:41

## 2024-05-30 RX ADMIN — Medication 50 MILLIGRAM(S): at 17:19

## 2024-05-30 RX ADMIN — POLYETHYLENE GLYCOL 3350 17 GRAM(S): 17 POWDER, FOR SOLUTION ORAL at 17:16

## 2024-05-30 RX ADMIN — Medication 5: at 17:47

## 2024-05-30 RX ADMIN — INSULIN GLARGINE 10 UNIT(S): 100 INJECTION, SOLUTION SUBCUTANEOUS at 22:27

## 2024-05-30 RX ADMIN — SENNA PLUS 2 TABLET(S): 8.6 TABLET ORAL at 22:28

## 2024-05-30 RX ADMIN — GABAPENTIN 100 MILLIGRAM(S): 400 CAPSULE ORAL at 22:28

## 2024-05-30 NOTE — PROGRESS NOTE ADULT - ATTENDING COMMENTS
Trauma/Acute Care Surgery Attending Note Attestation    Patient was seen and examined bedside.  I reviewed the resident/PA note and agreed with above assessment and plan with following additions and corrections.    No acute events overnight. Transferred from SICU. Reports she does not have any significant pain. Eating without issue. No nausea, vomiting, chest pain, SOB, fevers, chills.    T(C): 37.1 (05-30-24 @ 17:01), Max: 37.1 (05-30-24 @ 17:01)  HR: 75 (05-30-24 @ 17:27) (74 - 75)  BP: 153/64 (05-30-24 @ 17:27) (126/67 - 170/74)  RR: 18 (05-30-24 @ 17:27) (18 - 19)  SpO2: 98% (05-30-24 @ 17:01) (98% - 98%)      05-29-24 @ 07:01  -  05-30-24 @ 07:00  --------------------------------------------------------  IN:    IV PiggyBack: 100 mL  Total IN: 100 mL    OUT:    Indwelling Catheter - Urethral (mL): 250 mL    Voided (mL): 300 mL  Total OUT: 550 mL    Total NET: -450 mL    I independently performed a medically appropriate exam. The exam was notable for soft, not tender, not distended abdomen. No pelvic pain on palpation.                          11.6   6.76  )-----------( 264      ( 30 May 2024 20:43 )             37.1     05-30    135  |  98  |  23<H>  ----------------------------<  259<H>  4.5   |  26  |  1.2    Ca 9.1; Mg 2.1 ; Phos 4.5      Assessment/Plan:  74y Female s/p PMH of HTN, HLD, DM, OA s/p mechanical fall down 1 step with injuries as detailed above  - Right comminuted pubic rami fracture, Right medial acetabular wall fracture, Inferior right iliac fracture, Periprosthetic femoral shaft fracture - orthopedics was consulted, no acute intervention, TTWB RLE with walker, 6 weeks of ASA-81 mg BID as outpatient  - Acute kidney injury - creatinine increase of 0.3, give 500mL IVF bolus and re-check BMP, encouraged oral hydration  - Diabetes - 10 units lantus QHS, 4 units lispro TID with meals, SSI  - HTN - home HCTZ/metoprolol, added amlodipine 5mg daily (can uptitrate as needed)  - HLD - simvastatin 40mg QHS  - Posttraumatic pain - multimodal pain therapy  - Constipation - continue senna and miralax  - Thyroid nodules - outpatient thyroid ultrasound and PCP follow up  - DVT ppx  - Physical deconditioning - PT evaluation, recommending acute rehab, awaiting placement    Mike Park MD  Trauma/Acute Care Surgery/Surgical Critical Care Attending Trauma/Acute Care Surgery Attending Note Attestation    Patient was seen and examined bedside.  I reviewed the resident/PA note and agreed with above assessment and plan with following additions and corrections.    No acute events overnight. Transferred from SICU. Reports she does not have any significant pain. Eating without issue. No nausea, vomiting, chest pain, SOB, fevers, chills.    T(C): 37.1 (05-30-24 @ 17:01), Max: 37.1 (05-30-24 @ 17:01)  HR: 75 (05-30-24 @ 17:27) (74 - 75)  BP: 153/64 (05-30-24 @ 17:27) (126/67 - 170/74)  RR: 18 (05-30-24 @ 17:27) (18 - 19)  SpO2: 98% (05-30-24 @ 17:01) (98% - 98%)      05-29-24 @ 07:01  -  05-30-24 @ 07:00  --------------------------------------------------------  IN:    IV PiggyBack: 100 mL  Total IN: 100 mL    OUT:    Indwelling Catheter - Urethral (mL): 250 mL    Voided (mL): 300 mL  Total OUT: 550 mL    Total NET: -450 mL    I independently performed a medically appropriate exam. The exam was notable for soft, not tender, not distended abdomen. No pelvic pain on palpation.                          11.6   6.76  )-----------( 264      ( 29 May 2024 20:00 )             37.1     05-30    135  |  98  |  23<H>  ----------------------------<  259<H>  4.5   |  26  |  1.2    Ca 9.1; Mg 2.1 ; Phos 4.5      Assessment/Plan:  74y Female s/p PMH of HTN, HLD, DM, OA s/p mechanical fall down 1 step with injuries as detailed above  - Right comminuted pubic rami fracture, Right medial acetabular wall fracture, Inferior right iliac fracture, Periprosthetic femoral shaft fracture - orthopedics was consulted, no acute intervention, TTWB RLE with walker, 6 weeks of ASA-81 mg BID as outpatient  - Acute kidney injury - creatinine increase of 0.3, give 500mL IVF bolus and re-check BMP, encouraged oral hydration  - Diabetes - 10 units lantus QHS, 4 units lispro TID with meals, SSI  - HTN - home HCTZ/metoprolol, added amlodipine 5mg daily (can uptitrate as needed)  - HLD - simvastatin 40mg QHS  - Posttraumatic pain - multimodal pain therapy  - Constipation - continue senna and miralax  - Thyroid nodules - outpatient thyroid ultrasound and PCP follow up  - DVT ppx  - Physical deconditioning - PT evaluation, recommending acute rehab, awaiting placement    Mike Park MD  Trauma/Acute Care Surgery/Surgical Critical Care Attending

## 2024-05-31 LAB
GLUCOSE BLDC GLUCOMTR-MCNC: 193 MG/DL — HIGH (ref 70–99)
GLUCOSE BLDC GLUCOMTR-MCNC: 210 MG/DL — HIGH (ref 70–99)
GLUCOSE BLDC GLUCOMTR-MCNC: 211 MG/DL — HIGH (ref 70–99)
GLUCOSE BLDC GLUCOMTR-MCNC: 225 MG/DL — HIGH (ref 70–99)
GLUCOSE BLDC GLUCOMTR-MCNC: 258 MG/DL — HIGH (ref 70–99)

## 2024-05-31 PROCEDURE — 99232 SBSQ HOSP IP/OBS MODERATE 35: CPT

## 2024-05-31 RX ORDER — METFORMIN HYDROCHLORIDE 850 MG/1
1000 TABLET ORAL
Refills: 0 | Status: DISCONTINUED | OUTPATIENT
Start: 2024-05-31 | End: 2024-06-03

## 2024-05-31 RX ORDER — INSULIN LISPRO 100/ML
VIAL (ML) SUBCUTANEOUS
Refills: 0 | Status: DISCONTINUED | OUTPATIENT
Start: 2024-05-31 | End: 2024-06-03

## 2024-05-31 RX ADMIN — Medication 4 UNIT(S): at 11:49

## 2024-05-31 RX ADMIN — METHOCARBAMOL 500 MILLIGRAM(S): 500 TABLET, FILM COATED ORAL at 11:50

## 2024-05-31 RX ADMIN — POLYETHYLENE GLYCOL 3350 17 GRAM(S): 17 POWDER, FOR SOLUTION ORAL at 18:21

## 2024-05-31 RX ADMIN — Medication 650 MILLIGRAM(S): at 06:52

## 2024-05-31 RX ADMIN — METHOCARBAMOL 500 MILLIGRAM(S): 500 TABLET, FILM COATED ORAL at 06:56

## 2024-05-31 RX ADMIN — Medication 4 UNIT(S): at 17:35

## 2024-05-31 RX ADMIN — Medication 650 MILLIGRAM(S): at 00:00

## 2024-05-31 RX ADMIN — METHOCARBAMOL 500 MILLIGRAM(S): 500 TABLET, FILM COATED ORAL at 18:20

## 2024-05-31 RX ADMIN — METFORMIN HYDROCHLORIDE 1000 MILLIGRAM(S): 850 TABLET ORAL at 18:20

## 2024-05-31 RX ADMIN — Medication 4 UNIT(S): at 08:28

## 2024-05-31 RX ADMIN — Medication 650 MILLIGRAM(S): at 11:50

## 2024-05-31 RX ADMIN — GABAPENTIN 100 MILLIGRAM(S): 400 CAPSULE ORAL at 21:34

## 2024-05-31 RX ADMIN — ENOXAPARIN SODIUM 30 MILLIGRAM(S): 100 INJECTION SUBCUTANEOUS at 06:56

## 2024-05-31 RX ADMIN — METHOCARBAMOL 500 MILLIGRAM(S): 500 TABLET, FILM COATED ORAL at 00:00

## 2024-05-31 RX ADMIN — Medication 50 MILLIGRAM(S): at 17:09

## 2024-05-31 RX ADMIN — Medication 6: at 17:35

## 2024-05-31 RX ADMIN — CHLORHEXIDINE GLUCONATE 1 APPLICATION(S): 213 SOLUTION TOPICAL at 06:59

## 2024-05-31 RX ADMIN — SIMVASTATIN 40 MILLIGRAM(S): 20 TABLET, FILM COATED ORAL at 21:34

## 2024-05-31 RX ADMIN — Medication 9: at 08:27

## 2024-05-31 RX ADMIN — AMLODIPINE BESYLATE 5 MILLIGRAM(S): 2.5 TABLET ORAL at 06:56

## 2024-05-31 RX ADMIN — Medication 650 MILLIGRAM(S): at 06:55

## 2024-05-31 RX ADMIN — INSULIN GLARGINE 10 UNIT(S): 100 INJECTION, SOLUTION SUBCUTANEOUS at 21:35

## 2024-05-31 RX ADMIN — Medication 650 MILLIGRAM(S): at 18:21

## 2024-05-31 RX ADMIN — ENOXAPARIN SODIUM 30 MILLIGRAM(S): 100 INJECTION SUBCUTANEOUS at 18:21

## 2024-05-31 RX ADMIN — GABAPENTIN 100 MILLIGRAM(S): 400 CAPSULE ORAL at 16:13

## 2024-05-31 RX ADMIN — Medication 6: at 21:35

## 2024-05-31 RX ADMIN — Medication 50 MILLIGRAM(S): at 06:55

## 2024-05-31 RX ADMIN — GABAPENTIN 100 MILLIGRAM(S): 400 CAPSULE ORAL at 06:55

## 2024-05-31 RX ADMIN — Medication 8: at 11:49

## 2024-05-31 NOTE — PROGRESS NOTE ADULT - ATTENDING COMMENTS
Trauma/Acute Care Surgery Attending Note Attestation    Patient was seen and examined bedside.  I reviewed the resident/PA note and agreed with above assessment and plan with following additions and corrections.    T(C): 37 (05-31-24 @ 16:11), Max: 37 (05-31-24 @ 16:11)  HR: 72 (05-31-24 @ 16:11) (72 - 74)  BP: 134/52 (05-31-24 @ 16:11) (130/66 - 173/70)  RR: 18 (05-31-24 @ 16:11) (18 - 18)  SpO2: 95% (05-31-24 @ 16:11) (94% - 99%)      05-31-24 @ 07:01  -  05-31-24 @ 19:44  --------------------------------------------------------  IN:    Oral Fluid: 550 mL  Total IN: 550 mL    OUT:    Voided (mL): 550 mL  Total OUT: 550 mL    Total NET: 0 mL          I independently performed a medically appropriate exam. The exam was notable for                          10.5   5.63  )-----------( 247      ( 30 May 2024 20:43 )             32.8     05-30    135  |  95<L>  |  22<H>  ----------------------------<  181<H>  5.2<H>   |  28  |  0.9    Ca 9.0; Mg 2.0; Phos 4.7          Lactate, Blood: 1.5 mmol/L (05-29 @ 00:39)  Lactate, Blood: 2.1 mmol/L (05-28 @ 17:08)  Lactate, Blood: 2.3 mmol/L (05-28 @ 04:30)  Lactate, Blood: 3.1 mmol/L (05-28 @ 02:10)        Assessment/Plan:  74y Female     Mike Park MD  Trauma/Acute Care Surgery/Surgical Critical Care Attending Trauma/Acute Care Surgery Attending Note Attestation    Patient was seen and examined bedside on 5/31/24.  I reviewed the resident/PA note and agreed with above assessment and plan with following additions and corrections.    Patient with no complaints. Hoping for rehab placement. No BM in awhile.    T(C): 37 (05-31-24 @ 16:11), Max: 37 (05-31-24 @ 16:11)  HR: 72 (05-31-24 @ 16:11) (72 - 74)  BP: 134/52 (05-31-24 @ 16:11) (130/66 - 173/70)  RR: 18 (05-31-24 @ 16:11) (18 - 18)  SpO2: 95% (05-31-24 @ 16:11) (94% - 99%)      05-31-24 @ 07:01  -  05-31-24 @ 19:44  --------------------------------------------------------  IN:    Oral Fluid: 550 mL  Total IN: 550 mL    OUT:    Voided (mL): 550 mL  Total OUT: 550 mL    Total NET: 0 mL    I independently performed a medically appropriate exam. The exam was notable for soft, not tender, not distended abdomen. No pelvic pain on palpation.                          10.5   5.63  )-----------( 247      ( 30 May 2024 20:43 )             32.8     05-30    135  |  95<L>  |  22<H>  ----------------------------<  181<H>  5.2<H>   |  28  |  0.9    Ca 9.0; Mg 2.0; Phos 4.7        Assessment/Plan:  74y Female s/p PMH of HTN, HLD, DM, OA s/p mechanical fall down 1 step with injuries as detailed above  - Right comminuted pubic rami fracture, Right medial acetabular wall fracture, Inferior right iliac fracture, Periprosthetic femoral shaft fracture - orthopedics was consulted, no acute intervention, TTWB RLE with walker, 6 weeks of ASA-81 mg BID as outpatient  - Acute kidney injury - resolved, encourage oral hydration  - Diabetes - 10 units lantus QHS, 4 units lispro TID with meals, SSI, metformin restarted while patient awaiting disposition  - HTN - home HCTZ/metoprolol, amlodipine 5mg daily   - HLD - simvastatin 40mg QHS  - Posttraumatic pain - multimodal pain therapy  - Constipation - continue senna and miralax; consider dulcolax if no BM in nxt day  - Thyroid nodules - outpatient thyroid ultrasound and PCP follow up  - DVT ppx  - Physical deconditioning - PT evaluation, recommending acute rehab but denied by insurance, awaiting subacute rehab placement    Mike Park MD  Trauma/Acute Care Surgery/Surgical Critical Care Attending

## 2024-06-01 LAB
GLUCOSE BLDC GLUCOMTR-MCNC: 184 MG/DL — HIGH (ref 70–99)
GLUCOSE BLDC GLUCOMTR-MCNC: 185 MG/DL — HIGH (ref 70–99)
GLUCOSE BLDC GLUCOMTR-MCNC: 196 MG/DL — HIGH (ref 70–99)
GLUCOSE BLDC GLUCOMTR-MCNC: 217 MG/DL — HIGH (ref 70–99)

## 2024-06-01 PROCEDURE — 99232 SBSQ HOSP IP/OBS MODERATE 35: CPT

## 2024-06-01 RX ORDER — MAGNESIUM HYDROXIDE 400 MG/1
30 TABLET, CHEWABLE ORAL DAILY
Refills: 0 | Status: DISCONTINUED | OUTPATIENT
Start: 2024-06-01 | End: 2024-06-03

## 2024-06-01 RX ADMIN — Medication 650 MILLIGRAM(S): at 06:12

## 2024-06-01 RX ADMIN — GABAPENTIN 100 MILLIGRAM(S): 400 CAPSULE ORAL at 14:41

## 2024-06-01 RX ADMIN — Medication 6: at 21:06

## 2024-06-01 RX ADMIN — METHOCARBAMOL 500 MILLIGRAM(S): 500 TABLET, FILM COATED ORAL at 11:37

## 2024-06-01 RX ADMIN — CHLORHEXIDINE GLUCONATE 1 APPLICATION(S): 213 SOLUTION TOPICAL at 06:17

## 2024-06-01 RX ADMIN — METHOCARBAMOL 500 MILLIGRAM(S): 500 TABLET, FILM COATED ORAL at 17:20

## 2024-06-01 RX ADMIN — Medication 6: at 08:09

## 2024-06-01 RX ADMIN — Medication 650 MILLIGRAM(S): at 12:17

## 2024-06-01 RX ADMIN — METHOCARBAMOL 500 MILLIGRAM(S): 500 TABLET, FILM COATED ORAL at 23:11

## 2024-06-01 RX ADMIN — Medication 50 MILLIGRAM(S): at 06:13

## 2024-06-01 RX ADMIN — Medication 4 UNIT(S): at 17:18

## 2024-06-01 RX ADMIN — METHOCARBAMOL 500 MILLIGRAM(S): 500 TABLET, FILM COATED ORAL at 06:13

## 2024-06-01 RX ADMIN — Medication 6: at 11:36

## 2024-06-01 RX ADMIN — Medication 4 UNIT(S): at 11:36

## 2024-06-01 RX ADMIN — Medication 650 MILLIGRAM(S): at 17:49

## 2024-06-01 RX ADMIN — Medication 650 MILLIGRAM(S): at 11:37

## 2024-06-01 RX ADMIN — INSULIN GLARGINE 10 UNIT(S): 100 INJECTION, SOLUTION SUBCUTANEOUS at 21:06

## 2024-06-01 RX ADMIN — AMLODIPINE BESYLATE 5 MILLIGRAM(S): 2.5 TABLET ORAL at 06:13

## 2024-06-01 RX ADMIN — ENOXAPARIN SODIUM 30 MILLIGRAM(S): 100 INJECTION SUBCUTANEOUS at 17:19

## 2024-06-01 RX ADMIN — Medication 4 UNIT(S): at 08:09

## 2024-06-01 RX ADMIN — MAGNESIUM HYDROXIDE 30 MILLILITER(S): 400 TABLET, CHEWABLE ORAL at 11:37

## 2024-06-01 RX ADMIN — METFORMIN HYDROCHLORIDE 1000 MILLIGRAM(S): 850 TABLET ORAL at 17:20

## 2024-06-01 RX ADMIN — Medication 50 MILLIGRAM(S): at 17:20

## 2024-06-01 RX ADMIN — Medication 8: at 17:19

## 2024-06-01 RX ADMIN — SIMVASTATIN 40 MILLIGRAM(S): 20 TABLET, FILM COATED ORAL at 21:07

## 2024-06-01 RX ADMIN — Medication 650 MILLIGRAM(S): at 17:19

## 2024-06-01 RX ADMIN — METFORMIN HYDROCHLORIDE 1000 MILLIGRAM(S): 850 TABLET ORAL at 06:12

## 2024-06-01 RX ADMIN — GABAPENTIN 100 MILLIGRAM(S): 400 CAPSULE ORAL at 21:07

## 2024-06-01 RX ADMIN — GABAPENTIN 100 MILLIGRAM(S): 400 CAPSULE ORAL at 06:13

## 2024-06-01 RX ADMIN — Medication 650 MILLIGRAM(S): at 23:11

## 2024-06-01 RX ADMIN — ENOXAPARIN SODIUM 30 MILLIGRAM(S): 100 INJECTION SUBCUTANEOUS at 06:12

## 2024-06-01 RX ADMIN — METHOCARBAMOL 500 MILLIGRAM(S): 500 TABLET, FILM COATED ORAL at 00:55

## 2024-06-01 NOTE — PROGRESS NOTE ADULT - ATTENDING COMMENTS
Trauma/Acute Care Surgery Attending Note Attestation    Patient was seen and examined bedside.  I reviewed the resident/PA note and agreed with above assessment and plan with following additions and corrections.    No acute events overnight. Awaiting rehab placement. No BM in several days.     T(C): 36.4 (06-01-24 @ 08:57), Max: 37 (05-31-24 @ 16:11)  HR: 65 (06-01-24 @ 08:57) (65 - 72)  BP: 148/65 (06-01-24 @ 08:57) (128/68 - 148/65)  RR: 18 (06-01-24 @ 08:57) (18 - 18)  SpO2: 96% (06-01-24 @ 08:57) (95% - 96%)    05-31-24 @ 07:01  -  06-01-24 @ 07:00  --------------------------------------------------------  IN:    Oral Fluid: 550 mL  Total IN: 550 mL    OUT:    Voided (mL): 550 mL  Total OUT: 550 mL    Total NET: 0 mL    I independently performed a medically appropriate exam. The exam was notable for soft, not tender, not distended abdomen. No pelvic pain on palpation.                          10.5   5.63  )-----------( 247      ( 30 May 2024 20:43 )             32.8     05-30    135  |  95<L>  |  22<H>  ----------------------------<  181<H>  5.2<H>   |  28  |  0.9    Ca 9.0; Mg 2.0; Phos 4.7    Lactate, Blood: 1.5 mmol/L (05-29 @ 00:39)  Lactate, Blood: 2.1 mmol/L (05-28 @ 17:08)      Assessment/Plan:  74y Female s/p PMH of HTN, HLD, DM, OA s/p mechanical fall down 1 step with injuries as detailed above  - Right comminuted pubic rami fracture, Right medial acetabular wall fracture, Inferior right iliac fracture, Periprosthetic femoral shaft fracture - orthopedics was consulted, no acute intervention, TTWB RLE with walker, 6 weeks of ASA-81 mg BID as outpatient  - Acute kidney injury - resolved, encourage oral hydration  - Diabetes - 10 units lantus QHS, 4 units lispro TID with meals, SSI, metformin restarted while patient awaiting disposition  - HTN - home HCTZ/metoprolol, amlodipine 5mg daily   - HLD - simvastatin 40mg QHS  - Posttraumatic pain - multimodal pain therapy  - Constipation - continue senna and miralax; added milk of magnesia today   - Thyroid nodules - outpatient thyroid ultrasound and PCP follow up  - DVT ppx  - Physical deconditioning - PT evaluation, recommending acute rehab but denied by insurance, awaiting subacute rehab placement    Mike Park MD  Trauma/Acute Care Surgery/Surgical Critical Care Attending

## 2024-06-02 LAB
CULTURE RESULTS: SIGNIFICANT CHANGE UP
CULTURE RESULTS: SIGNIFICANT CHANGE UP
GLUCOSE BLDC GLUCOMTR-MCNC: 147 MG/DL — HIGH (ref 70–99)
GLUCOSE BLDC GLUCOMTR-MCNC: 166 MG/DL — HIGH (ref 70–99)
GLUCOSE BLDC GLUCOMTR-MCNC: 212 MG/DL — HIGH (ref 70–99)
GLUCOSE BLDC GLUCOMTR-MCNC: 236 MG/DL — HIGH (ref 70–99)
SPECIMEN SOURCE: SIGNIFICANT CHANGE UP
SPECIMEN SOURCE: SIGNIFICANT CHANGE UP

## 2024-06-02 PROCEDURE — 99232 SBSQ HOSP IP/OBS MODERATE 35: CPT | Mod: 24,25

## 2024-06-02 RX ADMIN — GABAPENTIN 100 MILLIGRAM(S): 400 CAPSULE ORAL at 21:39

## 2024-06-02 RX ADMIN — METFORMIN HYDROCHLORIDE 1000 MILLIGRAM(S): 850 TABLET ORAL at 05:48

## 2024-06-02 RX ADMIN — Medication 4 UNIT(S): at 07:37

## 2024-06-02 RX ADMIN — CHLORHEXIDINE GLUCONATE 1 APPLICATION(S): 213 SOLUTION TOPICAL at 05:47

## 2024-06-02 RX ADMIN — Medication 650 MILLIGRAM(S): at 12:27

## 2024-06-02 RX ADMIN — METHOCARBAMOL 500 MILLIGRAM(S): 500 TABLET, FILM COATED ORAL at 05:48

## 2024-06-02 RX ADMIN — Medication 4 UNIT(S): at 17:42

## 2024-06-02 RX ADMIN — Medication 650 MILLIGRAM(S): at 05:48

## 2024-06-02 RX ADMIN — Medication 650 MILLIGRAM(S): at 11:57

## 2024-06-02 RX ADMIN — GABAPENTIN 100 MILLIGRAM(S): 400 CAPSULE ORAL at 14:22

## 2024-06-02 RX ADMIN — SIMVASTATIN 40 MILLIGRAM(S): 20 TABLET, FILM COATED ORAL at 21:39

## 2024-06-02 RX ADMIN — ENOXAPARIN SODIUM 30 MILLIGRAM(S): 100 INJECTION SUBCUTANEOUS at 17:43

## 2024-06-02 RX ADMIN — Medication 650 MILLIGRAM(S): at 06:48

## 2024-06-02 RX ADMIN — Medication 650 MILLIGRAM(S): at 17:44

## 2024-06-02 RX ADMIN — GABAPENTIN 100 MILLIGRAM(S): 400 CAPSULE ORAL at 05:48

## 2024-06-02 RX ADMIN — METHOCARBAMOL 500 MILLIGRAM(S): 500 TABLET, FILM COATED ORAL at 17:43

## 2024-06-02 RX ADMIN — Medication 50 MILLIGRAM(S): at 17:44

## 2024-06-02 RX ADMIN — Medication 50 MILLIGRAM(S): at 05:48

## 2024-06-02 RX ADMIN — METHOCARBAMOL 500 MILLIGRAM(S): 500 TABLET, FILM COATED ORAL at 11:58

## 2024-06-02 RX ADMIN — INSULIN GLARGINE 10 UNIT(S): 100 INJECTION, SOLUTION SUBCUTANEOUS at 21:42

## 2024-06-02 RX ADMIN — Medication 4 UNIT(S): at 11:57

## 2024-06-02 RX ADMIN — Medication 8: at 07:37

## 2024-06-02 RX ADMIN — ENOXAPARIN SODIUM 30 MILLIGRAM(S): 100 INJECTION SUBCUTANEOUS at 05:47

## 2024-06-02 RX ADMIN — Medication 8: at 17:43

## 2024-06-02 RX ADMIN — AMLODIPINE BESYLATE 5 MILLIGRAM(S): 2.5 TABLET ORAL at 05:48

## 2024-06-02 RX ADMIN — METFORMIN HYDROCHLORIDE 1000 MILLIGRAM(S): 850 TABLET ORAL at 17:43

## 2024-06-02 RX ADMIN — Medication 650 MILLIGRAM(S): at 00:11

## 2024-06-02 RX ADMIN — Medication 4: at 11:57

## 2024-06-02 NOTE — PROGRESS NOTE ADULT - ATTENDING COMMENTS
Trauma/ACS Attending  Note Attestation    Patient is examined and evaluated at the bedside with the residents/PAs. Treatment plan discussed with the team, nurses, and consulting physicians and consulting teams. Medications, radiological studies and all other relevant studies reviewed.     ALLEN SCHULZ Patient is a 74y old  Female who presents with a chief complaint of S/P FALL with multiple traumatic injuries included but not limited to  right hip periprosthetic fracture, right pubic rami fracture, right acetabular wall fracture, right inferior iliac fracture. Patient has UTI on admission which is treated with IV Abx and patient completed the course.     Diagnosis: right hip periprosthetic fracture due to trauma                  right pubic rami fracture due to trauma                  right acetabular wall fracture due to trauma                  right inferior iliac fracture due to trauma                   Leukocytosis                  UTI                  DM                  HTN    Plan: 	  - supportive care  - continue to adjust home HTN medication  - complete IV Abx for UTI  - monitor BG with adjusted ISS  - pain management  - incentive spirometer   - DVT prophylaxis       [x ] SCDs [x ] Lovenox SQ [ ] Heparins SQ  [ ] None  - GI prophylaxis  - follow up consults  - repeat studies as needed  - out of bed to chair  - PT evaluation and follow up  - replace electrolytes  - case management evaluation for discharge planning    Nayeli Michael MD, FACS  Trauma/ACS/Surgical Critical Care Attending

## 2024-06-03 ENCOUNTER — TRANSCRIPTION ENCOUNTER (OUTPATIENT)
Age: 74
End: 2024-06-03

## 2024-06-03 VITALS
TEMPERATURE: 98 F | RESPIRATION RATE: 18 BRPM | HEART RATE: 73 BPM | SYSTOLIC BLOOD PRESSURE: 137 MMHG | OXYGEN SATURATION: 95 % | DIASTOLIC BLOOD PRESSURE: 57 MMHG

## 2024-06-03 LAB
GLUCOSE BLDC GLUCOMTR-MCNC: 172 MG/DL — HIGH (ref 70–99)
GLUCOSE BLDC GLUCOMTR-MCNC: 191 MG/DL — HIGH (ref 70–99)
GLUCOSE BLDC GLUCOMTR-MCNC: 197 MG/DL — HIGH (ref 70–99)

## 2024-06-03 PROCEDURE — 99232 SBSQ HOSP IP/OBS MODERATE 35: CPT | Mod: 24,25

## 2024-06-03 PROCEDURE — 99232 SBSQ HOSP IP/OBS MODERATE 35: CPT

## 2024-06-03 RX ORDER — ACETAMINOPHEN 500 MG
2 TABLET ORAL
Qty: 0 | Refills: 0 | DISCHARGE
Start: 2024-06-03

## 2024-06-03 RX ORDER — ASPIRIN/CALCIUM CARB/MAGNESIUM 324 MG
1 TABLET ORAL
Qty: 0 | Refills: 0 | DISCHARGE

## 2024-06-03 RX ORDER — AMLODIPINE BESYLATE 2.5 MG/1
1 TABLET ORAL
Qty: 0 | Refills: 0 | DISCHARGE
Start: 2024-06-03

## 2024-06-03 RX ORDER — LIDOCAINE 4 G/100G
1 CREAM TOPICAL
Qty: 0 | Refills: 0 | DISCHARGE
Start: 2024-06-03

## 2024-06-03 RX ORDER — SIMVASTATIN 20 MG/1
1 TABLET, FILM COATED ORAL
Qty: 0 | Refills: 0 | DISCHARGE
Start: 2024-06-03

## 2024-06-03 RX ORDER — INSULIN GLARGINE 100 [IU]/ML
14 INJECTION, SOLUTION SUBCUTANEOUS AT BEDTIME
Refills: 0 | Status: DISCONTINUED | OUTPATIENT
Start: 2024-06-03 | End: 2024-06-03

## 2024-06-03 RX ADMIN — Medication 50 MILLIGRAM(S): at 05:49

## 2024-06-03 RX ADMIN — Medication 650 MILLIGRAM(S): at 17:12

## 2024-06-03 RX ADMIN — Medication 650 MILLIGRAM(S): at 11:34

## 2024-06-03 RX ADMIN — POLYETHYLENE GLYCOL 3350 17 GRAM(S): 17 POWDER, FOR SOLUTION ORAL at 17:13

## 2024-06-03 RX ADMIN — AMLODIPINE BESYLATE 5 MILLIGRAM(S): 2.5 TABLET ORAL at 05:49

## 2024-06-03 RX ADMIN — Medication 50 MILLIGRAM(S): at 17:13

## 2024-06-03 RX ADMIN — METHOCARBAMOL 500 MILLIGRAM(S): 500 TABLET, FILM COATED ORAL at 11:35

## 2024-06-03 RX ADMIN — Medication 4 UNIT(S): at 17:13

## 2024-06-03 RX ADMIN — GABAPENTIN 100 MILLIGRAM(S): 400 CAPSULE ORAL at 13:28

## 2024-06-03 RX ADMIN — GABAPENTIN 100 MILLIGRAM(S): 400 CAPSULE ORAL at 05:49

## 2024-06-03 RX ADMIN — POLYETHYLENE GLYCOL 3350 17 GRAM(S): 17 POWDER, FOR SOLUTION ORAL at 05:49

## 2024-06-03 RX ADMIN — ENOXAPARIN SODIUM 30 MILLIGRAM(S): 100 INJECTION SUBCUTANEOUS at 05:49

## 2024-06-03 RX ADMIN — Medication 4 UNIT(S): at 11:34

## 2024-06-03 RX ADMIN — Medication 650 MILLIGRAM(S): at 05:49

## 2024-06-03 RX ADMIN — METFORMIN HYDROCHLORIDE 1000 MILLIGRAM(S): 850 TABLET ORAL at 17:12

## 2024-06-03 RX ADMIN — ENOXAPARIN SODIUM 30 MILLIGRAM(S): 100 INJECTION SUBCUTANEOUS at 17:11

## 2024-06-03 RX ADMIN — Medication 6: at 11:34

## 2024-06-03 RX ADMIN — METHOCARBAMOL 500 MILLIGRAM(S): 500 TABLET, FILM COATED ORAL at 17:12

## 2024-06-03 RX ADMIN — Medication 4: at 17:14

## 2024-06-03 RX ADMIN — Medication 4 UNIT(S): at 08:10

## 2024-06-03 RX ADMIN — Medication 6: at 08:10

## 2024-06-03 NOTE — DISCHARGE NOTE PROVIDER - NSDCMRMEDTOKEN_GEN_ALL_CORE_FT
acetaminophen 325 mg oral tablet: 2 tab(s) orally every 6 hours for at least 3 days as needed thereafter  amLODIPine 5 mg oral tablet: 1 tab(s) orally once a day  aspirin 81 mg oral tablet: 1 tab(s) orally once a day for 6 weeks  gabapentin 100 mg oral capsule: 1 cap(s) orally 3 times a day  hydroCHLOROthiazide 25 mg oral tablet: 1 tab(s) orally once a day  lidocaine 4% topical film: Apply topically to affected area once a day Leave on for 12 hours, remove for 12 hours wash hands after use  metFORMIN 1000 mg oral tablet: 1 tab(s) orally 2 times a day  metoprolol succinate 100 mg oral tablet, extended release: 1 tab(s) orally once a day  Zocor 40 mg oral tablet: 1 tab(s) orally once a day (at bedtime)  Zocor with Ezatimibe 10/10m tab(s) orally once a day (at bedtime)

## 2024-06-03 NOTE — DISCHARGE NOTE PROVIDER - HOSPITAL COURSE
74yF w/ PMHx of HTN, HLD, DM, OA, right hip surgery seen as a trauma transfer from [facility] s/p mechanical fall today -HT, -LOC, -AC.  Patient was entering her friend's home from the garage when she tripped over 1 step falling on to her right side. She was not able to get up and walk without great amount of pain. Denies pain to anywhere else. Denies chest pain, SOB, fever or chills, abdominal pain or difficulty urinating. Trauma assessment in ED: ABCs intact , GCS 15 , AAOx3    SICU Consulted to hemodynamic monitoring status post fall on 5/28.  Patient was seen bedside in the ED. Patient is a/ox3, not in any acute distress. Patient reported right hip pain when moving but otherwise denies all pain when sitting still. Patient denies chest pain, dyspnea, anxiety,  abdominal pain, nausea, paresthesias. Patient during day was hypertensive up to 180's, amlodipine 5mg was added to her home med regimen. Pain well controlled. Breathing on nasal canula. Lactate downtrending. Patient was able to work with PT and Physiatry. Tolerating diet. Lantus and Lispro started. On 5/29 patient continue to following commands, IS 2000, -160's, tolerating regular diet, passing gas no bowel movement. Wei catheter was discontinued, pending TOV. Afebrile on Ceftriaxone due to UTI. Patient will be downgraded to floor.     After downgrade to the floor the patient was awaiting for insurance authorization for 4a that was denied then waited for authorization for SNF. Insulin regimen adjusted. 74yF w/ PMHx of HTN, HLD, DM, OA, right hip surgery seen as a trauma transfer from [facility] s/p mechanical fall today -HT, -LOC, -AC.  Patient was entering her friend's home from the garage when she tripped over 1 step falling on to her right side. She was not able to get up and walk without great amount of pain. Denies pain to anywhere else. Denies chest pain, SOB, fever or chills, abdominal pain or difficulty urinating. Trauma assessment in ED: ABCs intact , GCS 15 , AAOx3    SICU Consulted to hemodynamic monitoring status post fall on 5/28.  Patient was seen bedside in the ED. Patient is a/ox3, not in any acute distress. Patient reported right hip pain when moving but otherwise denies all pain when sitting still. Patient denies chest pain, dyspnea, anxiety,  abdominal pain, nausea, paresthesias. Patient during day was hypertensive up to 180's, amlodipine 5mg was added to her home med regimen. Pain well controlled. Breathing on nasal canula. Lactate downtrending. Patient was able to work with PT and Physiatry. Tolerating diet. Lantus and Lispro started. On 5/29 patient continue to following commands, IS 2000, -160's, tolerating regular diet, passing gas no bowel movement. Wei catheter was discontinued, pending TOV. Afebrile on Ceftriaxone due to UTI. Patient will be downgraded to floor.     After downgrade to the floor the patient was awaiting for insurance authorization for 4a that was denied then waited for authorization for SNF. Insulin regimen adjusted. The patient is medically cleared for discharge.

## 2024-06-03 NOTE — PROGRESS NOTE ADULT - ATTENDING COMMENTS
Trauma/ACS Attending  Note Attestation    Patient is examined and evaluated at the bedside with the residents/PAs. Treatment plan discussed with the team, nurses, and consulting physicians and consulting teams. Medications, radiological studies and all other relevant studies reviewed.     ALLEN SCHULZ Patient is a 74y old  Female who presents with a chief complaint of S/P FALL with multiple traumatic injuries included but not limited to  right hip periprosthetic fracture, right pubic rami fracture, right acetabular wall fracture, right inferior iliac fracture. Patient has UTI on admission which is treated with IV Abx and patient completed the course.     Patient is alert and awake, follows commands    Diagnosis: right hip periprosthetic fracture due to trauma                  right pubic rami fracture due to trauma                  right acetabular wall fracture due to trauma                  right inferior iliac fracture due to trauma                   Leukocytosis                  UTI                  DM                  HTN    Plan: 	  - supportive care  - continue to adjust home HTN medication  - complete IV Abx for UTI  - monitor BG with adjusted ISS  - pain management  - incentive spirometer   - DVT prophylaxis       [x ] SCDs [x ] Lovenox SQ [ ] Heparins SQ  [ ] None  - GI prophylaxis  - follow up consults  - repeat studies as needed  - out of bed to chair  - PT evaluation and follow up  - replace electrolytes  - case management evaluation for discharge planning    Nayeli Michael MD, FACS  Trauma/ACS/Surgical Critical Care Attending

## 2024-06-03 NOTE — PROGRESS NOTE ADULT - SUBJECTIVE AND OBJECTIVE BOX
GENERAL SURGERY PROGRESS NOTE     ALLEN SCHULZ  74y  Female  Hospital day :5d    OVERNIGHT EVENTS:  - No acute events overnight  - Remained hemodynamically stable    T(F): 98.1 (06-01-24 @ 00:36), Max: 98.6 (05-31-24 @ 16:11)  HR: 69 (06-01-24 @ 00:36) (69 - 74)  BP: 128/68 (06-01-24 @ 00:36) (128/68 - 173/70)  RR: 18 (06-01-24 @ 00:36) (18 - 18)  SpO2: 96% (06-01-24 @ 00:36) (94% - 99%)    AC/ proph: enoxaparin Injectable 30 milliGRAM(s) SubCutaneous every 12 hours    PHYSICAL EXAM:  GENERAL: NAD  CHEST/LUNG: Clear to auscultation bilaterally  HEART: Regular rate and rhythm  ABDOMEN: Soft, Nontender, Nondistended;       LABS  Labs:  CAPILLARY BLOOD GLUCOSE      POCT Blood Glucose.: 210 mg/dL (31 May 2024 20:47)  POCT Blood Glucose.: 193 mg/dL (31 May 2024 16:44)  POCT Blood Glucose.: 225 mg/dL (31 May 2024 11:12)  POCT Blood Glucose.: 258 mg/dL (31 May 2024 08:26)  POCT Blood Glucose.: 211 mg/dL (31 May 2024 07:19)                          10.5   5.63  )-----------( 247      ( 30 May 2024 20:43 )             32.8         05-30    135  |  95<L>  |  22<H>  ----------------------------<  181<H>  5.2<H>   |  28  |  0.9        Urinalysis Basic - ( 30 May 2024 20:43 )    Color: x / Appearance: x / SG: x / pH: x  Gluc: 181 mg/dL / Ketone: x  / Bili: x / Urobili: x   Blood: x / Protein: x / Nitrite: x   Leuk Esterase: x / RBC: x / WBC x   Sq Epi: x / Non Sq Epi: x / Bacteria: x    
GENERAL SURGERY PROGRESS NOTE     ALLEN SCHULZ  74y  Female  Hospital day :6d  POD:  Procedure:   OVERNIGHT EVENTS: no acute events overnight, pain controlled. recovering well. pending dispo to [facility]    T(F): 98.6 (06-02-24 @ 23:40), Max: 98.6 (06-02-24 @ 23:40)  HR: 74 (06-02-24 @ 23:40) (72 - 76)  BP: 144/68 (06-02-24 @ 23:40) (120/65 - 150/68)  ABP: --  ABP(mean): --  RR: 18 (06-02-24 @ 23:40) (18 - 18)  SpO2: 94% (06-02-24 @ 23:40) (94% - 97%)    DIET/FLUIDS:   NG:                                                                              DRAINS:   BM:   EMESIS:   URINE:    GI proph:    AC/ proph: enoxaparin Injectable 30 milliGRAM(s) SubCutaneous every 12 hours    ABx:     PHYSICAL EXAM:  GENERAL: NAD  CHEST/LUNG: Non-labored breathing  HEART: Regular rate and rhythm  ABDOMEN: Soft, Nontender, Nondistended;       LABS  Labs:  CAPILLARY BLOOD GLUCOSE      POCT Blood Glucose.: 147 mg/dL (02 Jun 2024 21:00)  POCT Blood Glucose.: 236 mg/dL (02 Jun 2024 16:44)  POCT Blood Glucose.: 166 mg/dL (02 Jun 2024 11:20)  POCT Blood Glucose.: 212 mg/dL (02 Jun 2024 07:30)                  LFTs:         Coags:                    RADIOLOGY & ADDITIONAL TESTS:      A/P        
***Patient evaluation and chart review in progress. Full consult note to follow.***
ALLEN SCHULZ   [id number]   [birth year]  74yF    ============================   SICU Consult for hemodynamic monitoring  ============================   HPI:  74yF w/ PMHx of HTN, HLD, DM, OA s/p right ABIMBOLA, seen as a trauma transfer from [facility] s/p mechanical fall today -HT, -LOC, -AC.  Patient was entering her cousin's home from the garage when she tripped over 1 step falling on to her right side. She was unable to ambulate after the fall. Denies pain to anywhere else. Denies chest pain, SOB, fever or chills, abdominal pain or difficulty urinating. Trauma assessment in ED: ABCs intact , GCS 15 , AAOx3.    SICU Consult:  Patient was seen bedside in the ED. Patient is a/ox3, not in any acute distress. Patient reports right hip pain when moving but otherwise denies all pain when sitting still. Patient denies chest pain, dyspnea, anxiety, abdominal pain, nausea, paresthesias. Vital signs: systolic BP 180s,  sinus rhythm, SpO2 96% on room air.    PAST MEDICAL & SURGICAL HISTORY:  DM (diabetes mellitus)  Hypercholesteremia  HTN (hypertension)  OA (osteoarthritis)  History of joint surgery  Right HIP ORIF  [facility] [place]  History of tonsillectomy and adenoidectomy  1957      Allergies  No Known Allergies  Intolerances (28 May 2024 02:13)    Injuries:  - right inferior pubic rami fracture  - right periprosthetic femoral shaft fracture  - right medial acetabular wall fracture  - right inferior iliac fracture (just superior to right acetabular prosthesis)  - chronic right 4-6th rib fractures  - bladder injury (concern for posterior bladder rupture)     24 Hour Events  5/28   Night   - palpable DP and PT pulses bilaterally  - RLE 5/5 strength to plantar flexion and dorsiflexion   - unable to lift RLE against gravity  - consider increasing preprandial insulin dose?  - IVL      [X] A ten-point review of systems was otherwise negative except as noted above.  [  ] Due to altered mental status/intubation, subjective information was not attained from the patient. History was obtained, to the extent possible, from review of the chart and collateral sources of information.  
GENERAL SURGERY PROGRESS NOTE     ALLEN SCHULZ  [address] [place] [address] day :7d    POD:  Procedure:   Surgical Attending: Nayeli Michael  Overnight events: No acute events overnight. Pt has remained hemodynamically stable and afebrile overnight.     T(F): 97.7 (06-02-24 @ 01:07), Max: 97.8 (06-01-24 @ 04:00)  HR: 70 (06-02-24 @ 01:07) (65 - 80)  BP: 125/67 (06-02-24 @ 01:07) (125/67 - 148/65)  ABP: --  ABP(mean): --  RR: 18 (06-02-24 @ 01:07) (18 - 18)  SpO2: 95% (06-02-24 @ 01:07) (95% - 96%)    IN'S / OUT's:    05-31-24 @ 07:01  -  06-01-24 @ 07:00  --------------------------------------------------------  IN:    Oral Fluid: 550 mL  Total IN: 550 mL    OUT:    Voided (mL): 550 mL  Total OUT: 550 mL    Total NET: 0 mL          PHYSICAL EXAM:  GENERAL: NAD  CHEST/LUNG: equal chest rise B/L  HEART: Regular rate and rhythm  ABDOMEN: Soft, Nontender, Nondistended;   EXTREMITIES:  No clubbing, cyanosis, or edema      LABS  Labs:  CAPILLARY BLOOD GLUCOSE      POCT Blood Glucose.: 196 mg/dL (01 Jun 2024 20:45)  POCT Blood Glucose.: 217 mg/dL (01 Jun 2024 17:01)  POCT Blood Glucose.: 184 mg/dL (01 Jun 2024 11:24)  POCT Blood Glucose.: 185 mg/dL (01 Jun 2024 07:42)                  LFTs:         Coags:                    RADIOLOGY & ADDITIONAL TESTS:      A/P:  ALLEN SCHULZ is a 74yFemale w/ PMHx of HTN, HLD, DM, OA, h/o right hip surgery, seen as a trauma transfer from [facility] s/p mechanical fall (-HT, -LOC, -AC). The following injuries were identified:     # Right comminuted pubic rami fracture   # Right medial acetabular wall fracture   # Inferior right iliac fracture   # Periprosthetic femoral shaft fracture   # Chronic right 4-6 rib fractures       PLAN:   - pt insurance denied placement in 4A, Janeth sent by CM  - multi modal pain control  - monitor FS  - DVT ppx  - continue with DASH diet  - Ortho recs appreciated -  no acute surgical intervention; f/u OP w/ Dr Fernandse-Sadie in 2 weeks   - f/u urine culture  - F/u CM/SW for dispo planning         #Antibiotics:  Day **  #DVT ppx: enoxaparin Injectable 30 milliGRAM(s) SubCutaneous every 12 hours    #GI ppx:   #Bowel regimen:   ***Senna/Mirilax/Mineral oil/Dulcolax     Disposition:  ***    Above plan discussed with Attending Surgeon  ***  , patient, patient family, and Primary team    Blue Spectra [id number]  TAP (Trauma, Acute care, Pediatrics) Spectra [phone]  Green Spectra [phone]  Vascular [phone]  
GENERAL SURGERY PROGRESS NOTE    Patient: ALLEN SCHULZ , 74y ([birth year])Female   MRN: [id number]  Location: [address]  Visit: 05-28-24 Inpatient  Date: 05-30-24 @ 10:46    Hospital Day #: 4    Events of past 24 hours: Downgraded from SICU, ambulating. Plan for discharge to  once authorization is obtained. Creatinine bumped to 1.2 from 0.9, given a bolus, will repeat BMP at 11am.     PAST MEDICAL & SURGICAL HISTORY:  DM (diabetes mellitus)  Hypercholesteremia  HTN (hypertension)  OA (osteoarthritis)  History of joint surgery  Right HIP ORIF  [facility] [place]  History of tonsillectomy and adenoidectomy  1957      Vitals:   T(F): 98.4 (05-30-24 @ 09:59), Max: 99.2 (05-29-24 @ 12:00)  HR: 74 (05-30-24 @ 09:59)  BP: 126/67 (05-30-24 @ 09:59)  RR: 18 (05-30-24 @ 09:59)  SpO2: 98% (05-30-24 @ 09:59)      Diet, DASH/TLC:   Sodium & Cholesterol Restricted  Consistent Carbohydrate No Snacks (CSTCHO)      Fluids:     I & O's:    05-29-24 @ 07:01  -  05-30-24 @ 07:00  --------------------------------------------------------  IN:    IV PiggyBack: 100 mL  Total IN: 100 mL    OUT:    Indwelling Catheter - Urethral (mL): 250 mL    Voided (mL): 300 mL  Total OUT: 550 mL    Total NET: -450 mL    PHYSICAL EXAM:  GENERAL: NAD, well-appearing  CHEST/LUNG: Equal chest rise bilaterally  HEART: Regular rate and rhythm  ABDOMEN: Soft, Nontender, Nondistended;   EXTREMITIES:  No clubbing, cyanosis, or edema      MEDICATIONS  (STANDING):  acetaminophen     Tablet .. 650 milliGRAM(s) Oral every 6 hours  amLODIPine   Tablet 5 milliGRAM(s) Oral daily  cefTRIAXone   IVPB 1000 milliGRAM(s) IV Intermittent every 24 hours  chlorhexidine 2% Cloths 1 Application(s) Topical <User Schedule>  enoxaparin Injectable 30 milliGRAM(s) SubCutaneous every 12 hours  gabapentin 100 milliGRAM(s) Oral every 8 hours  hydrochlorothiazide 25 milliGRAM(s) Oral daily  insulin glargine Injectable (LANTUS) 10 Unit(s) SubCutaneous at bedtime  insulin lispro (ADMELOG) corrective regimen sliding scale   SubCutaneous Before meals and at bedtime  insulin lispro Injectable (ADMELOG) 4 Unit(s) SubCutaneous three times a day before meals  lidocaine   4% Patch 1 Patch Transdermal daily  methocarbamol 500 milliGRAM(s) Oral every 6 hours  metoprolol tartrate 50 milliGRAM(s) Oral every 12 hours  polyethylene glycol 3350 17 Gram(s) Oral every 12 hours  senna 2 Tablet(s) Oral at bedtime  simvastatin 40 milliGRAM(s) Oral at bedtime    MEDICATIONS  (PRN):      DVT PROPHYLAXIS: enoxaparin Injectable 30 milliGRAM(s) SubCutaneous every 12 hours    GI PROPHYLAXIS:   ANTICOAGULATION:   ANTIBIOTICS:  cefTRIAXone   IVPB 1000 milliGRAM(s)    LAB/STUDIES:  Labs:  CAPILLARY BLOOD GLUCOSE      POCT Blood Glucose.: 194 mg/dL (30 May 2024 08:05)  POCT Blood Glucose.: 188 mg/dL (29 May 2024 21:36)  POCT Blood Glucose.: 191 mg/dL (29 May 2024 16:44)  POCT Blood Glucose.: 206 mg/dL (29 May 2024 11:15)                          11.6   6.76  )-----------( 264      ( 29 May 2024 20:00 )             37.1         05-29    135  |  98  |  23<H>  ----------------------------<  177<H>  4.5   |  26  |  1.2      Calcium: 9.1 mg/dL (05-29-24 @ 20:00)      LFTs:     Lactate, Blood: 1.5 mmol/L (05-29-24 @ 00:39)  Lactate, Blood: 2.1 mmol/L (05-28-24 @ 17:08)  Lactate, Blood: 2.3 mmol/L (05-28-24 @ 04:30)  Lactate, Blood: 3.1 mmol/L (05-28-24 @ 02:10)    Urinalysis Basic - ( 29 May 2024 20:00 )    Color: x / Appearance: x / SG: x / pH: x  Gluc: 177 mg/dL / Ketone: x  / Bili: x / Urobili: x   Blood: x / Protein: x / Nitrite: x   Leuk Esterase: x / RBC: x / WBC x   Sq Epi: x / Non Sq Epi: x / Bacteria: x    Culture - Blood (collected 28 May 2024 02:10)  Source: .Blood Blood  Preliminary Report (30 May 2024 09:01):    No growth at 48 Hours    Culture - Blood (collected 28 May 2024 02:10)  Source: .Blood Blood  Preliminary Report (30 May 2024 09:01):    No growth at 48 Hours    Urinalysis with Rflx Culture (collected 27 May 2024 22:39)      IMAGING:  n/a    ACCESS/ DEVICES:  [x ] Peripheral IV        
 TRAUMA SURGERY PROGRESS NOTE    Patient: ALLEN SCHULZ , 74y ([birth year])Female   MRN: [id number]  Location: [place] [address] A  Visit: 05-28-24 Inpatient  Date: 05-29-24 @ 12:21    Hospital Day #:  2    INJURIES / DIAGNOSIS / PROCEDURES:  1) RIGHT HIP PERIPROSTHETIC FX  2) RIGHT PUBIC RAMI FXS  3) ACETABULAR WALL FX  4) INFERIOR R ILIAC FRACTURE  + UTI (PRESENT ON ADMISSION)    INTERVAL HX:  No acute events overnight. Afebrile, VSS.   Pending downgrade to floor.     VITALS:   T(F): 99.2 (05-29-24 @ 12:00), Max: 99.2 (05-29-24 @ 12:00)  HR: 81 (05-29-24 @ 12:00)  BP: 124/69 (05-29-24 @ 12:00)  RR: 25 (05-29-24 @ 12:00)  SpO2: 93% (05-29-24 @ 12:00)        05-28 @ 07:01  -  05-29 @ 07:00  --------------------------------------------------------  OUT:    Indwelling Catheter - Urethral (mL): 585 mL    Intermittent Catheterization - Urethral (mL): 700 mL  Total OUT: 1285 mL    PHYSICAL EXAM:  General Appearance: NAD, AAOx3   HEENT: EOMI, sclera non-icteric.  Heart: Regular rate   Lungs: Breathing comfortably  Abdomen:  Soft, nontender, nondistended.   MSK/Extremities: Warm & well-perfused.   Skin: Warm, dry. No jaundice.     LABS:                        10.5   5.39  )-----------( 177       05-29 @ 00:39             34.0     137  |  101  |  19  ----------------------------<  190        05-29 @ 00:39  4.1   |  24  |  0.9      Coagulation Studies - 05-27 @ 20:00  PT: 12.80 sec  PTT: 33.5 sec  INR: 1.12 ratio    Calcium: 8.7 mg/dL (05-29 @ 00:39)  Magnesium: 2.2 mg/dL (05-29 @ 00:39)  Phosphorus: 4.5 mg/dL (05-29 @ 00:39)    Lactate, Blood: 1.5 mmol/L (05-29-24 @ 00:39)  Lactate, Blood: 2.1 mmol/L (05-28-24 @ 17:08)    LIVER FUNCTIONS - ( 27 May 2024 20:00 )  Alb: 4.1 g/dL / Pro: 7.0 g/dL / ALK PHOS: 121 U/L / ALT: 15 U/L / AST: 18 U/L / GGT: x           RADIOLOGY & OTHER STUDIES: No new imaging obtained in the past 24h       ACCESS DEVICES:  [X] Peripheral IV  [ ] Central Venous Line	[ ] R	[ ] L	[ ] IJ	[ ] Fem	[ ] SC	Placed:   [ ] Arterial Line		[ ] R	[ ] L	[ ] Fem	[ ] Rad	[ ] Ax	Placed:   [ ] PICC:					[ ] Mediport  [ ] Urinary Catheter,  Date Placed:   [ ] Chest tube: [ ] Right, [ ] Left  [ ] DANA/Ricci Drains
GENERAL SURGERY PROGRESS NOTE     ALLEN SCHULZ  74y  Female  Hospital day :3d  POD:  Procedure:   OVERNIGHT EVENTS: no acute events overnight, patient is pending authorization and bed in 4A. Cr stable at 0.9    T(F): 98.4 (05-31-24 @ 08:25), Max: 98.7 (05-30-24 @ 17:01)  HR: 72 (05-31-24 @ 08:25) (72 - 75)  BP: 130/66 (05-31-24 @ 08:25) (130/66 - 173/70)  ABP: --  ABP(mean): --  RR: 18 (05-31-24 @ 08:25) (18 - 18)  SpO2: 94% (05-31-24 @ 08:25) (94% - 99%)    DIET/FLUIDS:   NG:                                                                              DRAINS:   BM:   EMESIS:   URINE:    GI proph:    AC/ proph: enoxaparin Injectable 30 milliGRAM(s) SubCutaneous every 12 hours    ABx:     PHYSICAL EXAM:  GENERAL: NAD  CHEST/LUNG: Non-labored breathing  HEART: Regular rate and rhythm  ABDOMEN: Soft, Nontender, Nondistended;   EXTREMITIES:  No clubbing, cyanosis, or edema    LABS  Labs:  CAPILLARY BLOOD GLUCOSE      POCT Blood Glucose.: 258 mg/dL (31 May 2024 08:26)  POCT Blood Glucose.: 211 mg/dL (31 May 2024 07:19)  POCT Blood Glucose.: 206 mg/dL (30 May 2024 21:29)  POCT Blood Glucose.: 199 mg/dL (30 May 2024 17:40)  POCT Blood Glucose.: 271 mg/dL (30 May 2024 11:46)                          10.5   5.63  )-----------( 247      ( 30 May 2024 20:43 )             32.8       Auto Neutrophil %: 50.9 % (05-30-24 @ 20:43)  Auto Immature Granulocyte %: 0.2 % (05-30-24 @ 20:43)    05-30    135  |  95<L>  |  22<H>  ----------------------------<  181<H>  5.2<H>   |  28  |  0.9      Calcium: 9.0 mg/dL (05-30-24 @ 20:43)      LFTs:     Lactate, Blood: 1.5 mmol/L (05-29-24 @ 00:39)  Lactate, Blood: 2.1 mmol/L (05-28-24 @ 17:08)      Coags:            Urinalysis Basic - ( 30 May 2024 20:43 )    Color: x / Appearance: x / SG: x / pH: x  Gluc: 181 mg/dL / Ketone: x  / Bili: x / Urobili: x   Blood: x / Protein: x / Nitrite: x   Leuk Esterase: x / RBC: x / WBC x   Sq Epi: x / Non Sq Epi: x / Bacteria: x            RADIOLOGY & ADDITIONAL TESTS:      A/P        
Patient is a 74y old  Female who presents with a chief complaint of PELVIC FRACTURES S/P FALL     HPI:    74yF w/ PMHx of HTN, HLD, DM, OA, right hip surgery seen as a trauma transfer from [facility] s/p mechanical fall today -HT, -LOC, -AC.  Patient was entering her friend's home from the garage when she tripped over 1 step falling on to her right side. She was not able to get up and walk without great amount of pain. Denies pain to anywhere else. Denies chest pain, SOB, fever or chills, abdominal pain or difficulty urinating. Trauma assessment in ED: ABCs intact , GCS 15 , AAOx3.    Injuries:  - right inferior pubic rami fracture  - right periprosthetic femoral shaft fracture  - right medial acetabular wall fracture  - right inferior iliac fracture (just superior to right acetabular prosthesis)  - bladder injury (concern for posterior bladder rupture)    #Bladder injury (suspected posterior bladder wall rupture)  - urology following  - CT cystogram prelim negative for extravasation of contrast    - ortho following  - no acute surgical intervention  - f/u outpatient with Dr. Beard in 2 weeks   Activity - Weight Bearing Status:     Left Lower Extremity:  Full Weight Bearing    Right Lower Extremity:  Toe Touch Weight Bearing     PAST MEDICAL & SURGICAL HISTORY:  DM (diabetes mellitus)  Hypercholesteremia  HTN (hypertension)  OA (osteoarthritis)  History of joint surgery  Right HIP ORIF  [facility]  History of tonsillectomy and adenoidectomy  1957      Allergies  No Known Allergies  Intolerances               PHYSICAL EXAM    Vital Signs Last 24 Hrs  T(C): 37.3 (29 May 2024 12:00), Max: 37.3 (29 May 2024 12:00)  T(F): 99.2 (29 May 2024 12:00), Max: 99.2 (29 May 2024 12:00)  HR: 81 (29 May 2024 12:00) (70 - 86)  BP: 124/69 (29 May 2024 12:00) (121/58 - 167/72)  BP(mean): 91 (29 May 2024 12:00) (83 - 99)  RR: 25 (29 May 2024 12:00) (13 - 27)  SpO2: 93% (29 May 2024 12:00) (89% - 96%)    Parameters below as of 29 May 2024 04:00  Patient On (Oxygen Delivery Method): room air        Constitutional - NAD  Chest - CTA  Cardiovascular - S1S2+  Abdomen -  Soft  Extremities -  No calf tenderness   Function : bed mobility and transfer min A              ambulate 5 steps RW min A / lower body dressing mod A    acetaminophen     Tablet .. 650 milliGRAM(s) Oral every 6 hours  amLODIPine   Tablet 5 milliGRAM(s) Oral daily  cefTRIAXone   IVPB 1000 milliGRAM(s) IV Intermittent every 24 hours  chlorhexidine 2% Cloths 1 Application(s) Topical <User Schedule>  enoxaparin Injectable 30 milliGRAM(s) SubCutaneous every 12 hours  gabapentin 100 milliGRAM(s) Oral every 8 hours  hydrochlorothiazide 25 milliGRAM(s) Oral daily  insulin glargine Injectable (LANTUS) 10 Unit(s) SubCutaneous at bedtime  insulin lispro (ADMELOG) corrective regimen sliding scale   SubCutaneous Before meals and at bedtime  insulin lispro Injectable (ADMELOG) 7 Unit(s) SubCutaneous three times a day before meals  lidocaine   4% Patch 1 Patch Transdermal daily  methocarbamol 500 milliGRAM(s) Oral every 6 hours  metoprolol tartrate 50 milliGRAM(s) Oral every 12 hours  polyethylene glycol 3350 17 Gram(s) Oral every 12 hours  senna 2 Tablet(s) Oral at bedtime  simvastatin 40 milliGRAM(s) Oral at bedtime      RECENT LABS/IMAGING                        10.5   5.39  )-----------( 177      ( 29 May 2024 00:39 )             34.0     05-29    137  |  101  |  19  ----------------------------<  190<H>  4.1   |  24  |  0.9    Ca    8.7      29 May 2024 00:39  Phos  4.5     05-29  Mg     2.2     05-29    TPro  7.0  /  Alb  4.1  /  TBili  0.3  /  DBili  x   /  AST  18  /  ALT  15  /  AlkPhos  121<H>  05-27    PT/INR - ( 27 May 2024 20:00 )   PT: 12.80 sec;   INR: 1.12 ratio         PTT - ( 27 May 2024 20:00 )  PTT:33.5 sec  Urinalysis Basic - ( 29 May 2024 00:39 )    Color: x / Appearance: x / SG: x / pH: x  Gluc: 190 mg/dL / Ketone: x  / Bili: x / Urobili: x   Blood: x / Protein: x / Nitrite: x   Leuk Esterase: x / RBC: x / WBC x   Sq Epi: x / Non Sq Epi: x / Bacteria: x            
S: Pain controlled  walked few steps with PT today   primary team (trauma) considering 4A transfer.       All other pertinent ROS negative.      05-28-24 @ 07:01  -  05-29-24 @ 07:00  --------------------------------------------------------  IN: 620 mL / OUT: 1285 mL / NET: -665 mL    05-29-24 @ 07:01  -  05-29-24 @ 16:08  --------------------------------------------------------  IN: 100 mL / OUT: 250 mL / NET: -150 mL      Vital Signs Last 24 Hrs  T(C): 37.3 (29 May 2024 12:00), Max: 37.3 (29 May 2024 12:00)  T(F): 99.2 (29 May 2024 12:00), Max: 99.2 (29 May 2024 12:00)  HR: 77 (29 May 2024 14:31) (70 - 81)  BP: 140/77 (29 May 2024 14:31) (121/58 - 162/69)  BP(mean): 102 (29 May 2024 14:31) (83 - 102)  RR: 21 (29 May 2024 14:31) (21 - 27)  SpO2: 92% (29 May 2024 14:31) (89% - 94%)    Parameters below as of 29 May 2024 14:31  Patient On (Oxygen Delivery Method): room air      PHYSICAL EXAM:    Constitutional: NAD, awake and alert  HEENT: PERR, EOMI, Normal Hearing, MMM  Neck: Soft and supple, No LAD, No JVD  Respiratory: Breath sounds are clear bilaterally, No wheezing, rales or rhonchi  Cardiovascular: S1 and S2, regular rate and rhythm, no Murmurs, gallops or rubs  Gastrointestinal: Bowel Sounds present, soft, nontender, nondistended, no guarding, no rebound  Extremities: No peripheral edema      MEDICATIONS:  MEDICATIONS  (STANDING):  acetaminophen     Tablet .. 650 milliGRAM(s) Oral every 6 hours  amLODIPine   Tablet 5 milliGRAM(s) Oral daily  cefTRIAXone   IVPB 1000 milliGRAM(s) IV Intermittent every 24 hours  chlorhexidine 2% Cloths 1 Application(s) Topical <User Schedule>  enoxaparin Injectable 30 milliGRAM(s) SubCutaneous every 12 hours  gabapentin 100 milliGRAM(s) Oral every 8 hours  hydrochlorothiazide 25 milliGRAM(s) Oral daily  insulin glargine Injectable (LANTUS) 10 Unit(s) SubCutaneous at bedtime  insulin lispro (ADMELOG) corrective regimen sliding scale   SubCutaneous Before meals and at bedtime  insulin lispro Injectable (ADMELOG) 7 Unit(s) SubCutaneous three times a day before meals  lidocaine   4% Patch 1 Patch Transdermal daily  methocarbamol 500 milliGRAM(s) Oral every 6 hours  metoprolol tartrate 50 milliGRAM(s) Oral every 12 hours  polyethylene glycol 3350 17 Gram(s) Oral every 12 hours  senna 2 Tablet(s) Oral at bedtime  simvastatin 40 milliGRAM(s) Oral at bedtime      LABS: All Labs Reviewed:                        10.5   5.39  )-----------( 177      ( 29 May 2024 00:39 )             34.0     05-29    137  |  101  |  19  ----------------------------<  190<H>  4.1   |  24  |  0.9    Ca    8.7      29 May 2024 00:39  Phos  4.5     05-29  Mg     2.2     05-29    TPro  7.0  /  Alb  4.1  /  TBili  0.3  /  DBili  x   /  AST  18  /  ALT  15  /  AlkPhos  121<H>  05-27    PT/INR - ( 27 May 2024 20:00 )   PT: 12.80 sec;   INR: 1.12 ratio         PTT - ( 27 May 2024 20:00 )  PTT:33.5 sec      Blood Culture: 05-28 @ 02:10  Organism --  Gram Stain Blood -- Gram Stain --  Specimen Source .Blood Blood  Culture-Blood --        Radiology: reviewed    
CC: 74F admit for Traumatic Pelvic Fractures    SUBJECTIVE / OVERNIGHT EVENTS:  No acute events overnight. Patient seen and evaluated at bedside. No fever/chills, no dysuria. No cp or sob    ROS:  no vomiting or diarrhea    MEDICATIONS  (STANDING):  acetaminophen     Tablet .. 650 milliGRAM(s) Oral every 6 hours  amLODIPine   Tablet 5 milliGRAM(s) Oral daily  chlorhexidine 2% Cloths 1 Application(s) Topical <User Schedule>  enoxaparin Injectable 30 milliGRAM(s) SubCutaneous every 12 hours  gabapentin 100 milliGRAM(s) Oral every 8 hours  hydrochlorothiazide 25 milliGRAM(s) Oral daily  insulin glargine Injectable (LANTUS) 10 Unit(s) SubCutaneous at bedtime  insulin lispro (ADMELOG) corrective regimen sliding scale   SubCutaneous Before meals and at bedtime  insulin lispro Injectable (ADMELOG) 4 Unit(s) SubCutaneous three times a day before meals  lidocaine   4% Patch 1 Patch Transdermal daily  magnesium hydroxide Suspension 30 milliLiter(s) Oral daily  metFORMIN 1000 milliGRAM(s) Oral two times a day  methocarbamol 500 milliGRAM(s) Oral every 6 hours  metoprolol tartrate 50 milliGRAM(s) Oral every 12 hours  polyethylene glycol 3350 17 Gram(s) Oral every 12 hours  senna 2 Tablet(s) Oral at bedtime  simvastatin 40 milliGRAM(s) Oral at bedtime    CAPILLARY BLOOD GLUCOSE  POCT Blood Glucose.: 185 mg/dL (01 Jun 2024 07:42)  POCT Blood Glucose.: 210 mg/dL (31 May 2024 20:47)  POCT Blood Glucose.: 193 mg/dL (31 May 2024 16:44)    I&O's Summary    31 May 2024 07:01  -  01 Jun 2024 07:00  --------------------------------------------------------  IN: 550 mL / OUT: 550 mL / NET: 0 mL    Physical Exam  Vital Signs Last 24 Hrs  T(C): 36.4 (01 Jun 2024 08:57), Max: 37 (31 May 2024 16:11)  T(F): 97.6 (01 Jun 2024 08:57), Max: 98.6 (31 May 2024 16:11)  HR: 65 (01 Jun 2024 08:57) (65 - 72)  BP: 148/65 (01 Jun 2024 08:57) (128/68 - 148/65)  RR: 12  SpO2: 96% (01 Jun 2024 08:57) (95% - 96%)    GENERAL: NAD  Lung: normal work of breathing, cta b/l  Cardiovascular: S1&S2+, rrr, no m/r/g appreciated  ABDOMEN: soft, nt  : No padilla catheter, no suprapubic pain to palpation  Neuro: Alert & follows commands, no flaccid paralysis in extremities appreciated  SKIN: warm and dry, no visible purulence in exposed areas    LABS:  I personally reviewed following labs                          10.5   5.63  )-----------( 247      ( 30 May 2024 20:43 )             32.8     05-30    135  |  95<L>  |  22<H>  ----------------------------<  181<H>  5.2<H>   |  28  |  0.9    Ca    9.0      30 May 2024 20:43  Phos  4.7     05-30  Mg     2.0     05-30      Urinalysis Basic - ( 30 May 2024 20:43 )  Color: x / Appearance: x / SG: x / pH: x  Gluc: 181 mg/dL / Ketone: x  / Bili: x / Urobili: x   Blood: x / Protein: x / Nitrite: x   Leuk Esterase: x / RBC: x / WBC x   Sq Epi: x / Non Sq Epi: x / Bacteria: x      
CC: 74F admit for Traumatic Pelvic Fractures    SUBJECTIVE / OVERNIGHT EVENTS:  No acute events overnight. Patient seen and evaluated at bedside. No fever/chills, no dysuria. No cp or sob    ROS:  no vomiting, reports continued constipation but is passing gas    MEDICATIONS  (STANDING):  acetaminophen     Tablet .. 650 milliGRAM(s) Oral every 6 hours  amLODIPine   Tablet 5 milliGRAM(s) Oral daily  chlorhexidine 2% Cloths 1 Application(s) Topical <User Schedule>  enoxaparin Injectable 30 milliGRAM(s) SubCutaneous every 12 hours  gabapentin 100 milliGRAM(s) Oral every 8 hours  hydrochlorothiazide 25 milliGRAM(s) Oral daily  insulin glargine Injectable (LANTUS) 10 Unit(s) SubCutaneous at bedtime  insulin lispro (ADMELOG) corrective regimen sliding scale   SubCutaneous Before meals and at bedtime  insulin lispro Injectable (ADMELOG) 4 Unit(s) SubCutaneous three times a day before meals  lidocaine   4% Patch 1 Patch Transdermal daily  metFORMIN 1000 milliGRAM(s) Oral two times a day  methocarbamol 500 milliGRAM(s) Oral every 6 hours  metoprolol tartrate 50 milliGRAM(s) Oral every 12 hours  polyethylene glycol 3350 17 Gram(s) Oral every 12 hours  senna 2 Tablet(s) Oral at bedtime  simvastatin 40 milliGRAM(s) Oral at bedtime    CAPILLARY BLOOD GLUCOSE  POCT Blood Glucose.: 225 mg/dL (31 May 2024 11:12)  POCT Blood Glucose.: 258 mg/dL (31 May 2024 08:26)  POCT Blood Glucose.: 211 mg/dL (31 May 2024 07:19)  POCT Blood Glucose.: 206 mg/dL (30 May 2024 21:29)  POCT Blood Glucose.: 199 mg/dL (30 May 2024 17:40)    Physical Exam  Vital Signs Last 24 Hrs  T(C): 36.9 (31 May 2024 08:25), Max: 37.1 (30 May 2024 17:01)  T(F): 98.4 (31 May 2024 08:25), Max: 98.7 (30 May 2024 17:01)  HR: 72 (31 May 2024 08:25) (72 - 75)  BP: 130/66 (31 May 2024 08:25) (130/66 - 173/70)  RR: 12  SpO2: 94% (31 May 2024 08:25) (94% - 99%)    GENERAL: NAD  Lung: normal work of breathing, cta b/l  Cardiovascular: S1&S2+, rrr, no m/r/g appreciated  ABDOMEN: soft, nt  : No padilla catheter, no suprapubic pain to palpation  Neuro: Alert & follows commands, no flaccid paralysis in extremities appreciated  SKIN: warm and dry, no visible purulence in exposed areas    LABS:  I personally reviewed following labs                          10.5   5.63  )-----------( 247      ( 30 May 2024 20:43 )             32.8     05-30    135  |  95<L>  |  22<H>  ----------------------------<  181<H>  5.2<H>   |  28  |  0.9    Ca    9.0      30 May 2024 20:43  Phos  4.7     05-30  Mg     2.0     05-30            Urinalysis Basic - ( 30 May 2024 20:43 )    Color: x / Appearance: x / SG: x / pH: x  Gluc: 181 mg/dL / Ketone: x  / Bili: x / Urobili: x   Blood: x / Protein: x / Nitrite: x   Leuk Esterase: x / RBC: x / WBC x   Sq Epi: x / Non Sq Epi: x / Bacteria: x          RADIOLOGY & ADDITIONAL TESTS:  Results Reviewed:   Imaging Personally Reviewed:  Electrocardiogram Personally Reviewed:    COORDINATION OF CARE:  Care Discussed with Consultants/Other Providers [Y/N]:  Prior or Outpatient Records Reviewed [Y/N]:  
CC: 74F admit for Traumatic Pelvic Fractures    SUBJECTIVE / OVERNIGHT EVENTS:  No acute events overnight. Patient seen and evaluated at bedside. No fever/chills, no dysuria. No cp or sob    ROS:  no vomiting, reports constipation but is passing gas    MEDICATIONS  (STANDING):  acetaminophen     Tablet .. 650 milliGRAM(s) Oral every 6 hours  amLODIPine   Tablet 5 milliGRAM(s) Oral daily  cefTRIAXone   IVPB 1000 milliGRAM(s) IV Intermittent every 24 hours  chlorhexidine 2% Cloths 1 Application(s) Topical <User Schedule>  enoxaparin Injectable 30 milliGRAM(s) SubCutaneous every 12 hours  gabapentin 100 milliGRAM(s) Oral every 8 hours  hydrochlorothiazide 25 milliGRAM(s) Oral daily  insulin glargine Injectable (LANTUS) 10 Unit(s) SubCutaneous at bedtime  insulin lispro (ADMELOG) corrective regimen sliding scale   SubCutaneous Before meals and at bedtime  insulin lispro Injectable (ADMELOG) 4 Unit(s) SubCutaneous three times a day before meals  lidocaine   4% Patch 1 Patch Transdermal daily  methocarbamol 500 milliGRAM(s) Oral every 6 hours  metoprolol tartrate 50 milliGRAM(s) Oral every 12 hours  polyethylene glycol 3350 17 Gram(s) Oral every 12 hours  senna 2 Tablet(s) Oral at bedtime  simvastatin 40 milliGRAM(s) Oral at bedtime    CAPILLARY BLOOD GLUCOSE  POCT Blood Glucose.: 194 mg/dL (30 May 2024 08:05)  POCT Blood Glucose.: 188 mg/dL (29 May 2024 21:36)  POCT Blood Glucose.: 191 mg/dL (29 May 2024 16:44)  POCT Blood Glucose.: 206 mg/dL (29 May 2024 11:15)    I&O's Summary  29 May 2024 07:01  -  30 May 2024 07:00  --------------------------------------------------------  IN: 100 mL / OUT: 550 mL / NET: -450 mL    Physical Exam  Vital Signs Last 24 Hrs  T(C): 36.9 (30 May 2024 09:59), Max: 37.3 (29 May 2024 12:00)  T(F): 98.4 (30 May 2024 09:59), Max: 99.2 (29 May 2024 12:00)  HR: 74 (30 May 2024 09:59) (74 - 84)  BP: 126/67 (30 May 2024 09:59) (124/69 - 170/74)  BP(mean): 102 (29 May 2024 14:31) (91 - 102)  RR: 12  SpO2: 98% (30 May 2024 09:59) (92% - 99%)    Parameters below as of 29 May 2024 14:31  Patient On (Oxygen Delivery Method): room air    GENERAL: NAD  Lung: normal work of breathing, cta b/l  Cardiovascular: S1&S2+, rrr, no m/r/g appreciated  ABDOMEN: soft, nt  : No padilla catheter, no suprapubic pain to palpation  Neuro: Alert & follows commands, no flaccid paralysis in extremities appreciated  SKIN: warm and dry, no visible purulence in exposed areas    LABS:  I personally reviewed following labs                          11.6   6.76  )-----------( 264      ( 29 May 2024 20:00 )             37.1     05-29    135  |  98  |  23<H>  ----------------------------<  177<H>  4.5   |  26  |  1.2    Ca    9.1      29 May 2024 20:00  Phos  4.5     05-29  Mg     2.1     05-29    Urinalysis Basic - ( 29 May 2024 20:00 )  Color: x / Appearance: x / SG: x / pH: x  Gluc: 177 mg/dL / Ketone: x  / Bili: x / Urobili: x   Blood: x / Protein: x / Nitrite: x   Leuk Esterase: x / RBC: x / WBC x   Sq Epi: x / Non Sq Epi: x / Bacteria: x    Culture - Blood (collected 28 May 2024 02:10)  Source: .Blood Blood  Preliminary Report (30 May 2024 09:01):    No growth at 48 Hours    Culture - Blood (collected 28 May 2024 02:10)  Source: .Blood Blood  Preliminary Report (30 May 2024 09:01):    No growth at 48 Hours    Urinalysis with Rflx Culture (collected 27 May 2024 22:39)        RADIOLOGY & ADDITIONAL TESTS:  Results Reviewed:   Imaging Personally Reviewed:  Electrocardiogram Personally Reviewed:    COORDINATION OF CARE:  Care Discussed with Consultants/Other Providers [Y/N]:  Prior or Outpatient Records Reviewed [Y/N]:  
CC: 74F admit for Traumatic Pelvic Fractures    SUBJECTIVE / OVERNIGHT EVENTS:  No acute events overnight. Patient seen and evaluated at bedside. No fever/chills, no dysuria. No cp or sob    ROS:  no vomiting or diarrhea    MEDICATIONS  (STANDING):  acetaminophen     Tablet .. 650 milliGRAM(s) Oral every 6 hours  amLODIPine   Tablet 5 milliGRAM(s) Oral daily  chlorhexidine 2% Cloths 1 Application(s) Topical <User Schedule>  enoxaparin Injectable 30 milliGRAM(s) SubCutaneous every 12 hours  gabapentin 100 milliGRAM(s) Oral every 8 hours  hydrochlorothiazide 25 milliGRAM(s) Oral daily  insulin glargine Injectable (LANTUS) 10 Unit(s) SubCutaneous at bedtime  insulin lispro (ADMELOG) corrective regimen sliding scale   SubCutaneous Before meals and at bedtime  insulin lispro Injectable (ADMELOG) 4 Unit(s) SubCutaneous three times a day before meals  lidocaine   4% Patch 1 Patch Transdermal daily  magnesium hydroxide Suspension 30 milliLiter(s) Oral daily  metFORMIN 1000 milliGRAM(s) Oral two times a day  methocarbamol 500 milliGRAM(s) Oral every 6 hours  metoprolol tartrate 50 milliGRAM(s) Oral every 12 hours  polyethylene glycol 3350 17 Gram(s) Oral every 12 hours  senna 2 Tablet(s) Oral at bedtime  simvastatin 40 milliGRAM(s) Oral at bedtime    MEDICATIONS  (PRN):      CAPILLARY BLOOD GLUCOSE  POCT Blood Glucose.: 191 mg/dL (03 Jun 2024 11:12)  POCT Blood Glucose.: 197 mg/dL (03 Jun 2024 07:53)  POCT Blood Glucose.: 147 mg/dL (02 Jun 2024 21:00)  POCT Blood Glucose.: 236 mg/dL (02 Jun 2024 16:44)    I&O's Summary    03 Jun 2024 07:01  -  03 Jun 2024 13:00  --------------------------------------------------------  IN: 1090 mL / OUT: 400 mL / NET: 690 mL    Physical Exam  Vital Signs Last 24 Hrs  T(C): 36.5 (03 Jun 2024 08:00), Max: 37 (02 Jun 2024 23:40)  T(F): 97.7 (03 Jun 2024 08:00), Max: 98.6 (02 Jun 2024 23:40)  HR: 68 (03 Jun 2024 08:00) (68 - 76)  BP: 121/63 (03 Jun 2024 08:00) (121/63 - 155/66)  RR: 12  SpO2: 95% (03 Jun 2024 08:00) (93% - 97%)    Parameters below as of 03 Jun 2024 08:00  Patient On (Oxygen Delivery Method): room air      GENERAL: NAD  Lung: normal work of breathing, cta b/l  Cardiovascular: S1&S2+, rrr, no m/r/g appreciated  ABDOMEN: soft, nt  : No padilla catheter, no suprapubic pain to palpation  Neuro: Alert & follows commands, no flaccid paralysis in extremities appreciated  SKIN: warm and dry, no visible purulence in exposed areas    LABS:  I personally reviewed following labs                        RADIOLOGY & ADDITIONAL TESTS:  Results Reviewed:   Imaging Personally Reviewed:  Electrocardiogram Personally Reviewed:    COORDINATION OF CARE:  Care Discussed with Consultants/Other Providers [Y/N]:  Prior or Outpatient Records Reviewed [Y/N]:

## 2024-06-03 NOTE — PROGRESS NOTE ADULT - ASSESSMENT
74yFemale w/ PMHx of HTN, HLD, DM, OA, h/o right hip surgery, seen as a trauma transfer from [facility] s/p mechanical fall (-HT, -LOC, -AC). The following injuries were identified:     # Right comminuted pubic rami fracture   # Right medial acetabular wall fracture   # Inferior right iliac fracture   # Periprosthetic femoral shaft fracture   # Chronic right 4-6 rib fractures     PLAN:   - Multimodal pain control w/ APAP 650 q6h, Gabapentin 100 q8h, Robaxin 500 q6h  - Diet/Fluids: DASH, IVL   - Wei out -- f/u TOV   - PPX: Lovenox 30 bid  - Ortho following --> no acute surgical intervention; f/u OP w/ Dr Beard in 2 weeks     #UTI on admission  - F/u urine cultures   - Ceftriaxone 1g qd x 3 days (5/29 - 5/31)     ACTIVITY:   Activity - Weight Bearing Status:     Left Lower Extremity:  Full Weight Bearing    Right Lower Extremity:  Toe Touch Weight Bearing  Activity - Ambulate with Assistance:     Time/Priority:  Routine    DISPO: Downgrade to 4C; possible 4A candidate (seen by PT/OT/Physiatry)   ------------------------------  Trauma Surgery  [id number]   Date/Time: 05-29-24 @ 12:21  
74yF w/ PMHx of HTN, HLD, DM, OA, right hip surgery seen as a trauma transfer from [facility] s/p mechanical fall today -HT, -LOC, -AC.   external signs of trauma include right externally rotated hip . Trauma assessment in ED: ABCs intact , GCS 15 , AAOx3,  FLORES.   Injuries identified:   - right comminuted pubic rami fx  - right medial acetabular wall fx  - inferior right iliac fx  - periprosthetic femoral shaft fx    - age indeterminate right 4-6 rib fxs. patient nontender on exam    #fall  appears to be mechanical . no syncope, palpitations or lightheadedness prior to fall.   plan as per trauma and orthopedics  PT OT. cleared by trauma/ortho  Plan for 4A soon?     # bladder injury"?  UTI?  Urinary bladder pneumatosis  follow urology recommendations  foleys in place   on ceftriaxone for total 3 days  UA positive patient asymptomatic   follow urine and blood cultures     #1.3 cm right thyroid nodule. 9 mm left thyroid nodule.  OP US and endocrinology follow up     #HTN  on HCTZ, metoprolol, amlodipine. BP at goal.   monitor BP     #DM  metformin, glipizide at home per patient.   Initially just SSI > 5/29: Primary team started Lantus 10 HS, Lispro 7 TID AC + SSI.   decrease the pre-meal to 4 units TID-AC. Patient will be getting a lot of coverage insulin also (high dose SSI chosen)   monitor glucose > On 5/30, reassess and adjust insulin doses.     hypomagnesemia  monitor  pending mag level    follow up: follow ortho, trauma, urology, monitor ,maqgnesium, BP and glucose levels, follow blood and urine cultures    Please call hospitalist team with any questions   
74F w/ HTN, DM2, OA, hx of Right Hip Surgery admitted to SICU(5/28) for Traumatic Pelvic Fracture, Sepsis(now resolved) 2/2 UTI(completed antibiotic course), and HTN Urgency (now resolved). Patient transferred to the floor on Trauma Service (5/29). Medicine consulted for comanagement    #Pelvic Fracture  #Chronic Rib Fracture  #HTN  #DM2  #Anemia  #Thyroid Nodules    - management of Fractures per Trauma service. PMR consulted and recommending 4A inpatient rehab however per chart not accepted by insurance now pending VALERIE for dispo  - Blood cultures NGTD, sepsis appears resolved. Urine culture collected and in lab. Completed 5d course of ceftriaxone  - SBP has been generally at inpatient goal <140. can c/w amlodipine, metoprolol, and HCTZ  - For Dm2: On basal-bolus insulin w/ corrective sliding scale. home dosing of metformin restarted by trauma service. Reported to be on metoformin/glipizide at home  - Microcytic Anemia present. Patient does not appear to have active hemorrhage on exam. Recommend obtaining ferritin and iron w/ TIBC to further evaluate  - Incidental finding of 1.3 cm right thyroid nodule & 9 mm left thyroid nodule. Recommend obtaining TSH/T4 levels. Can complete further workup as an outpatient w/ Thyroid US.
74yFemale w/ PMHx of HTN, HLD, DM, OA, h/o right hip surgery, seen as a trauma transfer from [facility] s/p mechanical fall (-HT, -LOC, -AC). The following injuries were identified:     # Right comminuted pubic rami fracture   # Right medial acetabular wall fracture   # Inferior right iliac fracture   # Periprosthetic femoral shaft fracture   # Chronic right 4-6 rib fractures     PLAN:   - Multimodal pain control w/ APAP 650 q6h, Gabapentin 100 q8h, Robaxin 500 q6h  - Diet/Fluids: DASH  -Voiding  - PPX: Lovenox 30 bid  - Ortho following --> no acute surgical intervention; f/u OP w/ Dr Beard in 2 weeks     #UTI on admission  - F/u urine cultures   - Completed Ceftriaxone 1g qd x 3 days (5/29 - 5/31)     ACTIVITY:   Activity - Weight Bearing Status:     Left Lower Extremity:  Full Weight Bearing    Right Lower Extremity:  Toe Touch Weight Bearing    
Imp: Rehab of multitrauma / s/p fall with right inferior pubic rami fracture, right periprosthetic femoral shaft fracture, right medial acetabular wall fracture, right inferior iliac fracture (just superior to right acetabular prosthesis), bladder injury / HTN, HLD, DM, OA s/p right ABIMBOLA.  Prior to hospitalization she  was independent in all activities. Currently she needs assist with all ADLs and ambulate short distance with walker with assist. She can tolerate 3hr/day PT/OT and medically necessary. She needs acute inpatient rehab to improve functions for safe return home. Pt also needs physiatrist to monitor her medical status and functional progress during her rehab stay. She warrants acute inpatient rehab.     Plan: continue bedside therapy as tolerated          Good 4a acute inpatient rehab candidate once medically cleared   
74yFemale w/ PMHx of HTN, HLD, DM, OA, h/o right hip surgery, seen as a trauma transfer from [facility] s/p mechanical fall (-HT, -LOC, -AC). The following injuries were identified:     # Right comminuted pubic rami fracture   # Right medial acetabular wall fracture   # Inferior right iliac fracture   # Periprosthetic femoral shaft fracture   # Chronic right 4-6 rib fractures     PLAN:   - Multimodal pain control w/ APAP 650 q6h, Gabapentin 100 q8h, Robaxin 500 q6h  - Diet/Fluids: DASH, IVL   -Voiding- passed TOV.   - PPX: Lovenox 30 bid  - Ortho following --> no acute surgical intervention; f/u OP w/ Dr Beard in 2 weeks     #UTI on admission  - F/u urine cultures   - Ceftriaxone 1g qd x 3 days (5/29 - 5/31)     ACTIVITY:   Activity - Weight Bearing Status:     Left Lower Extremity:  Full Weight Bearing    Right Lower Extremity:  Toe Touch Weight Bearing  Activity - Ambulate with Assistance:     Time/Priority:  Routine    DISPO: Downgrade to 4C; possible 4A candidate (seen by PT/OT/Physiatry)   ------------------------------  Trauma Surgery  [id number]   
ALLEN SCHULZ is a 74yFemale w/ PMHx of HTN, HLD, DM, OA, h/o right hip surgery, seen as a trauma transfer from [facility] s/p mechanical fall (-HT, -LOC, -AC). The following injuries were identified:     # Right comminuted pubic rami fracture   # Right medial acetabular wall fracture   # Inferior right iliac fracture   # Periprosthetic femoral shaft fracture   # Chronic right 4-6 rib fractures       PLAN:   - pt insurance denied placement in , Janeth sent by CM  - multi modal pain control  - monitor FS  - DVT ppx  - continue with DASH diet  - Ortho recs appreciated -  no acute surgical intervention; f/u OP w/ Dr Beard in 2 weeks   - F/u CM/SW for dispo planning   
ASSESSMENT AND PLAN:  74y Female s/p mechanical fall (-HT, -LOC, -AC)    Injuries:  - right inferior pubic rami fracture  - right periprosthetic femoral shaft fracture  - right medial acetabular wall fracture  - right inferior iliac fracture (just superior to right acetabular prosthesis)  - chronic right 4-6th rib fractures  - bladder injury (concern for posterior bladder rupture)    NEUROLOGICAL:  #Acute pain  - tylenol 650 q6  - gabapentin 100 q8  - robaxin 500 q6    RESPIRATORY:   - on room air  - encourage IS 10 times per hour  #Activity    -increase as tolerated  #Chronic right 4-6th rib fractures    CARDIOVASCULAR:   #Lactic Acidosis likely secondary to hypovolemia    - trend lactate 3.2 --> 2.3 --> 2.1  #Hypertensive urgency  - added amlodipine 5 QD  #Hx of HTN  - continue home metoprolol 50 q12  - continue home HCTZ 25 qd  #HLD  - simvastatin 40 qd (therapeutic interchange for ezetimibe/simvastatin combo that she takes at home)    GASTROINTESTINAL/NUTRITION:   #Diet, DASH diet    -aspiration precautions, HOB 30  #GI Prophylaxis    -not indicated  #Bowel regimen  - senna      /RENAL:   #urine output in critically ill    - IVL    -indwelling padilla (placed 5/27)    #Bladder injury (suspected posterior bladder wall rupture)  - urology following  - CT cystogram: prelim negative for extravasation of contrast         HEME/ONC:   #DVT prophylaxis     -Chemical: enoxaparin Injectable 30 milliGRAM(s) SubCutaneous every 12 hours     -Mechanical: SCDs  T&S Expires: 5/30    ID:    #UTI on admission / Emphysematous Cystitis   Current antibiotics- cefTRIAXone   IVPB 1000 every 24 hours x 3 days (5/29 - 5/31)  - Follow up Cultures    ENDOCRINOLOGY:  #DM  -Glucose goal 140-180  - FS ACHS  - ISS  - added lantus 10 units  - added preprandial 5 units  - A1c 8.4 (5/28)  - holding home metformin    MSK:  #right inferior pubic rami fracture  #right periprosthetic femoral shaft fracture  #right medial acetabular wall fracture  #right inferior iliac fracture (just superior to right acetabular prosthesis)  - ortho following  - no acute surgical intervention  - f/u outpatient with Dr. Beard in 2 weeks   Activity - Weight Bearing Status:     Left Lower Extremity:  Full Weight Bearing    Right Lower Extremity:  Toe Touch Weight Bearing (05-28-24 @ 02:34)  Activity - Ambulate with Assistance:     Time/Priority:  Routine (05-28-24 @ 02:34)  -F/u OT c/s      LINES/DRAINS:  Eriberto Louie (5/27 - )    ADVANCED DIRECTIVES:  Full Code    HCP/Emergency Contact-    INDICATION FOR SDU: Fracture of one rib, unspecified side, initial encounter for closed fracture, respiratory and hemodynamic monitoring      DISPO: SDU  
ASSESSMENT:  74yFemale w/ PMHx of HTN, HLD, DM, OA, h/o right hip surgery, seen as a trauma transfer from [facility] s/p mechanical fall (-HT, -LOC, -AC). The following injuries were identified:     # Right comminuted pubic rami fracture   # Right medial acetabular wall fracture   # Inferior right iliac fracture   # Periprosthetic femoral shaft fracture   # Chronic right 4-6 rib fractures     PLAN:   - F/U 4A  - Multimodal pain control w/ APAP 650 q6h, Gabapentin 100 q8h, Robaxin 500 q6h  - Diet/Fluids: DASH  - Voiding  - PPX: Lovenox 30 bid  - Ortho following --> no acute surgical intervention; f/u OP w/ Dr Beard in 2 weeks     #UTI on admission  - F/u urine cultures   - Completed Ceftriaxone 1g qd x 3 days (5/29 - 5/31)     ACTIVITY:   Activity - Weight Bearing Status:     Left Lower Extremity: Full Weight Bearing    Right Lower Extremity: Toe Touch Weight Bearing
74F w/ HTN, DM2, OA, hx of Right Hip Surgery admitted to SICU(5/28) for Traumatic Pelvic Fracture, Sepsis(now resolved) 2/2 UTI, and HTN Urgency (now resolved). Patient transferred to the floor on Trauma Service (5/29). Medicine consulted for comanagement    #Pelvic Fracture  #Chronic Rib Fracure  #Acute Cystitis  #HTN  #DM2  #Anemia  #Thyroid Nodules    - management of Fractures per Trauma service. PMR consulted and recommending 4A inpatient rehab  - Blood cultures NGTD, sepsis appears resolved. Urine culture collected and in lab. If treating empirically with Cephalosporin, patient will require 5 days of antibiotics. Recommending continuing Ceftriaxone through 5/31.  - SBP at inpatient goal <140. can c/w amlodipine, metoprolol, and HCTZ  - c/w ISS for DM2 while inpatient. Reported to be on metoformin/glipizide at home  - Microcytic Anemia present. Patient does not appear to have active hemorrhage on exam. Recommend obtaining ferritin and iron w/ TIBC to further evaluate  - Incidental finding of 1.3 cm right thyroid nodule & 9 mm left thyroid nodule. Recommend obtaining TSH/T4 levels. Can complete further workup as an outpatient w/ Thyroid US.        
74F w/ HTN, DM2, OA, hx of Right Hip Surgery admitted to SICU(5/28) for Traumatic Pelvic Fracture, Sepsis(now resolved) 2/2 UTI(completed antibiotic course), and HTN Urgency (now resolved). Patient transferred to the floor on Trauma Service (5/29). Medicine consulted for comanagement    #Pelvic Fracture  #Chronic Rib Fracture  # Periprosthetic femoral shaft fracture   #HTN  #DM2  #Anemia  #Thyroid Nodules    - management of Fractures per Trauma service. PMR consulted and recommending 4A inpatient rehab however per chart not accepted by insurance now pending VALERIE for dispo  - Blood cultures NGTD, sepsis appears resolved. Urine culture collected and in lab. Completed 5d course of ceftriaxone  - SBP has been generally at inpatient goal <140. can c/w amlodipine, metoprolol, and HCTZ  - For Dm2: On basal-bolus insulin w/ corrective sliding scale. Increase Lantus to 14U at bedtime.  On Metformin 1000mg BID. Reported to be on metoformin/glipizide at home  - Microcytic Anemia present. Patient does not appear to have active hemorrhage on exam. Consider obtaining ferritin, iron w/ TIBC, reticulocyte count to further evaluate  - Incidental finding of 1.3 cm right thyroid nodule & 9 mm left thyroid nodule. Recommend obtaining TSH/T4 levels. Can complete further workup as an outpatient w/ Thyroid US.
74F w/ HTN, DM2, OA, hx of Right Hip Surgery admitted to SICU(5/28) for Traumatic Pelvic Fracture, Sepsis(now resolved) 2/2 UTI, and HTN Urgency (now resolved). Patient transferred to the floor on Trauma Service (5/29). Medicine consulted for comanagement    #Pelvic Fracture  #Chronic Rib Fracture  #Acute Cystitis  #HTN  #DM2  #Anemia  #Thyroid Nodules    - management of Fractures per Trauma service. PMR consulted and recommending 4A inpatient rehab  - Blood cultures NGTD, sepsis appears resolved. Urine culture collected and in lab. Completed 5d course of ceftriaxone  - SBP at inpatient goal <140. can c/w amlodipine, metoprolol, and HCTZ  - For Dm2: Increase Lantus to 15U at bedtime and Admelog to 6U w/ corrective sliding scale  . Reported to be on metoformin/glipizide at home  - Microcytic Anemia present. Patient does not appear to have active hemorrhage on exam. Recommend obtaining ferritin and iron w/ TIBC to further evaluate  - Incidental finding of 1.3 cm right thyroid nodule & 9 mm left thyroid nodule. Recommend obtaining TSH/T4 levels. Can complete further workup as an outpatient w/ Thyroid US.

## 2024-06-03 NOTE — DISCHARGE NOTE NURSING/CASE MANAGEMENT/SOCIAL WORK - NSDCPEFALRISK_GEN_ALL_CORE
For information on Fall & Injury Prevention, visit: [email or web address] OR  [email or web address] OR  https://www.cdc.gov/steadi/patient.html

## 2024-06-03 NOTE — DISCHARGE NOTE NURSING/CASE MANAGEMENT/SOCIAL WORK - PATIENT PORTAL LINK FT
You can access the FollowMyHealth Patient Portal offered by [facility] by registering at the following website: [email or web address] By joining [email or web address] FollowMyHealth portal, you will also be able to view your health information using other applications (apps) compatible with our system.

## 2024-06-03 NOTE — PROGRESS NOTE ADULT - REASON FOR ADMISSION
Infectious Disease Progress Note    Author: Karen Garcia M.D. Date & Time of service: 7/28/2019  10:19 AM    Chief Complaint:  Brain abscess, gluteal abscess  Vertebral OM     Interval History:  72 y.o. female admitted 5/29/2019 as a transfer from [facility] since 4/30/2019. + diabetes, SANTOSH of right hip with hardware, and breast cancer who was originally admitted to [facility] on 03/08/2019 for right hip and pelvic pain.  Work-up revealed a right iliopsoas lesion, gluteal abscess, and mult brain abscesses     7/4 AF much more alert and conversant today-c/o left hip pain, unrelenting and would like parietal dressing changed. HA at surgical site.  Denies SE abx. No new neurodeficits  7/5 afebrile WBC 5.4.  Patient sleeping but arousable.  She denies any headache, nausea, vomiting.  7/6 afebrile WBC 6.5.  Patient sitting up in chair and having excruciating back pain  7/8 Pt has no specific complaints, she is mildly confused today.  She seems to be tolerating her medications with no significant lab abnormalities.  7/9 AF, O2 RA, Significant left hip pain today.  She does appear to be tolerating her antibiotics.  Ultrasound of bilateral lower extremities has been ordered.  7/10 AF, O2 RA,  Plan for IR drainage of sacral collection soon.   7/11 AF, O2 RA,  Pt continued on RIPE and ampho.  She is tolerating well overall, requiring some mag and potassium replacement.  She is complaining of severe pain in left hip again today.  Plan for biopsy later today.   7/12  IR biopsy of R gluteal abscess/hip. AF, O2 2 L NC,   tolerating antibiotics so far.  She is quite somnolent today but per nursing she was oriented x4 earlier  7/14 AF, O2 RA, more alert today, conversant and asking for advance in diet. Left hip pain ongoing but improved.   7/15 afebrile WBC 4.4.  Patient's speech is somewhat muffled but she is alert and responding to questions appropriately.  MRI shows worsening lesions and amphotericin stopped.  Nurse reports 
PELVIC FRACTURES S/P FALL
74F admit for Traumatic Pelvic Fractures
PELVIC FRACTURES S/P FALL
waxing and waning mental status  7/16 afebrile.  Patient sleeping but arousable.  She is answer questions appropriately but then forgets that she is in the hospital.  7/17 afebrile WBC 4.9 patient is very drowsy as she states she did not sleep well yesterday.  She is asking about whether or not she has a fungal infection.  Plan of care discussed with patient.  7/19 afebrile patient continues to only endorse left hip pain exacerbated by sitting up in the chair.  She sat in the chair for 30 minutes yesterday.  Getting out of bed is limited secondary to left hip pain.  Mental status remains about the same with intermittent waxing and waning.  No new issues today per RN  7/21 afebrile patient is much more awake and alert today.  She is answering questions appropriately.  Yesterday she was complaining of some abdominal pain so CT scan was done and revealed interval enlargement of a hyperdense ovoid masslike structure posterior to the right ilium concerning for pseudoaneurysm.  7/22/2019 no fevers.  No new issues overnight.  Had her ultrasound today follow the results  7/23/2019-no fevers.  Ongoing pain.  The AFB cultures have come back positive for TB  7/24/2019 no fevers.  No new issues overnight.  Very anxious and agitated  7/25/2019 no fevers.  No new issues.  Says she feels slightly better.  7/26 AF WBC 4.5 tolerating meds well-wants cream to decrease size of surgical scar-has right-sided rib pain and joint pain  7/27 AF ambulating with assist with walker-no new complaints  7/28 AF same pain complaint-remains debilitated with intermittent confusion    Review of Systems:  Review of Systems   Constitutional: Negative for chills, fever and malaise/fatigue.   Respiratory: Negative for shortness of breath.    Cardiovascular:        Right rib pain   Gastrointestinal: Negative for abdominal pain, nausea and vomiting.   Musculoskeletal: Positive for joint pain.   Neurological: Negative for dizziness and headaches. 
      Hemodynamics:  Temp (24hrs), Av.7 °C (98 °F), Min:36.3 °C (97.3 °F), Max:36.9 °C (98.5 °F)  Temperature: 36.3 °C (97.3 °F)  Pulse  Av  Min: 49  Max: 195   Blood Pressure : 129/55       Physical Exam:  Physical Exam   Constitutional: No distress.   Elderly  Chronically ill-appearing   HENT:   Mouth/Throat: Oropharynx is clear and moist. No oropharyngeal exudate.   Right parietal surgical site healed   Eyes: Pupils are equal, round, and reactive to light. Conjunctivae and EOM are normal.   Neck: Neck supple. No JVD present.   Cardiovascular: Normal rate and regular rhythm.    Pulmonary/Chest: Effort normal. No stridor. No respiratory distress. She has no wheezes.   Abdominal: Soft. She exhibits no distension. There is no tenderness.   Musculoskeletal: She exhibits no edema.   Neurological: She is alert. No cranial nerve deficit.   Skin: Skin is warm. She is not diaphoretic.   Nursing note and vitals reviewed.      Meds:    Current Facility-Administered Medications:   •  levoFLOXacin  •  acetaminophen  •  hyoscyamine-maalox plus-lidocaine viscous  •  riFAMPin  •  thrombin  •  oxyCODONE immediate-release  •  oxyCODONE CR  •  temazepam  •  potassium chloride SA  •  losartan  •  pramipexole  •  senna-docusate  •  polyethylene glycol/lytes  •  magnesium hydroxide  •  gabapentin  •  ferrous sulfate  •  diphenhydrAMINE  •  heparin  •  pyridoxine  •  levETIRAcetam  •  isoniazid  •  ethambutol  •  pyrazinamide  •  Pharmacy Consult Request  •  labetalol  •  hydrALAZINE  •  lactobacillus rhamnosus  •  sucralfate  •  lidocaine  •  cyclobenzaprine  •  Respiratory Care per Protocol  •  amLODIPine  •  cinacalcet  •  atorvastatin  •  omeprazole  •  metoprolol  •  ondansetron  •  ondansetron    Labs:  Recent Labs      19   0301   WBC  4.5*   RBC  3.71*   HEMOGLOBIN  11.1*   HEMATOCRIT  34.3*   MCV  92.5   MCH  29.9   RDW  52.9*   PLATELETCT  244   MPV  9.1   NEUTSPOLYS  50.20   LYMPHOCYTES  29.60   MONOCYTES  
PELVIC FRACTURES S/P FALL
13.20   EOSINOPHILS  6.10   BASOPHILS  0.70     No results for input(s): SODIUM, POTASSIUM, CHLORIDE, CO2, GLUCOSE, BUN, CPKTOTAL in the last 72 hours.  No results for input(s): ALBUMIN, TBILIRUBIN, ALKPHOSPHAT, TOTPROTEIN, ALTSGPT, ASTSGOT, CREATININE in the last 72 hours.    Imaging:  MRI on 7/14/2019  Impression:       1.  Innumerable nodular enhancing lesions throughout the brain parenchyma both the supra and infratentorial compartments predominantly near the gray-white junction. There has been interval increase in size of several of these lesions since previous exam.   These changes may be secondary to metastatic disease or granulomatous disease.    2.  Interval right parietal craniotomy with underlying elliptical postsurgical defect.    3.  Interval increase in size of index right thalamic lesion which has increased vasogenic edema since previous exam.    4.  Age-related cerebral atrophy.       Micro:  Results     Procedure Component Value Units Date/Time    Anaerobic Culture [[id number]] Collected:  07/11/19 1745    Order Status:  Completed Specimen:  Tissue Updated:  07/26/19 1017     Significant Indicator NEG     Source TISS     Site Right Hip Cores     Culture Result No Anaerobes isolated.    Narrative:       Radiology specimen Hold specimen 14 days per Dr. Junior    CULTURE TISSUE W/ GRM STAIN [[id number]] Collected:  07/11/19 1745    Order Status:  Completed Specimen:  Tissue Updated:  07/26/19 1017     Significant Indicator NEG     Source TISS     Site Right Hip Cores     Culture Result No growth at 14 days.     Gram Stain Result No organisms seen.    Narrative:       Radiology specimen Hold specimen 14 days per Dr. Junior    Fungal Culture [[id number]] Collected:  06/28/19 1403    Order Status:  Completed Specimen:  Tissue Updated:  07/23/19 0848     Significant Indicator NEG     Source TISS     Site Right Parietal Lesion     Culture Result No fungal growth.    Narrative:       Surgery Specimen    
Anaerobic Culture [[id number]] Collected:  06/28/19 1403    Order Status:  Completed Specimen:  Tissue Updated:  07/23/19 0848     Significant Indicator NEG     Source TISS     Site Right Parietal Lesion     Culture Result No Anaerobes isolated.    Narrative:       Surgery Specimen    CULTURE TISSUE W/ GRM STAIN [[id number]] Collected:  06/28/19 1403    Order Status:  Completed Specimen:  Tissue Updated:  07/23/19 0848     Significant Indicator NEG     Source TISS     Site Right Parietal Lesion     Culture Result No growth at 72 hours.     Gram Stain Result Rare WBCs.  No organisms seen.      Narrative:       Surgery Specimen    AFB Culture [[id number]] Collected:  06/28/19 1403    Order Status:  Completed Specimen:  Tissue Updated:  07/23/19 0848     Significant Indicator NEG     Source TISS     Site Right Parietal Lesion     Culture Result Culture in progress.     AFB Smear Results No acid fast bacilli seen.    Narrative:       Surgery Specimen    Fungal Culture [[id number]] Collected:  06/19/19 0930    Order Status:  Completed Specimen:  Tissue Updated:  07/22/19 1949     Significant Indicator NEG     Source TISS     Site Right Hip deep bone     Culture Result No fungal growth.    Narrative:       CALL  [facility]  TEREZA tel. [phone],  Collected By:[id number] BEATRICE HARRIS  Bone biopsy right hip  Collected By:[id number] BEATRICE HARRIS  Right hip fluid collection  Collected By:[id number] BEATRICE HARRIS    AFB Culture [[id number]]  (Abnormal) Collected:  06/19/19 0930    Order Status:  Completed Specimen:  Tissue from Other Body Fluid Updated:  07/22/19 1949     Significant Indicator POS (POS)     Source TISS     Site Right Hip deep bone     Culture Result Acid fast bacilli detected by MGIT 960 instrument.  Identification to follow.   (A)     AFB Smear Results No acid fast bacilli seen.     Culture Result Mycobacterium tuberculosis complex  By DNA probe  Positive for M. tb complex  Negative for M. avium complex  Negative for M. 
"gordonae  Negative for M. kansasii  Testing performed at:  [facility]  [address]  [place] [phone]   (A)    Narrative:       CALL  [facility]  TEREZA tel. [phone],  Collected By:[id number] BEATRICE HARRIS  Bone biopsy right hip  Collected By:[id number] BEATRICE HARRIS  Right hip fluid collection  Collected By:[id number] BEATRICE HARRIS    Fungal Smear [[id number]] Collected:  06/19/19 0930    Order Status:  Completed Specimen:  Tissue from Other Body Fluid Updated:  07/22/19 1949     Significant Indicator NEG     Source TISS     Site Right Hip deep bone     Fungal Smear Results No fungal elements seen.    Narrative:       CALL  [facility]  TEREZA tel. [phone],  Collected By:[id number] BEATRICE HARRIS  Bone biopsy right hip  Collected By:[id number] BEATRICE HARRIS  Right hip fluid collection  Collected By:[id number] BEATRICE HARRIS    CULTURE TISSUE W/ GRM STAIN [[id number]] Collected:  06/19/19 0930    Order Status:  Completed Specimen:  Tissue Updated:  07/22/19 1949     Significant Indicator NEG     Source TISS     Site Right Hip deep bone     Culture Result No growth at 72 hours.     Gram Stain Result No organisms seen.    Narrative:       CALL  [facility]  TEREZA tel. [phone],  Collected By:[id number] BEATRICE HARRIS  Bone biopsy right hip  Collected By:[id number] BEATRICE HARRIS  Right hip fluid collection  Collected By:[id number] BEATRICE HARRIS          Assessment:  Active Hospital Problems    Diagnosis   • *Brain lesion [G93.9]   • Granulomatous disease  [L92.9]   • Abscess of right iliac muscle and right gluteus medius muscle [L02.91]   • Pseudoaneurysm following procedure (HCC) [I97.89, I72.9]   • Sepsis ([facility]) [A41.9]   • History of breast cancer [Z85.3]       Assessment/Plan:   Brain lesions  Tubercular  Encephalopathy, intermittent waxing and waning  MRI 4/23 \"supra and infratentorial brain parenchyma. Decrease in the size of the lesions. Interval reduction in the extent of the surrounding white matter edema "
"consistent with improvement/treatment response of the most of the multifocal brain abscesses.  MRI 5/21 showed innumerable microabscesses vs mets  MRI on 6/8 with interval progression of supra and infratentorial intra--axial nodules and associated edema.  New right thalamus lesion. Radiology favors infection over neoplasm though both remain considerations (had been off abx)  Mult blood cultures neg  CHAS neg 3/15 and 5/24  MRI 6/22 worsening CNS lesions  S/p right craniotomy and resection of mass with biopsy on 6/28. Biopsy-per note fungal appearing elements seen per Neurosurgery-path   +necrotizing granulomas. 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 The collection within the right gluteal muscle has unchanged while the fluid collection within the right iliacis muscle is increased somewhat in size.\" 2.7 cm  Cultures 3/29 and 4/4 neg  MRI on 5/20/2019 - interval decrease in collection in R gluteal muscle and persistent multiloculated collection in R iliacus muscle.   CT 5/28 no change  s/p drainage on 5/30/2019-cultures negative  S/p removal hardware 6/5-no cultures done  CT on 6/8 with residual abscess in the right iliacus muscle, 2.5 x 1.8 cm, slightly smaller than prior  s/p CT-guided deep bone biopsy 6/19/2019.  Cx +Mtb  MRI 6/28 chronic discitis, diffuse OM L5, 3 and 4 cm abscesses right glute, 1.5 "
cm abscess or necrosis right posterior ileum, 3.3 cm abscess right SI joint  Continue TB therapy as above with levofloxacin per [facility]     +QuantiGold  Path with necrotizing granulomas  AFB stain neg  AFB cultures + 6/19  Started RIPE +B6 7/3  Monitor for visual changes while on ethambutol.    Ophthalmology evaluation for baseline vision as well as color vision testing when able     Type 2 DM, chronic  Hemoglobin A1c 6.3   Keep BS under 150 to help control current infection    I have performed a physical exam and reviewed and updated ROS as of today.  In review of yesterday's note dated  7/27/2019, there are no changes except as documented above.          Will follow up at [facility] for meds and case management after discharge    Discussed with IM Dr Sorensen  
74F admit for Traumatic Pelvic Fractures
PELVIC FRACTURES S/P FALL
74F admit for Traumatic Pelvic Fractures

## 2024-06-03 NOTE — DISCHARGE NOTE PROVIDER - CARE PROVIDER_API CALL
Mike Veras  Orthopaedic Surgery  [address] [place] [address]  Phone: [phone]  Fax: [phone]  Follow Up Time: 1 week

## 2024-06-03 NOTE — PROGRESS NOTE ADULT - PROBLEM SELECTOR PROBLEM 4
DM2 (diabetes mellitus, type 2)
HTN (hypertension)
DM2 (diabetes mellitus, type 2)
DM2 (diabetes mellitus, type 2)

## 2024-06-03 NOTE — DISCHARGE NOTE PROVIDER - NSDCCPCAREPLAN_GEN_ALL_CORE_FT
PRINCIPAL DISCHARGE DIAGNOSIS  Diagnosis: Fracture, rib  Assessment and Plan of Treatment: Use incentive spirometer 10x/hr for at least 1 week.   Take ibuprofen, tylenol around the clock for at least 5 days.  Continue with lidocaine patch for pain as needed.        SECONDARY DISCHARGE DIAGNOSES  Diagnosis: Fractured pelvis  Assessment and Plan of Treatment: TTWB RLE with walker   - Pain control  - SCDs bilaterally + chemical ppx per primary; can dc on 6 weeks course of ASA 81 BID  Upon discharge, please have patient follow up in 2 weeks with Dr. Beard at [address] [place] [address]    Diagnosis: Acute UTI  Assessment and Plan of Treatment: Completed course of antibiotics    Diagnosis: Hypertension  Assessment and Plan of Treatment: Amlodipine added to regimen, follow up with your PCP for further management,    Diagnosis: Diabetes  Assessment and Plan of Treatment: You required daily insulin while hospitalized- please follow up with your pcp for further management,     PRINCIPAL DISCHARGE DIAGNOSIS  Diagnosis: Fracture, rib  Assessment and Plan of Treatment: Use incentive spirometer 10x/hr for at least 1 week.   Take ibuprofen, tylenol around the clock for at least 5 days.  Continue with lidocaine patch for pain as needed.        SECONDARY DISCHARGE DIAGNOSES  Diagnosis: Fractured pelvis  Assessment and Plan of Treatment: TTWB RLE with walker   - Pain control  - SCDs bilaterally + chemical ppx per primary; can dc on 6 weeks course of ASA 81 BID  Upon discharge, please have patient follow up in 2 weeks with Dr. Beard at [address] [place] [address]    Diagnosis: Acute UTI  Assessment and Plan of Treatment: Completed course of antibiotics    Diagnosis: Hypertension  Assessment and Plan of Treatment: Amlodipine added to regimen, follow up with your PCP for further management,    Diagnosis: Diabetes  Assessment and Plan of Treatment: You required daily insulin while hospitalized- please follow up with your pcp for further management,    Diagnosis: Thyroid nodule  Assessment and Plan of Treatment: Follow up with your PCP for further management

## 2024-06-03 NOTE — PROGRESS NOTE ADULT - PROVIDER SPECIALTY LIST ADULT
Hospitalist
Internal Medicine
Physiatry
SICU
SICU
Trauma Surgery
Hospitalist

## 2024-06-06 NOTE — CDI QUERY NOTE - NSCDI_DOCCLARIFY2_GEN_ALL_CORE_FT_PREVIEWDISPLAY
In responding to this request, please exercise your independent professional judgment. The fact that a question is asked does not imply that any particular answer is desired or expected. Documentation clarification is required for compliance.   This form is NOT a part of the permanent Medical Record.
In responding to this request, please exercise your independent professional judgment. The fact that a question is asked does not imply that any particular answer is desired or expected. Documentation clarification is required for compliance.   This form is NOT a part of the permanent Medical Record.
No

## 2024-06-06 NOTE — CDI QUERY NOTE - NSCDIOTHERTXTBX_GEN_ALL_CORE_HH
DOCUMENTATION CLARIFICATION FORM   Encounter #: [id number]                    Patient’s Name: ALLEN SCHULZ  Medical Record #: [id number]                 Admit Date: 2024  : 1950                                      Discharged: 6-3-2024  [facility] Specialist/: Kacie                  Contact #: [phone]    Dear Dr. Michael,                       Date:2024     Clinical documentation and/or evidence of the patient’s presentation, evaluation, and medical management, as evidenced below, may support a diagnosis that is not documented in the medical record.  In order to ensure accurate coding and accuracy of the clinical record, the documentation in this patient’s medical record requires additional clarification.      If you think the supporting documentation and/or clinical evidence supports a more specific diagnosis, please include more specific documentation of a diagnosis associated with these findings in your progress note and/or discharge summary.    Please clarify if the right pubic rami fracture, right acetabular, and right inferior iliac fracture due to trauma from a standing height with osteopenia seen on imaging can be further specified as:   •	Right pubic rami fracture, right acetabular, and right inferior iliac fracture due to trauma are fragility fractures associated with osteopenia  •	Right pubic rami fracture, right acetabular, and right inferior iliac fracture due to trauma are pathological fractures associated with osteopenia  •	Right pubic rami fracture, right acetabular, and right inferior iliac fracture due to trauma unrelated to osteopenia  •	Other (specify)    Supporting documentation and/or clinical evidence:   •	 Trauma Consult… 74F…chief complaint of ground level fall and sustained … I independently read and reviewed the above studies -> multiple right sided pelvic fractures, periprosthetic femur fracture  •	 Orthopedic Consult… Right AC/PW PP Acetabular fx, likely old right inferior rami fx s/p mechanical fall from standing height… No acute orthopedic intervention at this time  •	6-3 Trauma… right hip periprosthetic fracture due to trauma… right pubic rami fracture due to trauma… right acetabular… wall fracture due to trauma… right inferior iliac fracture due to trauma  •	6-3 DC Summary… Diagnosis: Fractured pelvis      Imaging  •	 CT Pelvis Radiology Finding… Bones are osteopenic. There is a comminuted fracture of the right pubic rami. Comminuted fractures also seen involving the medial acetabular wall. Comminuted fractures are also noted of the inferior right iliac bone just superior to the right acetabular prosthesis. There is a periprosthetic fracture laterally of the right femoral shaft at the mid aspect of the prosthesis.
DOCUMENTATION CLARIFICATION FORM     Encounter #: [id number]                    Patient’s Name: ALLEN SCHULZ  Medical Record #: [id number]                 Admit Date: 2024  : 1950                                     Discharged: 6-3-2024  CDI Specialist/: Kacie                 Contact #: [phone]    Dear Dr. Cali,                                                        Date:2024               Although sepsis is documented in the medical record, it lacks the specific indicators to clinically substantiate and support the diagnosis.     In order to ensure accurate coding and accuracy of the clinical record, the documentation in this patient’s medical record requires additional clarification of the diagnosis.     Please clarify the status of sepsis in your progress note and/or discharge summary:  •	Sepsis was evaluated and ruled out after study  •	Sepsis was evaluated ruled in (please document additional supporting information or mitigating factors to support this diagnosis)  •	Other (specify)    Supporting documentation:   •	 ED…  presents to the ED s/p fall c/o right hip pain and difficulty weight bearing….multiple pelvic fx but also suspected posterior bladder rupture…  brought to obtain rest of CT scans including cystogram…. sepsis protocol initiated, pt admitted to surgical step down unit signed out to trauma team.  •	 Urolody Consult…CT Cystogram shows no evidence of bladder rupture. More consistent with emphysematous cystitis. Continue Wei catheter to decompress bladder and continue IV Abx as per ID.  •	 Medicine Consult… bladder injury"? UTI? Urinary bladder pneumatosis… follow urology recommendations… foleys in place… on ceftriqaxone… UA positive patient asymptomatic … follow urince and blood cultures   •	 Medicine…on ceftriaxone for total 3 days… UA positive patient asymptomatic  •	Medicine…Acute Cystitis… Blood cultures NGTD, sepsis appears resolved. Urine culture collected and in lab. If treating empirically with Cephalosporin, patient will require 5 days of antibiotics. Recommending continuing Ceftriaxone through .  •	6-3 Medicine…Sepsis (now resolved) 2/2 UTI (completed antibiotic course),  •	6-3 DC Summary… Afebrile on Ceftriaxone due to UTI…. Diagnosis: Acute UTI      Labs/VS  •	 WBCs 6.81 > 6.83  •	 Lactate Level 3.1 > 2.3 2.1 > 1.5  •	 UA Leukocytes: Large Concentration Nitrate: Positive WBCs: >998 Bacteria Many  •	No Urine Culture noted  •	 Blood Culture x 2 Bottles: No growth  •	 16:11 T 98 HR 81 /78 RR 18  •	 21:01 T 98.6 HR 95 /80 RR 18

## 2024-06-18 ENCOUNTER — APPOINTMENT (OUTPATIENT)
Dept: ORTHOPEDIC SURGERY | Facility: ASSISTED LIVING FACILITY | Age: 74
End: 2024-06-18
Payer: MEDICARE

## 2024-06-18 DIAGNOSIS — S32.591A OTHER SPECIFIED FRACTURE OF RIGHT PUBIS, INITIAL ENCOUNTER FOR CLOSED FRACTURE: ICD-10-CM

## 2024-06-18 PROBLEM — Z00.00 ENCOUNTER FOR PREVENTIVE HEALTH EXAMINATION: Status: ACTIVE | Noted: 2024-06-18

## 2024-06-18 PROCEDURE — 99307 SBSQ NF CARE SF MDM 10: CPT

## 2024-08-02 ENCOUNTER — APPOINTMENT (OUTPATIENT)
Dept: ORTHOPEDIC SURGERY | Facility: CLINIC | Age: 74
End: 2024-08-02

## 2024-09-08 ENCOUNTER — INPATIENT (INPATIENT)
Facility: HOSPITAL | Age: 74
LOS: 4 days | Discharge: HOME | DRG: 522 | End: 2024-09-13
Attending: STUDENT IN AN ORGANIZED HEALTH CARE EDUCATION/TRAINING PROGRAM | Admitting: HOSPITALIST
Payer: MEDICARE

## 2024-09-08 VITALS
TEMPERATURE: 98 F | SYSTOLIC BLOOD PRESSURE: 203 MMHG | DIASTOLIC BLOOD PRESSURE: 84 MMHG | OXYGEN SATURATION: 97 % | HEART RATE: 68 BPM | RESPIRATION RATE: 20 BRPM

## 2024-09-08 DIAGNOSIS — S72.009A FRACTURE OF UNSPECIFIED PART OF NECK OF UNSPECIFIED FEMUR, INITIAL ENCOUNTER FOR CLOSED FRACTURE: ICD-10-CM

## 2024-09-08 DIAGNOSIS — Z98.890 OTHER SPECIFIED POSTPROCEDURAL STATES: Chronic | ICD-10-CM

## 2024-09-08 LAB
ALBUMIN SERPL ELPH-MCNC: 4.3 G/DL — SIGNIFICANT CHANGE UP (ref 3.5–5.2)
ALP SERPL-CCNC: 173 U/L — HIGH (ref 30–115)
ALT FLD-CCNC: 22 U/L — SIGNIFICANT CHANGE UP (ref 0–41)
ANION GAP SERPL CALC-SCNC: 14 MMOL/L — SIGNIFICANT CHANGE UP (ref 7–14)
APTT BLD: 31.1 SEC — SIGNIFICANT CHANGE UP (ref 27–39.2)
AST SERPL-CCNC: 29 U/L — SIGNIFICANT CHANGE UP (ref 0–41)
BASOPHILS # BLD AUTO: 0.03 K/UL — SIGNIFICANT CHANGE UP (ref 0–0.2)
BASOPHILS NFR BLD AUTO: 0.4 % — SIGNIFICANT CHANGE UP (ref 0–1)
BILIRUB SERPL-MCNC: 0.3 MG/DL — SIGNIFICANT CHANGE UP (ref 0.2–1.2)
BUN SERPL-MCNC: 20 MG/DL — SIGNIFICANT CHANGE UP (ref 10–20)
CALCIUM SERPL-MCNC: 9.6 MG/DL — SIGNIFICANT CHANGE UP (ref 8.4–10.5)
CHLORIDE SERPL-SCNC: 95 MMOL/L — LOW (ref 98–110)
CO2 SERPL-SCNC: 25 MMOL/L — SIGNIFICANT CHANGE UP (ref 17–32)
CREAT SERPL-MCNC: 0.6 MG/DL — LOW (ref 0.7–1.5)
EGFR: 95 ML/MIN/1.73M2 — SIGNIFICANT CHANGE UP
EOSINOPHIL # BLD AUTO: 0.11 K/UL — SIGNIFICANT CHANGE UP (ref 0–0.7)
EOSINOPHIL NFR BLD AUTO: 1.5 % — SIGNIFICANT CHANGE UP (ref 0–8)
GLUCOSE BLDC GLUCOMTR-MCNC: 153 MG/DL — HIGH (ref 70–99)
GLUCOSE BLDC GLUCOMTR-MCNC: 173 MG/DL — HIGH (ref 70–99)
GLUCOSE SERPL-MCNC: 198 MG/DL — HIGH (ref 70–99)
HCT VFR BLD CALC: 34.8 % — LOW (ref 37–47)
HGB BLD-MCNC: 11.1 G/DL — LOW (ref 12–16)
IMM GRANULOCYTES NFR BLD AUTO: 0.9 % — HIGH (ref 0.1–0.3)
INR BLD: 1.02 RATIO — SIGNIFICANT CHANGE UP (ref 0.65–1.3)
LIDOCAIN IGE QN: 89 U/L — HIGH (ref 7–60)
LYMPHOCYTES # BLD AUTO: 1.22 K/UL — SIGNIFICANT CHANGE UP (ref 1.2–3.4)
LYMPHOCYTES # BLD AUTO: 16.4 % — LOW (ref 20.5–51.1)
MCHC RBC-ENTMCNC: 23.4 PG — LOW (ref 27–31)
MCHC RBC-ENTMCNC: 31.9 G/DL — LOW (ref 32–37)
MCV RBC AUTO: 73.3 FL — LOW (ref 81–99)
MONOCYTES # BLD AUTO: 0.3 K/UL — SIGNIFICANT CHANGE UP (ref 0.1–0.6)
MONOCYTES NFR BLD AUTO: 4 % — SIGNIFICANT CHANGE UP (ref 1.7–9.3)
NEUTROPHILS # BLD AUTO: 5.72 K/UL — SIGNIFICANT CHANGE UP (ref 1.4–6.5)
NEUTROPHILS NFR BLD AUTO: 76.8 % — HIGH (ref 42.2–75.2)
NRBC # BLD: 0 /100 WBCS — SIGNIFICANT CHANGE UP (ref 0–0)
PLATELET # BLD AUTO: 222 K/UL — SIGNIFICANT CHANGE UP (ref 130–400)
PMV BLD: 9.6 FL — SIGNIFICANT CHANGE UP (ref 7.4–10.4)
POTASSIUM SERPL-MCNC: 3.9 MMOL/L — SIGNIFICANT CHANGE UP (ref 3.5–5)
POTASSIUM SERPL-SCNC: 3.9 MMOL/L — SIGNIFICANT CHANGE UP (ref 3.5–5)
PROT SERPL-MCNC: 7 G/DL — SIGNIFICANT CHANGE UP (ref 6–8)
PROTHROM AB SERPL-ACNC: 11.6 SEC — SIGNIFICANT CHANGE UP (ref 9.95–12.87)
RBC # BLD: 4.75 M/UL — SIGNIFICANT CHANGE UP (ref 4.2–5.4)
RBC # FLD: 14.7 % — HIGH (ref 11.5–14.5)
SODIUM SERPL-SCNC: 134 MMOL/L — LOW (ref 135–146)
WBC # BLD: 7.45 K/UL — SIGNIFICANT CHANGE UP (ref 4.8–10.8)
WBC # FLD AUTO: 7.45 K/UL — SIGNIFICANT CHANGE UP (ref 4.8–10.8)

## 2024-09-08 PROCEDURE — 97116 GAIT TRAINING THERAPY: CPT | Mod: GP

## 2024-09-08 PROCEDURE — 93010 ELECTROCARDIOGRAM REPORT: CPT

## 2024-09-08 PROCEDURE — 88305 TISSUE EXAM BY PATHOLOGIST: CPT

## 2024-09-08 PROCEDURE — 99223 1ST HOSP IP/OBS HIGH 75: CPT

## 2024-09-08 PROCEDURE — 73502 X-RAY EXAM HIP UNI 2-3 VIEWS: CPT | Mod: LT

## 2024-09-08 PROCEDURE — 86901 BLOOD TYPING SEROLOGIC RH(D): CPT

## 2024-09-08 PROCEDURE — C1776: CPT

## 2024-09-08 PROCEDURE — 86850 RBC ANTIBODY SCREEN: CPT

## 2024-09-08 PROCEDURE — 83036 HEMOGLOBIN GLYCOSYLATED A1C: CPT

## 2024-09-08 PROCEDURE — 73562 X-RAY EXAM OF KNEE 3: CPT | Mod: 26,LT

## 2024-09-08 PROCEDURE — 71045 X-RAY EXAM CHEST 1 VIEW: CPT | Mod: 26

## 2024-09-08 PROCEDURE — 70450 CT HEAD/BRAIN W/O DYE: CPT | Mod: 26,MC

## 2024-09-08 PROCEDURE — 72170 X-RAY EXAM OF PELVIS: CPT | Mod: 26,XE

## 2024-09-08 PROCEDURE — 80048 BASIC METABOLIC PNL TOTAL CA: CPT

## 2024-09-08 PROCEDURE — 73502 X-RAY EXAM HIP UNI 2-3 VIEWS: CPT | Mod: 26,LT

## 2024-09-08 PROCEDURE — C1889: CPT

## 2024-09-08 PROCEDURE — 85025 COMPLETE CBC W/AUTO DIFF WBC: CPT

## 2024-09-08 PROCEDURE — 73552 X-RAY EXAM OF FEMUR 2/>: CPT | Mod: 26,LT

## 2024-09-08 PROCEDURE — C9399: CPT

## 2024-09-08 PROCEDURE — 82962 GLUCOSE BLOOD TEST: CPT

## 2024-09-08 PROCEDURE — 86900 BLOOD TYPING SEROLOGIC ABO: CPT

## 2024-09-08 PROCEDURE — 99285 EMERGENCY DEPT VISIT HI MDM: CPT

## 2024-09-08 PROCEDURE — 97162 PT EVAL MOD COMPLEX 30 MIN: CPT | Mod: GP

## 2024-09-08 PROCEDURE — 97110 THERAPEUTIC EXERCISES: CPT | Mod: GP

## 2024-09-08 PROCEDURE — 99221 1ST HOSP IP/OBS SF/LOW 40: CPT | Mod: 57

## 2024-09-08 PROCEDURE — 83735 ASSAY OF MAGNESIUM: CPT

## 2024-09-08 PROCEDURE — 88311 DECALCIFY TISSUE: CPT

## 2024-09-08 PROCEDURE — C1713: CPT

## 2024-09-08 PROCEDURE — 80053 COMPREHEN METABOLIC PANEL: CPT

## 2024-09-08 PROCEDURE — 72125 CT NECK SPINE W/O DYE: CPT | Mod: 26,MC

## 2024-09-08 PROCEDURE — 36415 COLL VENOUS BLD VENIPUNCTURE: CPT

## 2024-09-08 PROCEDURE — 97165 OT EVAL LOW COMPLEX 30 MIN: CPT | Mod: GO

## 2024-09-08 RX ORDER — DEXTROSE 15 G/33 G
25 GEL IN PACKET (GRAM) ORAL ONCE
Refills: 0 | Status: DISCONTINUED | OUTPATIENT
Start: 2024-09-08 | End: 2024-09-09

## 2024-09-08 RX ORDER — METOPROLOL TARTRATE 100 MG/1
100 TABLET ORAL DAILY
Refills: 0 | Status: DISCONTINUED | OUTPATIENT
Start: 2024-09-08 | End: 2024-09-09

## 2024-09-08 RX ORDER — CHLORHEXIDINE GLUCONATE 40 MG/ML
1 SOLUTION TOPICAL
Refills: 0 | Status: DISCONTINUED | OUTPATIENT
Start: 2024-09-08 | End: 2024-09-09

## 2024-09-08 RX ORDER — EZETIMIBE AND SIMVASTATIN 10; 10 MG/1; MG/1
1 TABLET ORAL
Refills: 0 | DISCHARGE

## 2024-09-08 RX ORDER — GLUCAGON INJECTION, SOLUTION 1 MG/.2ML
1 INJECTION, SOLUTION SUBCUTANEOUS ONCE
Refills: 0 | Status: DISCONTINUED | OUTPATIENT
Start: 2024-09-08 | End: 2024-09-09

## 2024-09-08 RX ORDER — ENOXAPARIN SODIUM 100 MG/ML
40 INJECTION SUBCUTANEOUS EVERY 24 HOURS
Refills: 0 | Status: DISCONTINUED | OUTPATIENT
Start: 2024-09-08 | End: 2024-09-09

## 2024-09-08 RX ORDER — DEXTROSE 15 G/33 G
15 GEL IN PACKET (GRAM) ORAL ONCE
Refills: 0 | Status: DISCONTINUED | OUTPATIENT
Start: 2024-09-08 | End: 2024-09-09

## 2024-09-08 RX ORDER — ONDANSETRON 2 MG/ML
4 INJECTION, SOLUTION INTRAMUSCULAR; INTRAVENOUS EVERY 8 HOURS
Refills: 0 | Status: DISCONTINUED | OUTPATIENT
Start: 2024-09-08 | End: 2024-09-09

## 2024-09-08 RX ORDER — ACETAMINOPHEN 325 MG/1
650 TABLET ORAL EVERY 6 HOURS
Refills: 0 | Status: DISCONTINUED | OUTPATIENT
Start: 2024-09-08 | End: 2024-09-09

## 2024-09-08 RX ORDER — SODIUM CHLORIDE 9 MG/ML
1000 INJECTION INTRAMUSCULAR; INTRAVENOUS; SUBCUTANEOUS
Refills: 0 | Status: DISCONTINUED | OUTPATIENT
Start: 2024-09-09 | End: 2024-09-09

## 2024-09-08 RX ORDER — METFORMIN HYDROCHLORIDE 850 MG/1
1000 TABLET, FILM COATED ORAL
Refills: 0 | Status: DISCONTINUED | OUTPATIENT
Start: 2024-09-08 | End: 2024-09-08

## 2024-09-08 RX ORDER — GLIPIZIDE 10 MG
1 TABLET ORAL
Refills: 0 | DISCHARGE

## 2024-09-08 RX ORDER — DEXTROSE 15 G/33 G
12.5 GEL IN PACKET (GRAM) ORAL ONCE
Refills: 0 | Status: DISCONTINUED | OUTPATIENT
Start: 2024-09-08 | End: 2024-09-09

## 2024-09-08 RX ORDER — OXYCODONE AND ACETAMINOPHEN 7.5; 325 MG/1; MG/1
1 TABLET ORAL EVERY 6 HOURS
Refills: 0 | Status: DISCONTINUED | OUTPATIENT
Start: 2024-09-08 | End: 2024-09-09

## 2024-09-08 RX ORDER — ONDANSETRON 2 MG/ML
4 INJECTION, SOLUTION INTRAMUSCULAR; INTRAVENOUS ONCE
Refills: 0 | Status: DISCONTINUED | OUTPATIENT
Start: 2024-09-08 | End: 2024-09-09

## 2024-09-08 RX ORDER — MAGNESIUM, ALUMINUM HYDROXIDE 200-225/5
30 SUSPENSION, ORAL (FINAL DOSE FORM) ORAL EVERY 4 HOURS
Refills: 0 | Status: DISCONTINUED | OUTPATIENT
Start: 2024-09-08 | End: 2024-09-09

## 2024-09-08 RX ADMIN — METOPROLOL TARTRATE 100 MILLIGRAM(S): 100 TABLET ORAL at 15:34

## 2024-09-08 RX ADMIN — Medication 10 MILLIGRAM(S): at 21:28

## 2024-09-08 RX ADMIN — Medication 1: at 16:59

## 2024-09-08 RX ADMIN — Medication 4 MILLIGRAM(S): at 12:10

## 2024-09-08 RX ADMIN — ENOXAPARIN SODIUM 40 MILLIGRAM(S): 100 INJECTION SUBCUTANEOUS at 15:34

## 2024-09-08 RX ADMIN — Medication 2 MILLIGRAM(S): at 13:00

## 2024-09-08 RX ADMIN — Medication 2 MILLIGRAM(S): at 17:02

## 2024-09-08 RX ADMIN — Medication 2 MILLIGRAM(S): at 12:43

## 2024-09-08 RX ADMIN — Medication 2 MILLIGRAM(S): at 17:29

## 2024-09-08 RX ADMIN — Medication 4 MILLIGRAM(S): at 11:57

## 2024-09-08 RX ADMIN — Medication 4 MILLIGRAM(S): at 14:33

## 2024-09-08 NOTE — H&P ADULT - ASSESSMENT
Patient is a 75yo F with a pmhx of DM, HTN, hyperlipidemia, OA,  right THR, slipped and fell at home today and was unable to ambulate. Pt reports associated severe pain in the left hip when she moves her leg. She denies any associated chest pain, sob, dizziness, abd pain, vomiting, or fever. Patient BIBA to ED. Pt given morphine in ED which helped relieve her pain.     #fall, gait disorder  #Left hip fracture  -bed rest, no weight bearing to LLE  -ortho consult  -NPO p midnite for surgery  -pain meds prn  -DVT prophylaxis  -CHG bath  -d/w Dr Mack    #HTN  -DASH diet  -monitor bp  -cont meds from home toprol  -hold diuretic/HCTZ priro to surgery    #DM  -CHO diet  -monitor BS  -cont meds from home metformin  -hold glipizide in hospital  -insulin sliding scale  -hgb a1c    #hyperlipidemia  -hold vytorin in hospital  -atorvastatin 10mg po qhs   Patient is a 73yo F with a pmhx of DM, HTN, hyperlipidemia, OA,  right THR, slipped and fell at home today and was unable to ambulate. Pt reports associated severe pain in the left hip when she moves her leg. She denies any associated chest pain, sob, dizziness, abd pain, vomiting, or fever. Patient BIBA to ED. Pt given morphine in ED which helped relieve her pain.     #mechanical fall, gait disorder  #Left hip fracture  - +HT, -AC  - CTH negative  -bed rest, no weight bearing to LLE  -ortho consult  -NPO p midnite for surgery  -pain meds prn  -DVT prophylaxis  -CHG bath  -d/w Dr Mack    #HTN  -DASH diet  -monitor bp  -cont meds from home toprol  -hold diuretic/HCTZ priro to surgery    #DM  -CHO diet  -monitor BS  -cont meds from home metformin  -hold glipizide in hospital  -insulin sliding scale  -hgb a1c    #hyperlipidemia  -hold vytorin in hospital  -atorvastatin 10mg po qhs

## 2024-09-08 NOTE — ED PROVIDER NOTE - CLINICAL SUMMARY MEDICAL DECISION MAKING FREE TEXT BOX
Patient is a 74-year-old female presents for evaluation status post fall she was walking with a walker tripped and fell backward complaining of pain in her left hip patient called from relative who found her on the floor paramedics arrived on scene she presents here with pain to palpation of the left hip moderate throbbing denies any headache visual changes chest pain shortness of breath abdominal pain or back pain no other extremity pain noted    On physical exam patient is normocephalic atraumatic pupils equal round reactive light accommodation extraocular muscles intact oropharynx clear chest clear to auscultation bilaterally abdomen soft nontender nondistended bowel sounds positive no guarding or rebound patient has tenderness to palpation of the left hip pedal pulses 2+ pain to palpation no skin changes noted    On assessment plan patient presents for evaluation of tenderness to palpation to the left hip pedal pulses 2+ no evidence of vascular compromise at this point we obtain x-ray of the pelvis per my independent evaluation consistent with fractures x-ray of the hip consistent with fractures x-ray of chest per my independent evaluation not consistent with pneumonia or pneumothorax patient given multiple doses of IV pain medicine I will admit for further evaluation at this point we discussed case with orthopedics advised admit patient to [facility] for further evaluation

## 2024-09-08 NOTE — ED PROVIDER NOTE - ATTENDING APP SHARED VISIT CONTRIBUTION OF CARE
I have personally performed a history and physical exam on this patient and personally directed the management of the patient. Patient is a 74-year-old female presents for evaluation status post fall she was walking with a walker tripped and fell backward complaining of pain in her left hip patient called from relative who found her on the floor paramedics arrived on scene she presents here with pain to palpation of the left hip moderate throbbing denies any headache visual changes chest pain shortness of breath abdominal pain or back pain no other extremity pain noted    On physical exam patient is normocephalic atraumatic pupils equal round reactive light accommodation extraocular muscles intact oropharynx clear chest clear to auscultation bilaterally abdomen soft nontender nondistended bowel sounds positive no guarding or rebound patient has tenderness to palpation of the left hip pedal pulses 2+ pain to palpation no skin changes noted    On assessment plan patient presents for evaluation of tenderness to palpation to the left hip pedal pulses 2+ no evidence of vascular compromise at this point we obtain x-ray of the pelvis per my independent evaluation consistent with fractures x-ray of the hip consistent with fractures x-ray of chest per my independent evaluation not consistent with pneumonia or pneumothorax patient given multiple doses of IV pain medicine I will admit for further evaluation at this point we discussed case with orthopedics advised admit patient to [facility] for further evaluation

## 2024-09-08 NOTE — H&P ADULT - NS ATTEND AMEND GEN_ALL_CORE FT
s/p mechanical fall with left hip fracture. EKG NSR, CXR unremarkable. METS >4, RCRI 3.9% for 30-day risk of death, MI, or cardiac arrest. Pt is medically optimized from cardiopulmonary standpoint for surgery.

## 2024-09-08 NOTE — ED ADULT TRIAGE NOTE - CHIEF COMPLAINT QUOTE
Pt brought in by ambulance from home. "I slipped and fell this morning." Pt complains of pain to left hip.

## 2024-09-08 NOTE — ED ADULT NURSE NOTE - NSICDXPASTSURGICALHX_GEN_ALL_CORE_FT
PAST SURGICAL HISTORY:  History of joint surgery Right HIP ORIF  [facility] [place]    History of tonsillectomy and adenoidectomy 1957

## 2024-09-08 NOTE — H&P ADULT - NSHPPHYSICALEXAM_GEN_ALL_CORE
Vital Signs Last 24 Hrs  T(C): 36.6 (08 Sep 2024 10:43), Max: 36.6 (08 Sep 2024 10:43)  T(F): 97.8 (08 Sep 2024 10:43), Max: 97.8 (08 Sep 2024 10:43)  HR: 85 (08 Sep 2024 14:38) (68 - 85)  BP: 175/72 (08 Sep 2024 14:38) (175/72 - 203/84)  BP(mean): --  RR: 18 (08 Sep 2024 14:38) (18 - 20)  SpO2: 97% (08 Sep 2024 14:38) (97% - 97%)    PHYSICAL EXAM:      Constitutional: A&Ox4  Respiratory: cta b/l  Cardiovascular: s1 s2 rrr  Gastrointestinal: soft nt  nd + bs no rebound or guarding  Genitourinary: no cva tenderness  Extremities: normal rom, no edema, calf tenderness  Neurological:no focal deficits  musculoskeletal: +tenderness to left hip, no swelling, no obvious rotation of hip/deformity  Skin: no rash

## 2024-09-08 NOTE — H&P ADULT - NSHPLABSRESULTS_GEN_ALL_CORE
11.1   7.45  )-----------( 222      ( 08 Sep 2024 11:44 )             34.8       09-08    134<L>  |  95<L>  |  20  ----------------------------<  198<H>  3.9   |  25  |  0.6<L>    Ca    9.6      08 Sep 2024 11:44    TPro  7.0  /  Alb  4.3  /  TBili  0.3  /  DBili  x   /  AST  29  /  ALT  22  /  AlkPhos  173<H>  09-08              Urinalysis Basic - ( 08 Sep 2024 11:44 )    Color: x / Appearance: x / SG: x / pH: x  Gluc: 198 mg/dL / Ketone: x  / Bili: x / Urobili: x   Blood: x / Protein: x / Nitrite: x   Leuk Esterase: x / RBC: x / WBC x   Sq Epi: x / Non Sq Epi: x / Bacteria: x        PT/INR - ( 08 Sep 2024 11:44 )   PT: 11.60 sec;   INR: 1.02 ratio         PTT - ( 08 Sep 2024 11:44 )  PTT:31.1 sec    Lactate Trend          < from: Xray Femur 2 Views, Left (09.08.24 @ 14:25) >    IMPRESSION:    There is an acute femoral neck fracture. No dislocation is seen. The   bones are osteopenic.      < from: Xray Chest 1 View- PORTABLE-Urgent (09.08.24 @ 11:59) >    Impression:    No radiographic evidence of acute cardiopulmonary disease.        < from: CT Head No Cont (09.08.24 @ 11:27) >    IMPRESSION:  CT head: No evidence of acute transcortical infarct, acute intracranial   hemorrhage or mass effect.    CT cervical spine: No acute fracture or traumatic subluxation.

## 2024-09-08 NOTE — H&P ADULT - NSICDXPASTSURGICALHX_GEN_ALL_CORE_FT
PAST SURGICAL HISTORY:  History of joint surgery Right HIP ORIF  [facility] [place]    History of tonsillectomy and adenoidectomy 1957

## 2024-09-08 NOTE — PATIENT PROFILE ADULT - FALL HARM RISK - HARM RISK INTERVENTIONS

## 2024-09-08 NOTE — ED PROVIDER NOTE - NSICDXPASTSURGICALHX_GEN_ALL_CORE_FT
PAST SURGICAL HISTORY:  History of joint surgery Right HIP ORIF  [facility] [place]    History of tonsillectomy and adenoidectomy 1957

## 2024-09-08 NOTE — ED PROVIDER NOTE - OBJECTIVE STATEMENT
74 year old female pmhxof Hypertension, HLD comes to emergency room for fall. patient states was walking with walker and tripped and fell back and hit head and felt pain in her left hip. no loss of consciousness. patient called for help and brought to emergency room.

## 2024-09-08 NOTE — CONSULT NOTE ADULT - ATTENDING COMMENTS
Pt. seen/ examined  Imaging/ labs/ vitals reviewed  L femoral neck fx  Discussed plan for L ABIMBOLA with patient  Planning OR tomorrow  Please obtain AM labs: CBC, COMP, PT/PTT/INR, A1C  Type/cross 2 units PRBCs  Clorhexidine wipes X3  Medical clearance/ optimization  Will follow

## 2024-09-08 NOTE — CONSULT NOTE ADULT - SUBJECTIVE AND OBJECTIVE BOX
Orthopedics Brief Consult Note    Patient is 74F who presented to [facility] with displaced left femoral neck fracture.     Plan for OR for beryl vs. total hip arthroplasty 9/9 vs 9/10 at [facility].     Please obtain pre op labs, cxr, ekg, xr L femur and knee.   NPO/IVF midnight  Risk stratification/optimization by medicine    Full consult note to follow

## 2024-09-08 NOTE — H&P ADULT - CONVERSATION DETAILS
Patient would like no limitations on health care. Would like surgery and be able to walk again as previous.   Would like cpr and intubation in event of an emergency,

## 2024-09-08 NOTE — ED PROVIDER NOTE - PHYSICAL EXAMINATION
Physical Exam    Vital Signs: I have reviewed the initial vital signs.  Constitutional no acute distress  Eyes: Conjunctiva pink, Sclera clear, PERRLA, EOMI.  Cardiovascular: S1 and S2, regular rate, regular rhythm, well-perfused extremities, radial pulses equal and 2+  Respiratory: unlabored respiratory effort, clear to auscultation bilaterally no wheezing, rales and rhonchi  Gastrointestinal: soft, non-tender abdomen, no pulsatile mass, normal bowl sounds  Musculoskeletal: supple neck, no lower extremity edema, no midline tenderness + left hip tenderness, shortened and externally rotated   Integumentary: warm, dry, no rash  Neurologic: awake, alert, cranial nerves II-XII grossly intact, extremities’ motor and sensory functions grossly intact  Psychiatric: appropriate mood, appropriate affect

## 2024-09-08 NOTE — ED ADULT NURSE NOTE - NSFALLRISKINTERV_ED_ALL_ED

## 2024-09-09 LAB
A1C WITH ESTIMATED AVERAGE GLUCOSE RESULT: 7.8 % — HIGH (ref 4–5.6)
ANION GAP SERPL CALC-SCNC: 11 MMOL/L — SIGNIFICANT CHANGE UP (ref 7–14)
BASOPHILS # BLD AUTO: 0.03 K/UL — SIGNIFICANT CHANGE UP (ref 0–0.2)
BASOPHILS NFR BLD AUTO: 0.4 % — SIGNIFICANT CHANGE UP (ref 0–1)
BLD GP AB SCN SERPL QL: SIGNIFICANT CHANGE UP
BUN SERPL-MCNC: 19 MG/DL — SIGNIFICANT CHANGE UP (ref 10–20)
CALCIUM SERPL-MCNC: 9.1 MG/DL — SIGNIFICANT CHANGE UP (ref 8.4–10.5)
CHLORIDE SERPL-SCNC: 93 MMOL/L — LOW (ref 98–110)
CO2 SERPL-SCNC: 28 MMOL/L — SIGNIFICANT CHANGE UP (ref 17–32)
CREAT SERPL-MCNC: 0.7 MG/DL — SIGNIFICANT CHANGE UP (ref 0.7–1.5)
EGFR: 92 ML/MIN/1.73M2 — SIGNIFICANT CHANGE UP
EOSINOPHIL # BLD AUTO: 0.17 K/UL — SIGNIFICANT CHANGE UP (ref 0–0.7)
EOSINOPHIL NFR BLD AUTO: 2.3 % — SIGNIFICANT CHANGE UP (ref 0–8)
ESTIMATED AVERAGE GLUCOSE: 177 MG/DL — HIGH (ref 68–114)
GLUCOSE BLDC GLUCOMTR-MCNC: 202 MG/DL — HIGH (ref 70–99)
GLUCOSE SERPL-MCNC: 190 MG/DL — HIGH (ref 70–99)
HCT VFR BLD CALC: 34.1 % — LOW (ref 37–47)
HGB BLD-MCNC: 11.1 G/DL — LOW (ref 12–16)
IMM GRANULOCYTES NFR BLD AUTO: 0.4 % — HIGH (ref 0.1–0.3)
LYMPHOCYTES # BLD AUTO: 1.22 K/UL — SIGNIFICANT CHANGE UP (ref 1.2–3.4)
LYMPHOCYTES # BLD AUTO: 16.2 % — LOW (ref 20.5–51.1)
MCHC RBC-ENTMCNC: 23.6 PG — LOW (ref 27–31)
MCHC RBC-ENTMCNC: 32.6 G/DL — SIGNIFICANT CHANGE UP (ref 32–37)
MCV RBC AUTO: 72.4 FL — LOW (ref 81–99)
MONOCYTES # BLD AUTO: 0.4 K/UL — SIGNIFICANT CHANGE UP (ref 0.1–0.6)
MONOCYTES NFR BLD AUTO: 5.3 % — SIGNIFICANT CHANGE UP (ref 1.7–9.3)
NEUTROPHILS # BLD AUTO: 5.7 K/UL — SIGNIFICANT CHANGE UP (ref 1.4–6.5)
NEUTROPHILS NFR BLD AUTO: 75.4 % — HIGH (ref 42.2–75.2)
NRBC # BLD: 0 /100 WBCS — SIGNIFICANT CHANGE UP (ref 0–0)
PLATELET # BLD AUTO: 225 K/UL — SIGNIFICANT CHANGE UP (ref 130–400)
PMV BLD: 9.5 FL — SIGNIFICANT CHANGE UP (ref 7.4–10.4)
POTASSIUM SERPL-MCNC: 4.4 MMOL/L — SIGNIFICANT CHANGE UP (ref 3.5–5)
POTASSIUM SERPL-SCNC: 4.4 MMOL/L — SIGNIFICANT CHANGE UP (ref 3.5–5)
RBC # BLD: 4.71 M/UL — SIGNIFICANT CHANGE UP (ref 4.2–5.4)
RBC # FLD: 14.6 % — HIGH (ref 11.5–14.5)
SODIUM SERPL-SCNC: 132 MMOL/L — LOW (ref 135–146)
WBC # BLD: 7.55 K/UL — SIGNIFICANT CHANGE UP (ref 4.8–10.8)
WBC # FLD AUTO: 7.55 K/UL — SIGNIFICANT CHANGE UP (ref 4.8–10.8)

## 2024-09-09 PROCEDURE — 88311 DECALCIFY TISSUE: CPT | Mod: 26

## 2024-09-09 PROCEDURE — 99232 SBSQ HOSP IP/OBS MODERATE 35: CPT

## 2024-09-09 PROCEDURE — 88305 TISSUE EXAM BY PATHOLOGIST: CPT | Mod: 26

## 2024-09-09 PROCEDURE — 27130 TOTAL HIP ARTHROPLASTY: CPT | Mod: LT

## 2024-09-09 RX ORDER — DEXTROSE 15 G/33 G
25 GEL IN PACKET (GRAM) ORAL ONCE
Refills: 0 | Status: DISCONTINUED | OUTPATIENT
Start: 2024-09-09 | End: 2024-09-13

## 2024-09-09 RX ORDER — ACETAMINOPHEN 325 MG/1
650 TABLET ORAL EVERY 6 HOURS
Refills: 0 | Status: DISCONTINUED | OUTPATIENT
Start: 2024-09-09 | End: 2024-09-13

## 2024-09-09 RX ORDER — ASPIRIN 81 MG
81 TABLET, DELAYED RELEASE (ENTERIC COATED) ORAL
Refills: 0 | Status: DISCONTINUED | OUTPATIENT
Start: 2024-09-10 | End: 2024-09-13

## 2024-09-09 RX ORDER — GLUCAGON INJECTION, SOLUTION 1 MG/.2ML
1 INJECTION, SOLUTION SUBCUTANEOUS ONCE
Refills: 0 | Status: DISCONTINUED | OUTPATIENT
Start: 2024-09-09 | End: 2024-09-13

## 2024-09-09 RX ORDER — DEXTROSE 15 G/33 G
15 GEL IN PACKET (GRAM) ORAL ONCE
Refills: 0 | Status: DISCONTINUED | OUTPATIENT
Start: 2024-09-09 | End: 2024-09-13

## 2024-09-09 RX ORDER — HYDROMORPHONE HYDROCHLORIDE 2 MG/1
0.5 TABLET ORAL
Refills: 0 | Status: DISCONTINUED | OUTPATIENT
Start: 2024-09-09 | End: 2024-09-10

## 2024-09-09 RX ORDER — CHLORHEXIDINE GLUCONATE 40 MG/ML
1 SOLUTION TOPICAL
Refills: 0 | Status: COMPLETED | OUTPATIENT
Start: 2024-09-09 | End: 2024-09-13

## 2024-09-09 RX ORDER — MAGNESIUM, ALUMINUM HYDROXIDE 200-225/5
30 SUSPENSION, ORAL (FINAL DOSE FORM) ORAL EVERY 4 HOURS
Refills: 0 | Status: DISCONTINUED | OUTPATIENT
Start: 2024-09-09 | End: 2024-09-13

## 2024-09-09 RX ORDER — DEXTROSE 15 G/33 G
12.5 GEL IN PACKET (GRAM) ORAL ONCE
Refills: 0 | Status: DISCONTINUED | OUTPATIENT
Start: 2024-09-09 | End: 2024-09-13

## 2024-09-09 RX ORDER — NYSTATIN 100000/G
1 CREAM (GRAM) TOPICAL
Refills: 0 | Status: DISCONTINUED | OUTPATIENT
Start: 2024-09-09 | End: 2024-09-09

## 2024-09-09 RX ORDER — METOPROLOL TARTRATE 100 MG/1
100 TABLET ORAL DAILY
Refills: 0 | Status: DISCONTINUED | OUTPATIENT
Start: 2024-09-09 | End: 2024-09-13

## 2024-09-09 RX ORDER — SODIUM CHLORIDE 9 MG/ML
1000 INJECTION INTRAMUSCULAR; INTRAVENOUS; SUBCUTANEOUS
Refills: 0 | Status: DISCONTINUED | OUTPATIENT
Start: 2024-09-09 | End: 2024-09-11

## 2024-09-09 RX ORDER — CEFAZOLIN SODIUM 2 G/100ML
2000 INJECTION, SOLUTION INTRAVENOUS EVERY 8 HOURS
Refills: 0 | Status: COMPLETED | OUTPATIENT
Start: 2024-09-09 | End: 2024-09-10

## 2024-09-09 RX ORDER — ONDANSETRON 2 MG/ML
4 INJECTION, SOLUTION INTRAMUSCULAR; INTRAVENOUS ONCE
Refills: 0 | Status: DISCONTINUED | OUTPATIENT
Start: 2024-09-09 | End: 2024-09-10

## 2024-09-09 RX ADMIN — Medication 2: at 07:48

## 2024-09-09 RX ADMIN — METOPROLOL TARTRATE 100 MILLIGRAM(S): 100 TABLET ORAL at 05:19

## 2024-09-09 RX ADMIN — Medication 2 MILLIGRAM(S): at 11:58

## 2024-09-09 RX ADMIN — Medication 2 MILLIGRAM(S): at 12:28

## 2024-09-09 RX ADMIN — Medication 1: at 16:44

## 2024-09-09 RX ADMIN — Medication 1 APPLICATION(S): at 18:00

## 2024-09-09 RX ADMIN — CHLORHEXIDINE GLUCONATE 1 APPLICATION(S): 40 SOLUTION TOPICAL at 05:19

## 2024-09-09 RX ADMIN — Medication 2 MILLIGRAM(S): at 06:57

## 2024-09-09 RX ADMIN — Medication 2 MILLIGRAM(S): at 07:30

## 2024-09-09 RX ADMIN — Medication 1: at 11:49

## 2024-09-09 NOTE — PROGRESS NOTE ADULT - ASSESSMENT
Patient is a 75yo F with a pmhx of DM, HTN, hyperlipidemia, OA,  right THR, slipped and fell at home today and was unable to ambulate. Pt reports associated severe pain in the left hip when she moves her leg. She denies any associated chest pain, sob, dizziness, abd pain, vomiting, or fever. Patient BIBA to ED. Pt given morphine in ED which helped relieve her pain.     #mechanical fall, gait disorder  #Left hip fracture  - +HT, -AC  - CTH negative  -ortho onboard, for OR today   -NPO for OR  -pain meds prn (morphine)  -DVT prophylaxis lovenox   -PT/rehab after OR     #HTN  -DASH diet  -monitor bp  -cont meds from home toprol  -diuretic/HCTZ held for OR    #DM  -monitor BS  -hold home PO meds   -insulin sliding scale  -hgb a1c 7.8    #hyperlipidemia  -hold vytorin in hospital  -atorvastatin 10mg po qhs    DVT ppx lovenox  for OR today, then PT/rehab

## 2024-09-09 NOTE — PRE-OP CHECKLIST - HEIGHT IN FEET

## 2024-09-09 NOTE — PRE-OP CHECKLIST - SELECT TESTS ORDERED
CT head, CT Cervical, Spine, Xray L femur ,Xray Lknee/BMP/CBC/CMP/PT/PTT/INR/Type and Screen/CXR CT head, CT Cervical, Spine, Xray L femur ,Xray Lknee/BMP/CBC/CMP/PT/PTT/INR/Type and Screen/EKG/CXR

## 2024-09-09 NOTE — PRE-ANESTHESIA EVALUATION ADULT - NSANTHADDINFOFT_GEN_ALL_CORE
after discussing risks benefits and alternatives, the patient refuses spinal anesthesia and requests general anesthesia

## 2024-09-09 NOTE — CHART NOTE - NSCHARTNOTEFT_GEN_A_CORE
PACU ANESTHESIA ADMISSION NOTE      Procedure:   Post op diagnosis:      ____  Intubated  TV:______       Rate: ______      FiO2: ______    __x__  Patent Airway    __x__  Full return of protective reflexes    __x__  Full recovery from anesthesia / back to baseline     Vitals:   T: 97.9          R:  18                BP:  181/83                Sat:  94                 P: 72      Mental Status:  _x___ Awake   __x___ Alert   _____ Drowsy   _____ Sedated    Nausea/Vomiting:  _x___ NO  ______Yes,   See Post - Op Orders          Pain Scale (0-10):  __0___    Treatment: ____ None    ____ See Post - Op/PCA Orders    Post - Operative Fluids:   ____ Oral   __x__ See Post - Op Orders    Plan: Discharge:   ____Home       __x___Floor     _____Critical Care    _____  Other:_________________    Comments:

## 2024-09-10 LAB
ALBUMIN SERPL ELPH-MCNC: 3.8 G/DL — SIGNIFICANT CHANGE UP (ref 3.5–5.2)
ALP SERPL-CCNC: 130 U/L — HIGH (ref 30–115)
ALT FLD-CCNC: 10 U/L — SIGNIFICANT CHANGE UP (ref 0–41)
ANION GAP SERPL CALC-SCNC: 14 MMOL/L — SIGNIFICANT CHANGE UP (ref 7–14)
AST SERPL-CCNC: 17 U/L — SIGNIFICANT CHANGE UP (ref 0–41)
BASOPHILS # BLD AUTO: 0.01 K/UL — SIGNIFICANT CHANGE UP (ref 0–0.2)
BASOPHILS NFR BLD AUTO: 0.1 % — SIGNIFICANT CHANGE UP (ref 0–1)
BILIRUB SERPL-MCNC: 0.3 MG/DL — SIGNIFICANT CHANGE UP (ref 0.2–1.2)
BUN SERPL-MCNC: 24 MG/DL — HIGH (ref 10–20)
CALCIUM SERPL-MCNC: 8 MG/DL — LOW (ref 8.4–10.5)
CHLORIDE SERPL-SCNC: 97 MMOL/L — LOW (ref 98–110)
CO2 SERPL-SCNC: 24 MMOL/L — SIGNIFICANT CHANGE UP (ref 17–32)
CREAT SERPL-MCNC: 1.1 MG/DL — SIGNIFICANT CHANGE UP (ref 0.7–1.5)
EGFR: 53 ML/MIN/1.73M2 — LOW
EOSINOPHIL # BLD AUTO: 0.01 K/UL — SIGNIFICANT CHANGE UP (ref 0–0.7)
EOSINOPHIL NFR BLD AUTO: 0.1 % — SIGNIFICANT CHANGE UP (ref 0–8)
GLUCOSE BLDC GLUCOMTR-MCNC: 216 MG/DL — HIGH (ref 70–99)
GLUCOSE BLDC GLUCOMTR-MCNC: 224 MG/DL — HIGH (ref 70–99)
GLUCOSE BLDC GLUCOMTR-MCNC: 233 MG/DL — HIGH (ref 70–99)
GLUCOSE SERPL-MCNC: 283 MG/DL — HIGH (ref 70–99)
HCT VFR BLD CALC: 30 % — LOW (ref 37–47)
HGB BLD-MCNC: 9.6 G/DL — LOW (ref 12–16)
IMM GRANULOCYTES NFR BLD AUTO: 0.5 % — HIGH (ref 0.1–0.3)
LYMPHOCYTES # BLD AUTO: 0.74 K/UL — LOW (ref 1.2–3.4)
LYMPHOCYTES # BLD AUTO: 8.5 % — LOW (ref 20.5–51.1)
MAGNESIUM SERPL-MCNC: 1.6 MG/DL — LOW (ref 1.8–2.4)
MCHC RBC-ENTMCNC: 23.6 PG — LOW (ref 27–31)
MCHC RBC-ENTMCNC: 32 G/DL — SIGNIFICANT CHANGE UP (ref 32–37)
MCV RBC AUTO: 73.7 FL — LOW (ref 81–99)
MONOCYTES # BLD AUTO: 0.38 K/UL — SIGNIFICANT CHANGE UP (ref 0.1–0.6)
MONOCYTES NFR BLD AUTO: 4.4 % — SIGNIFICANT CHANGE UP (ref 1.7–9.3)
NEUTROPHILS # BLD AUTO: 7.52 K/UL — HIGH (ref 1.4–6.5)
NEUTROPHILS NFR BLD AUTO: 86.4 % — HIGH (ref 42.2–75.2)
NRBC # BLD: 0 /100 WBCS — SIGNIFICANT CHANGE UP (ref 0–0)
PLATELET # BLD AUTO: 199 K/UL — SIGNIFICANT CHANGE UP (ref 130–400)
PMV BLD: 9.5 FL — SIGNIFICANT CHANGE UP (ref 7.4–10.4)
POTASSIUM SERPL-MCNC: 4.2 MMOL/L — SIGNIFICANT CHANGE UP (ref 3.5–5)
POTASSIUM SERPL-SCNC: 4.2 MMOL/L — SIGNIFICANT CHANGE UP (ref 3.5–5)
PROT SERPL-MCNC: 6.3 G/DL — SIGNIFICANT CHANGE UP (ref 6–8)
RBC # BLD: 4.07 M/UL — LOW (ref 4.2–5.4)
RBC # FLD: 14.9 % — HIGH (ref 11.5–14.5)
SODIUM SERPL-SCNC: 135 MMOL/L — SIGNIFICANT CHANGE UP (ref 135–146)
WBC # BLD: 8.7 K/UL — SIGNIFICANT CHANGE UP (ref 4.8–10.8)
WBC # FLD AUTO: 8.7 K/UL — SIGNIFICANT CHANGE UP (ref 4.8–10.8)

## 2024-09-10 PROCEDURE — 99232 SBSQ HOSP IP/OBS MODERATE 35: CPT

## 2024-09-10 RX ADMIN — Medication 2: at 17:25

## 2024-09-10 RX ADMIN — CEFAZOLIN SODIUM 100 MILLIGRAM(S): 2 INJECTION, SOLUTION INTRAVENOUS at 05:22

## 2024-09-10 RX ADMIN — CEFAZOLIN SODIUM 100 MILLIGRAM(S): 2 INJECTION, SOLUTION INTRAVENOUS at 21:25

## 2024-09-10 RX ADMIN — CEFAZOLIN SODIUM 100 MILLIGRAM(S): 2 INJECTION, SOLUTION INTRAVENOUS at 14:31

## 2024-09-10 RX ADMIN — Medication 3: at 07:41

## 2024-09-10 RX ADMIN — Medication 81 MILLIGRAM(S): at 05:22

## 2024-09-10 RX ADMIN — Medication 25 GRAM(S): at 17:25

## 2024-09-10 RX ADMIN — CHLORHEXIDINE GLUCONATE 1 APPLICATION(S): 40 SOLUTION TOPICAL at 05:26

## 2024-09-10 RX ADMIN — Medication 1: at 00:55

## 2024-09-10 RX ADMIN — Medication 81 MILLIGRAM(S): at 17:25

## 2024-09-10 RX ADMIN — Medication 10 MILLIGRAM(S): at 21:24

## 2024-09-10 RX ADMIN — Medication 2: at 11:39

## 2024-09-10 RX ADMIN — METOPROLOL TARTRATE 100 MILLIGRAM(S): 100 TABLET ORAL at 05:22

## 2024-09-10 RX ADMIN — SODIUM CHLORIDE 60 MILLILITER(S): 9 INJECTION INTRAMUSCULAR; INTRAVENOUS; SUBCUTANEOUS at 00:54

## 2024-09-10 RX ADMIN — Medication 25 GRAM(S): at 15:02

## 2024-09-10 NOTE — PHYSICAL THERAPY INITIAL EVALUATION ADULT - PERTINENT HX OF CURRENT PROBLEM, REHAB EVAL
History of Present Illness:   Patient is a 73yo F with a pmhx of DM, HTN, hyperlipidemia, OA,  right hip replacement, slipped and fell at home today and was unable to ambulate. Pt reports associated severe pain in the left hip when she moves her leg. Pt states she normally uses walker but today used a cane prior to falling. She denies any associated chest pain, sob, dizziness, abd pain, vomiting, or fever.Patient BIBA to ED. Pt given morphine in ED which helped relieve her pain.

## 2024-09-10 NOTE — PROGRESS NOTE ADULT - ASSESSMENT
75yo F with a pmhx of DM, HTN, hyperlipidemia, OA,  right THR, slipped and fell at home today and was unable to ambulate. Pt reports associated severe pain in the left hip when she moves her leg. She denies any associated chest pain, sob, dizziness, abd pain, vomiting, or fever. Patient BIBA to ED. Pt given morphine in ED which helped relieve her pain.     #mechanical fall, gait disorder  #Left hip fracture s/p ORIF  - +HT, -AC  - CTH negative  - s/p left hip replacement 9/9  -pain meds prn (morphine)  -DVT prophylaxis lovenox   -PT/rehab    #HTN  -cont meds from home toprol  -diuretic/HCTZ held for OR    #DM  -monitor BS  -hold home PO meds   -insulin sliding scale  -hgb a1c 7.8    #HLD  -hold vytorin in hospital  -atorvastatin 10mg po qhs    Misc:  DVT ppx: lovenox  GI ppx: None  Diet: DASH/TLC  Activity: IAT  Code: Full Code  Dispo: DC planning, PT/rehab

## 2024-09-10 NOTE — PHYSICAL THERAPY INITIAL EVALUATION ADULT - ADDITIONAL COMMENTS
Pt reports she lives alone in house with 4 IVETH, pt has been amb with st cane due to a fall few months ago, where she broke her pelvis. Pt otherwise reports being independent.

## 2024-09-10 NOTE — PHYSICAL THERAPY INITIAL EVALUATION ADULT - GENERAL OBSERVATIONS, REHAB EVAL
8:50-9:20 Chart reviewed. Patient available to be seen for physical therapy, denies pain, confirmed with RN. Pt rec'd in bed +IV, + Primafit, + L hip Prevena, pt in NAD.

## 2024-09-11 LAB
GLUCOSE BLDC GLUCOMTR-MCNC: 225 MG/DL — HIGH (ref 70–99)
GLUCOSE BLDC GLUCOMTR-MCNC: 235 MG/DL — HIGH (ref 70–99)
GLUCOSE BLDC GLUCOMTR-MCNC: 242 MG/DL — HIGH (ref 70–99)
GLUCOSE BLDC GLUCOMTR-MCNC: 246 MG/DL — HIGH (ref 70–99)

## 2024-09-11 PROCEDURE — 99232 SBSQ HOSP IP/OBS MODERATE 35: CPT

## 2024-09-11 RX ORDER — METFORMIN HYDROCHLORIDE 850 MG/1
1000 TABLET, FILM COATED ORAL
Refills: 0 | Status: DISCONTINUED | OUTPATIENT
Start: 2024-09-11 | End: 2024-09-13

## 2024-09-11 RX ORDER — ENOXAPARIN SODIUM 100 MG/ML
40 INJECTION SUBCUTANEOUS EVERY 24 HOURS
Refills: 0 | Status: DISCONTINUED | OUTPATIENT
Start: 2024-09-11 | End: 2024-09-13

## 2024-09-11 RX ADMIN — METOPROLOL TARTRATE 100 MILLIGRAM(S): 100 TABLET ORAL at 05:15

## 2024-09-11 RX ADMIN — Medication 10 MILLIGRAM(S): at 21:17

## 2024-09-11 RX ADMIN — Medication 2: at 08:32

## 2024-09-11 RX ADMIN — METFORMIN HYDROCHLORIDE 1000 MILLIGRAM(S): 850 TABLET, FILM COATED ORAL at 17:00

## 2024-09-11 RX ADMIN — ACETAMINOPHEN 650 MILLIGRAM(S): 325 TABLET ORAL at 05:17

## 2024-09-11 RX ADMIN — Medication 2: at 11:52

## 2024-09-11 RX ADMIN — Medication 81 MILLIGRAM(S): at 17:00

## 2024-09-11 RX ADMIN — CHLORHEXIDINE GLUCONATE 1 APPLICATION(S): 40 SOLUTION TOPICAL at 05:17

## 2024-09-11 RX ADMIN — Medication 81 MILLIGRAM(S): at 05:15

## 2024-09-11 RX ADMIN — Medication 2: at 16:57

## 2024-09-11 NOTE — OCCUPATIONAL THERAPY INITIAL EVALUATION ADULT - PERTINENT HX OF CURRENT PROBLEM, REHAB EVAL
Patient is a 75yo F with a pmhx of DM, HTN, hyperlipidemia, OA,  right hip replacement, slipped and fell at home day of arrival and was unable to ambulate. Pt reports associated severe pain in the left hip when she moves her leg. Pt states she normally uses walker but day of fall used a cane prior to falling. She denies any associated chest pain, sob, dizziness, abd pain, vomiting, or fever. Patient BIBA to ED. Pt given morphine in ED which helped relieve her pain.

## 2024-09-11 NOTE — PROGRESS NOTE ADULT - ASSESSMENT
75yo F with a pmhx of DM, HTN, hyperlipidemia, OA,  right THR, slipped and fell at home today and was unable to ambulate. Pt reports associated severe pain in the left hip when she moves her leg. She denies any associated chest pain, sob, dizziness, abd pain, vomiting, or fever. Patient BIBA to ED. Pt given morphine in ED which helped relieve her pain.     Mechanical fall complicated by Left hip fracture   S/P ORIF on 9/9/2024, Pain is well controlled   Evaluated by PT>>STR   Pending Placement     Hypertensin + Type II DM + HLD  BP currently at goal for age and comorbidities  -cont meds from home meds: Toprol, Hold HCTZ, cause fall sin elderly   -Hold Home Glipizide, Restart Home Metformin and insulin sliding scale PRN   -Hold Vytorin in hospital-Continue atorvastatin, DASH and CHO diet   - Keep -180 range     DVT ppx: Lovenox  GI ppx: None  Code: Full Code  Dispo: From [facility], medically ready   pending  rehab Facility placement

## 2024-09-11 NOTE — OCCUPATIONAL THERAPY INITIAL EVALUATION ADULT - LEVEL OF INDEPENDENCE: SHOWER, REHAB EVAL
TBA when appropriate. Pt advised to have family member present when performing transfer and to practice with home care therapist prior to attempting independently. Pt verbalized good understanding and agreement.

## 2024-09-11 NOTE — OCCUPATIONAL THERAPY INITIAL EVALUATION ADULT - LIVES WITH, PROFILE
in private home +3 steps with bilateral handrails to enter +bedroom/bathroom on 1 floor +walk in shower/alone

## 2024-09-11 NOTE — OCCUPATIONAL THERAPY INITIAL EVALUATION ADULT - GENERAL OBSERVATIONS, REHAB EVAL
16:10-16:40. Chart reviewed, ok to treat by Occupational Therapist as confirmed by RN Horacio, Pt received in OOB chair +prima fit +heplock + L hip prevena in NAD.

## 2024-09-12 LAB
GLUCOSE BLDC GLUCOMTR-MCNC: 162 MG/DL — HIGH (ref 70–99)
GLUCOSE BLDC GLUCOMTR-MCNC: 197 MG/DL — HIGH (ref 70–99)
GLUCOSE BLDC GLUCOMTR-MCNC: 236 MG/DL — HIGH (ref 70–99)
GLUCOSE BLDC GLUCOMTR-MCNC: 256 MG/DL — HIGH (ref 70–99)

## 2024-09-12 PROCEDURE — 99232 SBSQ HOSP IP/OBS MODERATE 35: CPT

## 2024-09-12 RX ORDER — INSULIN GLARGINE 100 [IU]/ML
12 INJECTION, SOLUTION SUBCUTANEOUS AT BEDTIME
Refills: 0 | Status: DISCONTINUED | OUTPATIENT
Start: 2024-09-12 | End: 2024-09-13

## 2024-09-12 RX ORDER — INSULIN GLARGINE 100 [IU]/ML
12 INJECTION, SOLUTION SUBCUTANEOUS ONCE
Refills: 0 | Status: COMPLETED | OUTPATIENT
Start: 2024-09-12 | End: 2024-09-12

## 2024-09-12 RX ADMIN — METOPROLOL TARTRATE 100 MILLIGRAM(S): 100 TABLET ORAL at 05:41

## 2024-09-12 RX ADMIN — INSULIN GLARGINE 12 UNIT(S): 100 INJECTION, SOLUTION SUBCUTANEOUS at 11:09

## 2024-09-12 RX ADMIN — METFORMIN HYDROCHLORIDE 1000 MILLIGRAM(S): 850 TABLET, FILM COATED ORAL at 17:06

## 2024-09-12 RX ADMIN — Medication 81 MILLIGRAM(S): at 05:41

## 2024-09-12 RX ADMIN — Medication 5 UNIT(S): at 16:50

## 2024-09-12 RX ADMIN — METFORMIN HYDROCHLORIDE 1000 MILLIGRAM(S): 850 TABLET, FILM COATED ORAL at 05:41

## 2024-09-12 RX ADMIN — Medication 5 UNIT(S): at 11:23

## 2024-09-12 RX ADMIN — Medication 2: at 07:50

## 2024-09-12 RX ADMIN — CHLORHEXIDINE GLUCONATE 1 APPLICATION(S): 40 SOLUTION TOPICAL at 05:41

## 2024-09-12 RX ADMIN — Medication 10 MILLIGRAM(S): at 21:16

## 2024-09-12 RX ADMIN — Medication 1: at 16:50

## 2024-09-12 RX ADMIN — ENOXAPARIN SODIUM 40 MILLIGRAM(S): 100 INJECTION SUBCUTANEOUS at 11:09

## 2024-09-12 RX ADMIN — Medication 81 MILLIGRAM(S): at 17:07

## 2024-09-12 RX ADMIN — INSULIN GLARGINE 12 UNIT(S): 100 INJECTION, SOLUTION SUBCUTANEOUS at 21:39

## 2024-09-12 RX ADMIN — Medication 3: at 11:24

## 2024-09-12 NOTE — PROGRESS NOTE ADULT - ASSESSMENT
75yo F with a pmhx of DM, HTN, hyperlipidemia, OA,  right THR, slipped and fell at home today and was unable to ambulate. Pt reports associated severe pain in the left hip when she moves her leg. She denies any associated chest pain, sob, dizziness, abd pain, vomiting, or fever. Patient BIBA to ED. Pt given morphine in ED which helped relieve her pain.     Mechanical fall complicated by Left hip fracture   S/P ORIF on 9/9/2024, Pain is well controlled   Evaluated by PT>>STR   Pending Placement     Hypertensin + Type II DM + HLD  BP currently at goal for age and comorbidities  -cont meds from home meds: Toprol, Hold HCTZ, cause fall sin elderly   -Hold Home Glipizide, Restart Home Metformin and insulin regimen  -Hold Vytorin in hospital-Continue atorvastatin, DASH and CHO diet   - Keep -180 range     DVT ppx: Lovenox  GI ppx: None  Code: Full Code  Dispo: From [facility], medically ready   pending  rehab Facility placement

## 2024-09-13 ENCOUNTER — TRANSCRIPTION ENCOUNTER (OUTPATIENT)
Age: 74
End: 2024-09-13

## 2024-09-13 VITALS
HEART RATE: 76 BPM | OXYGEN SATURATION: 96 % | DIASTOLIC BLOOD PRESSURE: 67 MMHG | SYSTOLIC BLOOD PRESSURE: 126 MMHG | TEMPERATURE: 98 F | RESPIRATION RATE: 16 BRPM

## 2024-09-13 LAB
GLUCOSE BLDC GLUCOMTR-MCNC: 176 MG/DL — HIGH (ref 70–99)
GLUCOSE BLDC GLUCOMTR-MCNC: 201 MG/DL — HIGH (ref 70–99)
GLUCOSE BLDC GLUCOMTR-MCNC: 229 MG/DL — HIGH (ref 70–99)
GLUCOSE BLDC GLUCOMTR-MCNC: 262 MG/DL — HIGH (ref 70–99)

## 2024-09-13 PROCEDURE — 99239 HOSP IP/OBS DSCHRG MGMT >30: CPT

## 2024-09-13 RX ORDER — ASPIRIN 81 MG
1 TABLET, DELAYED RELEASE (ENTERIC COATED) ORAL
Qty: 50 | Refills: 0
Start: 2024-09-13 | End: 2024-10-07

## 2024-09-13 RX ORDER — NIFEDIPINE 60 MG/1
30 TABLET, FILM COATED, EXTENDED RELEASE ORAL ONCE
Refills: 0 | Status: COMPLETED | OUTPATIENT
Start: 2024-09-13 | End: 2024-09-13

## 2024-09-13 RX ORDER — NIFEDIPINE 60 MG/1
1 TABLET, FILM COATED, EXTENDED RELEASE ORAL
Qty: 30 | Refills: 0
Start: 2024-09-13 | End: 2024-10-12

## 2024-09-13 RX ADMIN — METOPROLOL TARTRATE 100 MILLIGRAM(S): 100 TABLET ORAL at 05:27

## 2024-09-13 RX ADMIN — Medication 81 MILLIGRAM(S): at 05:27

## 2024-09-13 RX ADMIN — Medication 2: at 12:08

## 2024-09-13 RX ADMIN — NIFEDIPINE 30 MILLIGRAM(S): 60 TABLET, FILM COATED, EXTENDED RELEASE ORAL at 14:03

## 2024-09-13 RX ADMIN — Medication 5 UNIT(S): at 12:08

## 2024-09-13 RX ADMIN — ENOXAPARIN SODIUM 40 MILLIGRAM(S): 100 INJECTION SUBCUTANEOUS at 10:50

## 2024-09-13 RX ADMIN — METFORMIN HYDROCHLORIDE 1000 MILLIGRAM(S): 850 TABLET, FILM COATED ORAL at 05:28

## 2024-09-13 RX ADMIN — Medication 2: at 08:03

## 2024-09-13 RX ADMIN — Medication 5 UNIT(S): at 08:03

## 2024-09-13 RX ADMIN — CHLORHEXIDINE GLUCONATE 1 APPLICATION(S): 40 SOLUTION TOPICAL at 05:27

## 2024-09-13 NOTE — DISCHARGE NOTE PROVIDER - ATTENDING DISCHARGE PHYSICAL EXAMINATION:
GENERAL: No acute distress, well-developed  HEAD:  Atraumatic, Normocephalic  EYES: onjunctiva and sclera clear  NECK: Supple, , no JVD  CHEST/LUNG: CTAB; No wheezes, rales, or rhonchi  HEART: Regular rate and rhythm; No murmurs, rubs, or gallops  ABDOMEN: Soft, non-tender, non-distended; normal bowel sounds,   EXTREMITIES: No clubbing, cyanosis, or edema, Left Hip Dressing, clean and dry, denies pain   NEUROLOGY: A&O x 3, no focal deficits  SKIN: No rashes or lesions

## 2024-09-13 NOTE — DISCHARGE NOTE PROVIDER - NSDCMRMEDTOKEN_GEN_ALL_CORE_FT
glipiZIDE 10 mg oral tablet: 1 tab(s) orally once a day  hydroCHLOROthiazide 25 mg oral tablet: 1 tab(s) orally once a day  metFORMIN 1000 mg oral tablet: 1 tab(s) orally 2 times a day  metoprolol succinate 100 mg oral tablet, extended release: 1 tab(s) orally once a day  Vytorin 10 mg-40 mg oral tablet: 1 tab(s) orally once a day   glipiZIDE 10 mg oral tablet: 1 tab(s) orally once a day  metFORMIN 1000 mg oral tablet: 1 tab(s) orally 2 times a day  metoprolol succinate 100 mg oral tablet, extended release: 1 tab(s) orally once a day  Vytorin 10 mg-40 mg oral tablet: 1 tab(s) orally once a day   aspirin 81 mg oral delayed release tablet: 1 tab(s) orally 2 times a day  glipiZIDE 10 mg oral tablet: 1 tab(s) orally once a day  metFORMIN 1000 mg oral tablet: 1 tab(s) orally 2 times a day  metoprolol succinate 100 mg oral tablet, extended release: 1 tab(s) orally once a day  NIFEdipine (Eqv-Procardia XL) 30 mg oral tablet, extended release: 1 tab(s) orally once a day  Vytorin 10 mg-40 mg oral tablet: 1 tab(s) orally once a day

## 2024-09-13 NOTE — DISCHARGE NOTE PROVIDER - NSDCCPCAREPLAN_GEN_ALL_CORE_FT
PRINCIPAL DISCHARGE DIAGNOSIS  Diagnosis: Fractured hip  Assessment and Plan of Treatment: You were seen and evaluated for a fall resulting in a fractured left hip. You are status post total left hip arthoplasty. You are able to ambulate out of bed and pain is improving. Please follow up with your doctor for wound care and dressing change in 5 days. If you develop any pain, high fever, shortness of breath, drainage from surgical wound or worsening symptoms please go to nearest ER or call 911.     PRINCIPAL DISCHARGE DIAGNOSIS  Diagnosis: Fractured hip  Assessment and Plan of Treatment: You were seen and evaluated for a fall resulting in a fractured left hip. You are status post total left hip arthoplasty. You are able to ambulate out of bed and pain is improving. Please follow up with your doctor for wound care and dressing change in 5 days. If you develop any pain, high fever, shortness of breath, drainage from surgical wound or worsening symptoms please go to nearest ER or call 911.      SECONDARY DISCHARGE DIAGNOSES  Diagnosis: HTN (hypertension)  Assessment and Plan of Treatment: Continue with home topril.   Hydrochlorothiazide was discontinue as it can cause fall risk in elderly patient   Will start _____________________once daily, prescription sent to pharmacy.  Follow up with primary for routine BP monitoring     PRINCIPAL DISCHARGE DIAGNOSIS  Diagnosis: Fractured hip  Assessment and Plan of Treatment: You were seen and evaluated for a fall resulting in a fractured left hip. You are status post total left hip arthoplasty. You are able to ambulate out of bed and pain is improving. Please follow up with your doctor for wound care and dressing change in 5 days. If you develop any pain, high fever, shortness of breath, drainage from surgical wound or worsening symptoms please go to nearest ER or call 911.      SECONDARY DISCHARGE DIAGNOSES  Diagnosis: HTN (hypertension)  Assessment and Plan of Treatment: Continue with home topril.   Hydrochlorothiazide was discontinue as it can cause fall risk in elderly patient   Will start Nifedipine 30mg XL once daily, prescription sent to pharmacy.  Follow up with primary for routine BP monitoring

## 2024-09-13 NOTE — DISCHARGE NOTE PROVIDER - NSDCFUSCHEDAPPT_GEN_ALL_CORE_FT
Shayan Zuniga  [facility] Physician [facility]  ORTHOSURG [address]  Scheduled Appointment: 09/16/2024

## 2024-09-13 NOTE — DISCHARGE NOTE NURSING/CASE MANAGEMENT/SOCIAL WORK - PATIENT PORTAL LINK FT
You can access the FollowMyHealth Patient Portal offered by [facility] by registering at the following website: [email or web address]. By joining [employer]’s FollowMyHealth portal, you will also be able to view your health information using other applications (apps) compatible with our system.

## 2024-09-13 NOTE — PROGRESS NOTE ADULT - REASON FOR ADMISSION
Fall
Total Hip Arthroplasty for femoral neck fracture
Total Hip Arthroplasty for femoral neck fracture
Fall

## 2024-09-13 NOTE — DISCHARGE NOTE PROVIDER - CARE PROVIDER_API CALL
Pavan Castellanos J  Internal Medicine  [address] [place] [address]  Phone: [phone]  Fax: [phone]  Follow Up Time:

## 2024-09-13 NOTE — DISCHARGE NOTE PROVIDER - HOSPITAL COURSE
Presentation:  This is a 74 year old female with a past medical history of DM, HTN, HLD, OA, right THR presented to the ED for a fall at home and unable to ambulate.  On presentation patient reported severe pain in left hip exacerbated with movement.     Diagnostics and Imaging:   Labs were unremarkable.   CT of head and neck were unremarkable.   Chest x-ray unremarkable.   CT of the left femur and pelvis showed acute femoral neck fracture of left hip, no dislocation.   X-ray of hip and pelvis was consistent with the results of the CT of left femur and pelvis.   EKG was NSR.     Post-op care:  Patient was evaluated and seen s/p left total hip arthroplasty for femoral neck fracture and is doing well.   Post-op antibiotics were completed and pain control was given prn.   Patient can continue Aspirin 21 mg BID for DVT prophylaxis and continue the Protonix.   Can continue home metformin, insulin regimen, and atorvastatin.     Discharge:  Patient is able to ambulate out of bed to chair and is medically stable for discharge per ortho.   Follow up with Dr. Zuniga in 5 days for wound check and dressing change. This is a 74 year old female with a past medical history of DM, HTN, HLD, OA, right THR presented to the ED for a fall at home and unable to ambulate.  On presentation patient reported severe pain in left hip exacerbated with movement.     Diagnostics and Imaging:   Labs were unremarkable.   CT of head and neck were unremarkable.   Chest x-ray unremarkable.   CT of the left femur and pelvis showed acute femoral neck fracture of left hip, no dislocation.   X-ray of hip and pelvis was consistent with the results of the CT of left femur and pelvis.   EKG was NSR.     Post-op care:  Patient was evaluated and seen s/p left total hip arthroplasty for femoral neck fracture and is doing well.   Post-op antibiotics were completed and pain control was given prn.   Patient can continue Aspirin 21 mg BID for DVT prophylaxis and continue the Protonix.   Can continue home metformin, insulin regimen, and atorvastatin.     Discharge:  Patient is able to ambulate out of bed to chair and is medically stable for discharge per ortho.   Follow up with Dr. Zuniga in 5 days for wound check and dressing change. 75yo F with a pmhx of DM, HTN, hyperlipidemia, OA,  right THR, slipped and fell at home today and was unable to ambulate. Pt reports associated severe pain in the left hip when she moves her leg. She denies any associated chest pain, sob, dizziness, abd pain, vomiting, or fever. Patient BIBA to ED. Pt given morphine in ED which helped relieve her pain.     #Mechanical fall complicated by Left hip fracture   - ortho following   - Patient was evaluated and seen s/p left total hip arthroplasty for femoral neck fracture and is doing well.   - Post-op antibiotics were completed and pain control was given prn.   - Patient can continue Aspirin 81 mg BID for DVT prophylaxis and continue the Protonix.   - Evaluated by PT, will dc to STR   - Follow up with Dr. Zuniga in 5 days for wound check and dressing change.     #Hypertensin + Type II DM + HLD  - BP currently at goal for age and comorbidities  - cont homer Toprol, will discontinue HCTZ cause falls in elderly, will discharge on nifedipine XL 30??  - Hold Home Glipizide, Restart Home Metformin and insulin regimen  - Hold Vytorin in hospital-Continue atorvastatin, DASH and CHO diet   - Keep -180 range 73yo F with a pmhx of DM, HTN, hyperlipidemia, OA,  right THR, slipped and fell at home today and was unable to ambulate. Pt reports associated severe pain in the left hip when she moves her leg. She denies any associated chest pain, sob, dizziness, abd pain, vomiting, or fever. Patient BIBA to ED. Pt given morphine in ED which helped relieve her pain.     #Mechanical fall complicated by Left hip fracture   - ortho following   - Patient was evaluated and seen s/p left total hip arthroplasty for femoral neck fracture and is doing well.   - Post-op antibiotics were completed and pain control was given prn.   - Patient can continue Aspirin 81 mg BID for DVT prophylaxis and continue the Protonix.   - Evaluated by PT, will dc to STR   - Follow up with Dr. Zuniga in 5 days for wound check and dressing change.     #Hypertensin + Type II DM + HLD  - BP currently at goal for age and comorbidities  - cont homer Toprol, will discontinue HCTZ cause falls in elderly, will discharge on nifedipine XL 30  - Norvasc allergy noted- reported dry cough, will send rx for nifedipine, if develops cough FU w/ PCP for alternative medication for BP - patient agreeable   - Hold Home Glipizide, Restart Home Metformin and insulin regimen  - Hold Vytorin in hospital-Continue atorvastatin, DASH and CHO diet   - Keep -180 range

## 2024-09-13 NOTE — DISCHARGE NOTE PROVIDER - TIME SPENT: (MINUTES SPENT ON THE DISCHARGE SERVICE)
Called pt in regards to confirming her original appt for 1/22/2020. Patient stated she would not be able to come tomorrow and missed her labs. Pt is rescheduled for labs 1/22/2020 and appt 1/24/2020. Pt voiced understanding.    35

## 2024-09-13 NOTE — PROGRESS NOTE ADULT - SUBJECTIVE AND OBJECTIVE BOX
74y Female POD #  3    S/P left hip Total Hip Arthroplasty for femoral neck fx     Patient seen and examined at bedside . The patient is awake and alert in NAD. No complaints of chest pain, SOB, N/V.  Pain is controlled, the patient has ambulated and is voiding.     PAST MEDICAL & SURGICAL HISTORY:  DM (diabetes mellitus)    Hypercholesteremia    HTN (hypertension)    OA (osteoarthritis)    History of joint surgery  Right HIP ORIF  [facility] ([facility])    History of tonsillectomy and adenoidectomy  1957          MEDICATIONS  (STANDING):  aspirin enteric coated 81 milliGRAM(s) Oral two times a day  atorvastatin 10 milliGRAM(s) Oral at bedtime  chlorhexidine 2% Cloths 1 Application(s) Topical <User Schedule>  dextrose 50% Injectable 12.5 Gram(s) IV Push once  dextrose 50% Injectable 25 Gram(s) IV Push once  dextrose 50% Injectable 25 Gram(s) IV Push once  enoxaparin Injectable 40 milliGRAM(s) SubCutaneous every 24 hours  glucagon  Injectable 1 milliGRAM(s) IntraMuscular once  insulin lispro (ADMELOG) corrective regimen sliding scale   SubCutaneous at bedtime  insulin lispro (ADMELOG) corrective regimen sliding scale   SubCutaneous three times a day before meals  metFORMIN 1000 milliGRAM(s) Oral two times a day  metoprolol succinate  milliGRAM(s) Oral daily    MEDICATIONS  (PRN):  acetaminophen     Tablet .. 650 milliGRAM(s) Oral every 6 hours PRN Temp greater or equal to 38C (100.4F), Mild Pain (1 - 3)  aluminum hydroxide/magnesium hydroxide/simethicone Suspension 30 milliLiter(s) Oral every 4 hours PRN Dyspepsia  dextrose Oral Gel 15 Gram(s) Oral once PRN Blood Glucose LESS THAN 70 milliGRAM(s)/deciliter  melatonin 3 milliGRAM(s) Oral at bedtime PRN Insomnia        Vital Signs Last 24 Hrs  T(C): 37.5 (12 Sep 2024 04:37), Max: 37.6 (11 Sep 2024 21:21)  T(F): 99.5 (12 Sep 2024 04:37), Max: 99.6 (11 Sep 2024 21:21)  HR: 81 (12 Sep 2024 04:37) (75 - 87)  BP: 154/63 (12 Sep 2024 04:37) (122/64 - 154/63)  BP(mean): --  RR: 16 (12 Sep 2024 04:37) (16 - 18)  SpO2: 96% (11 Sep 2024 21:21) (96% - 99%)                          9.6    8.70  )-----------( 199      ( 10 Sep 2024 09:50 )             30.0     09-10    135  |  97<L>  |  24<H>  ----------------------------<  283<H>  4.2   |  24  |  1.1    Ca    8.0<L>      10 Sep 2024 09:50  Mg     1.6     09-10    TPro  6.3  /  Alb  3.8  /  TBili  0.3  /  DBili  x   /  AST  17  /  ALT  10  /  AlkPhos  130<H>  09-10      Urinalysis Basic - ( 10 Sep 2024 09:50 )    Color: x / Appearance: x / SG: x / pH: x  Gluc: 283 mg/dL / Ketone: x  / Bili: x / Urobili: x   Blood: x / Protein: x / Nitrite: x   Leuk Esterase: x / RBC: x / WBC x   Sq Epi: x / Non Sq Epi: x / Bacteria: x            PE:  The patient was seen and examined at bedside          A&OX3, NAD          left hip prevena dressing + suction , canister empty          Compartments soft, BLE Armand stockings and SCD in place          NVI, SILT           A/P:     # POD #  3     s/p left Total Hip Arthroplasty for femoral neck fx                 - OOB to Chair   -PT/OT - wbat  -post op abx completed  -Pain control - per pain protocol   -Incentive Spirometry   -DVT Prophylaxis - for 30 days post op , if going home can give aspirin 21 mg bid  -GI ppx- continue Protonix  -discharge planning- ortho stable for discharged    upon discharge she should follow up with Dr. Zuniga in 5 days for wound check and dressing change  [phone]                             
ALLEN SCHULZ 74y Female  MRN#: [id number]     SUBJECTIVE  Patient is a 74y old Female who presents with a chief complaint of   Interval/Overnight Events:    Today is hospital day 2d, and this morning she is lying in bed without distress.   No acute overnight events.     OBJECTIVE  PAST MEDICAL & SURGICAL HISTORY  DM (diabetes mellitus)    Hypercholesteremia    HTN (hypertension)    OA (osteoarthritis)    History of joint surgery  Right HIP ORIF  [facility]    History of tonsillectomy and adenoidectomy  1957      ALLERGIES:  erythromycin (Unknown)  Norvasc (Other)    MEDICATIONS:  STANDING MEDICATIONS  aspirin enteric coated 81 milliGRAM(s) Oral two times a day  atorvastatin 10 milliGRAM(s) Oral at bedtime  ceFAZolin   IVPB 2000 milliGRAM(s) IV Intermittent every 8 hours  chlorhexidine 2% Cloths 1 Application(s) Topical <User Schedule>  dextrose 50% Injectable 25 Gram(s) IV Push once  dextrose 50% Injectable 12.5 Gram(s) IV Push once  dextrose 50% Injectable 25 Gram(s) IV Push once  glucagon  Injectable 1 milliGRAM(s) IntraMuscular once  insulin lispro (ADMELOG) corrective regimen sliding scale   SubCutaneous at bedtime  insulin lispro (ADMELOG) corrective regimen sliding scale   SubCutaneous three times a day before meals  metoprolol succinate  milliGRAM(s) Oral daily  sodium chloride 0.9%. 1000 milliLiter(s) IV Continuous <Continuous>    PRN MEDICATIONS  acetaminophen     Tablet .. 650 milliGRAM(s) Oral every 6 hours PRN  aluminum hydroxide/magnesium hydroxide/simethicone Suspension 30 milliLiter(s) Oral every 4 hours PRN  dextrose Oral Gel 15 Gram(s) Oral once PRN  melatonin 3 milliGRAM(s) Oral at bedtime PRN    HOME MEDICATIONS  Home Medications:  glipiZIDE 10 mg oral tablet: 1 tab(s) orally once a day (08 Sep 2024 14:52)  hydroCHLOROthiazide 25 mg oral tablet: 1 tab(s) orally once a day (08 Sep 2024 14:50)  metFORMIN 1000 mg oral tablet: 1 tab(s) orally 2 times a day (08 Sep 2024 14:50)  metoprolol succinate 100 mg oral tablet, extended release: 1 tab(s) orally once a day (08 Sep 2024 14:50)  Vytorin 10 mg-40 mg oral tablet: 1 tab(s) orally once a day (08 Sep 2024 14:52)      LABS:                        11.1   7.55  )-----------( 225      ( 09 Sep 2024 07:20 )             34.1     09-09    132<L>  |  93<L>  |  19  ----------------------------<  190<H>  4.4   |  28  |  0.7    Ca    9.1      09 Sep 2024 07:20    TPro  7.0  /  Alb  4.3  /  TBili  0.3  /  DBili  x   /  AST  29  /  ALT  22  /  AlkPhos  173<H>  09-08    LIVER FUNCTIONS - ( 08 Sep 2024 11:44 )  Alb: 4.3 g/dL / Pro: 7.0 g/dL / ALK PHOS: 173 U/L / ALT: 22 U/L / AST: 29 U/L / GGT: x           PT/INR - ( 08 Sep 2024 11:44 )   PT: 11.60 sec;   INR: 1.02 ratio         PTT - ( 08 Sep 2024 11:44 )  PTT:31.1 sec  Urinalysis Basic - ( 09 Sep 2024 07:20 )    Color: x / Appearance: x / SG: x / pH: x  Gluc: 190 mg/dL / Ketone: x  / Bili: x / Urobili: x   Blood: x / Protein: x / Nitrite: x   Leuk Esterase: x / RBC: x / WBC x   Sq Epi: x / Non Sq Epi: x / Bacteria: x                CAPILLARY BLOOD GLUCOSE      POCT Blood Glucose.: 291 mg/dL (10 Sep 2024 07:26)      PHYSICAL EXAM:  T(C): 36.4 (09-10-24 @ 04:39), Max: 36.8 (09-09-24 @ 13:56)  HR: 76 (09-10-24 @ 04:39) (76 - 98)  BP: 133/69 (09-10-24 @ 04:39) (114/54 - 198/80)  RR: 18 (09-10-24 @ 04:39) (12 - 22)  SpO2: 98% (09-10-24 @ 04:39) (88% - 99%)    GENERAL: NAD, lying in bed comfortably  CHEST/LUNG: Clear to auscultation bilaterally; No rales, rhonchi, wheezing, or rubs. Unlabored respirations  HEART: Regular rate and rhythm; No murmurs, rubs, or gallops  ABDOMEN: Soft, nontender, nondistended  EXTREMITIES:  No clubbing, cyanosis, or edema, left hip tenderness   NERVOUS SYSTEM:  A&Ox3    ADMISSION SUMMARY  Patient is a 74y old Female who presents with a chief complaint of  
Patient is a 74y old  Female who presents with a chief complaint of Total Hip Arthroplasty for femoral neck fracture (12 Sep 2024 08:43)      MEDICATIONS  (STANDING):  aspirin enteric coated 81 milliGRAM(s) Oral two times a day  atorvastatin 10 milliGRAM(s) Oral at bedtime  chlorhexidine 2% Cloths 1 Application(s) Topical <User Schedule>  dextrose 50% Injectable 12.5 Gram(s) IV Push once  dextrose 50% Injectable 25 Gram(s) IV Push once  dextrose 50% Injectable 25 Gram(s) IV Push once  enoxaparin Injectable 40 milliGRAM(s) SubCutaneous every 24 hours  glucagon  Injectable 1 milliGRAM(s) IntraMuscular once  insulin glargine Injectable (LANTUS) 12 Unit(s) SubCutaneous at bedtime  insulin glargine Injectable (LANTUS) 12 Unit(s) SubCutaneous once  insulin lispro (ADMELOG) corrective regimen sliding scale   SubCutaneous three times a day before meals  insulin lispro Injectable (ADMELOG) 5 Unit(s) SubCutaneous three times a day before meals  metFORMIN 1000 milliGRAM(s) Oral two times a day  metoprolol succinate  milliGRAM(s) Oral daily    MEDICATIONS  (PRN):  acetaminophen     Tablet .. 650 milliGRAM(s) Oral every 6 hours PRN Temp greater or equal to 38C (100.4F), Mild Pain (1 - 3)  aluminum hydroxide/magnesium hydroxide/simethicone Suspension 30 milliLiter(s) Oral every 4 hours PRN Dyspepsia  dextrose Oral Gel 15 Gram(s) Oral once PRN Blood Glucose LESS THAN 70 milliGRAM(s)/deciliter  melatonin 3 milliGRAM(s) Oral at bedtime PRN Insomnia      CAPILLARY BLOOD GLUCOSE      POCT Blood Glucose.: 236 mg/dL (12 Sep 2024 07:36)  POCT Blood Glucose.: 225 mg/dL (11 Sep 2024 21:10)  POCT Blood Glucose.: 235 mg/dL (11 Sep 2024 16:53)  POCT Blood Glucose.: 242 mg/dL (11 Sep 2024 11:42)    I&O's Summary    11 Sep 2024 07:01  -  12 Sep 2024 07:00  --------------------------------------------------------  IN: 0 mL / OUT: 600 mL / NET: -600 mL      PHYSICAL EXAM:  Vital Signs Last 24 Hrs  T(C): 37.5 (12 Sep 2024 04:37), Max: 37.6 (11 Sep 2024 21:21)  T(F): 99.5 (12 Sep 2024 04:37), Max: 99.6 (11 Sep 2024 21:21)  HR: 81 (12 Sep 2024 04:37) (75 - 87)  BP: 154/63 (12 Sep 2024 04:37) (122/64 - 154/63)  BP(mean): --  RR: 16 (12 Sep 2024 04:37) (16 - 18)  SpO2: 96% (11 Sep 2024 21:21) (96% - 99%)    Parameters below as of 11 Sep 2024 21:21  Patient On (Oxygen Delivery Method): nasal cannula  O2 Flow (L/min): 2          GENERAL: No acute distress, well-developed  HEAD:  Atraumatic, Normocephalic  EYES: onjunctiva and sclera clear  NECK: Supple, , no JVD  CHEST/LUNG: CTAB; No wheezes, rales, or rhonchi  HEART: Regular rate and rhythm; No murmurs, rubs, or gallops  ABDOMEN: Soft, non-tender, non-distended; normal bowel sounds,   EXTREMITIES: No clubbing, cyanosis, or edema, Left Hip Dressing, clean and dry, denies pain   NEUROLOGY: A&O x 3, no focal deficits  SKIN: No rashes or lesions      LABS:                        9.6    8.70  )-----------( 199      ( 10 Sep 2024 09:50 )             30.0     09-10    135  |  97<L>  |  24<H>  ----------------------------<  283<H>  4.2   |  24  |  1.1    Ca    8.0<L>      10 Sep 2024 09:50  Mg     1.6     09-10    TPro  6.3  /  Alb  3.8  /  TBili  0.3  /  DBili  x   /  AST  17  /  ALT  10  /  AlkPhos  130<H>  09-10      Urinalysis Basic - ( 10 Sep 2024 09:50 )    Color: x / Appearance: x / SG: x / pH: x  Gluc: 283 mg/dL / Ketone: x  / Bili: x / Urobili: x   Blood: x / Protein: x / Nitrite: x   Leuk Esterase: x / RBC: x / WBC x   Sq Epi: x / Non Sq Epi: x / Bacteria: x    
Pt. seen/ examined  Labs/ vitals reviewed, discussed with anesthesia/ Dr. Andre  Side/ site marked  Discussed risks/ benefits/ alternatives of the procedure  Consent obtained  Will proceed to OR
ALLEN SCHULZ 74y Female  MRN#: [id number]     Hospital Day: 1d      SUBJECTIVE  No acute events overnight, pt seen and examined this am, some mild pain in hip, 5/10, does not want anything for pain, states she is "NPO" for the surgery, she is a former nurse                                           ----------------------------------------------------------  OBJECTIVE  PAST MEDICAL & SURGICAL HISTORY  DM (diabetes mellitus)    Hypercholesteremia    HTN (hypertension)    OA (osteoarthritis)    History of joint surgery  Right HIP ORIF  [facility]    History of tonsillectomy and adenoidectomy  1957                                              -----------------------------------------------------------  ALLERGIES:  erythromycin (Unknown)  Norvasc (Other)                                            ------------------------------------------------------------    HOME MEDICATIONS  Home Medications:  glipiZIDE 10 mg oral tablet: 1 tab(s) orally once a day (08 Sep 2024 14:52)  hydroCHLOROthiazide 25 mg oral tablet: 1 tab(s) orally once a day (08 Sep 2024 14:50)  metFORMIN 1000 mg oral tablet: 1 tab(s) orally 2 times a day (08 Sep 2024 14:50)  metoprolol succinate 100 mg oral tablet, extended release: 1 tab(s) orally once a day (08 Sep 2024 14:50)  Vytorin 10 mg-40 mg oral tablet: 1 tab(s) orally once a day (08 Sep 2024 14:52)                           MEDICATIONS:  STANDING MEDICATIONS  atorvastatin 10 milliGRAM(s) Oral at bedtime  chlorhexidine 2% Cloths 1 Application(s) Topical <User Schedule>  chlorhexidine 4% Liquid 1 Application(s) Topical <User Schedule>  dextrose 5%. 1000 milliLiter(s) IV Continuous <Continuous>  dextrose 5%. 1000 milliLiter(s) IV Continuous <Continuous>  dextrose 50% Injectable 25 Gram(s) IV Push once  dextrose 50% Injectable 25 Gram(s) IV Push once  dextrose 50% Injectable 12.5 Gram(s) IV Push once  enoxaparin Injectable 40 milliGRAM(s) SubCutaneous every 24 hours  glucagon  Injectable 1 milliGRAM(s) IntraMuscular once  insulin lispro (ADMELOG) corrective regimen sliding scale   SubCutaneous at bedtime  insulin lispro (ADMELOG) corrective regimen sliding scale   SubCutaneous three times a day before meals  metoprolol succinate  milliGRAM(s) Oral daily  nystatin Powder 1 Application(s) Topical two times a day  ondansetron Injectable 4 milliGRAM(s) IV Push once  sodium chloride 0.9%. 1000 milliLiter(s) IV Continuous <Continuous>    PRN MEDICATIONS  acetaminophen     Tablet .. 650 milliGRAM(s) Oral every 6 hours PRN  aluminum hydroxide/magnesium hydroxide/simethicone Suspension 30 milliLiter(s) Oral every 4 hours PRN  dextrose Oral Gel 15 Gram(s) Oral once PRN  melatonin 3 milliGRAM(s) Oral at bedtime PRN  morphine  - Injectable 2 milliGRAM(s) IV Push every 4 hours PRN  ondansetron Injectable 4 milliGRAM(s) IV Push every 8 hours PRN  oxycodone    5 mG/acetaminophen 325 mG 1 Tablet(s) Oral every 6 hours PRN                                            ------------------------------------------------------------  VITAL SIGNS: Last 24 Hours  T(C): 36.9 (09 Sep 2024 05:09), Max: 37.7 (08 Sep 2024 20:12)  T(F): 98.4 (09 Sep 2024 05:09), Max: 99.8 (08 Sep 2024 20:12)  HR: 76 (09 Sep 2024 12:05) (68 - 87)  BP: 154/71 (09 Sep 2024 12:05) (154/71 - 184/87)  BP(mean): --  RR: 18 (09 Sep 2024 12:05) (18 - 18)  SpO2: 95% (09 Sep 2024 12:05) (88% - 97%)      09-08-24 @ 07:01  -  09-09-24 @ 07:00  --------------------------------------------------------  IN: 0 mL / OUT: 500 mL / NET: -500 mL                                             --------------------------------------------------------------  LABS:                        11.1   7.55  )-----------( 225      ( 09 Sep 2024 07:20 )             34.1     09-09    132<L>  |  93<L>  |  19  ----------------------------<  190<H>  4.4   |  28  |  0.7    Ca    9.1      09 Sep 2024 07:20    TPro  7.0  /  Alb  4.3  /  TBili  0.3  /  DBili  x   /  AST  29  /  ALT  22  /  AlkPhos  173<H>  09-08    PT/INR - ( 08 Sep 2024 11:44 )   PT: 11.60 sec;   INR: 1.02 ratio         PTT - ( 08 Sep 2024 11:44 )  PTT:31.1 sec  Urinalysis Basic - ( 09 Sep 2024 07:20 )    Color: x / Appearance: x / SG: x / pH: x  Gluc: 190 mg/dL / Ketone: x  / Bili: x / Urobili: x   Blood: x / Protein: x / Nitrite: x   Leuk Esterase: x / RBC: x / WBC x   Sq Epi: x / Non Sq Epi: x / Bacteria: x                                                            -------------------------------------------------------------  RADIOLOGY:  CXR  Xray Chest 1 View- PORTABLE-Urgent:   ACC: [id number] EXAM:  XR CHEST PORTABLE URGENT 1V   ORDERED BY: YASHIRA RINCON     PROCEDURE DATE:  09/08/2024          INTERPRETATION:  Clinical History / Reason for exam: Trauma    Comparison : Chest radiograph December 19, 2018.    Technique/Positioning: Adequate.    Findings:    Support devices: None.    Cardiac/mediastinum/hilum: Stable    Lung parenchyma/Pleura: Within normal limits.    Skeleton/soft tissues: Stable    Impression:    No radiographic evidence of acute cardiopulmonary disease.        --- End of Report ---            DAMARIS VELAZQUEZ MD; Attending Radiologist  This document has been electronically signed. Sep  8 2024  2:13PM (09-08-24 @ 11:59)      CT                                            --------------------------------------------------------------    PHYSICAL EXAM:  GENERAL: NAD, lying in bed comfortably  CHEST/LUNG: Clear to auscultation bilaterally; No rales, rhonchi, wheezing, or rubs. Unlabored respirations  HEART: Regular rate and rhythm; No murmurs, rubs, or gallops  ABDOMEN: Soft, nontender, nondistended  EXTREMITIES:  No clubbing, cyanosis, or edema, left hip tenderness   NERVOUS SYSTEM:  A&Ox3                                               --------------------------------------------------------------      
Pt. seen/ examined  Labs/ vitals reviewed  Comfortably sitting in a chair, pain well-controlled  Participated with PT  NVID LLE  Prevena functional  Discussed surgery, prognosis, plan  Care per medicine team  DVT prx, WBAT LLE  WIll follow dispo planning
Orthopedic Surgery progress Note    74y Female POD #  4 S/P left hip Total Hip Arthroplasty for femoral neck fx     Patient seen and examined at bedside . The patient is awake and alert in NAD. No complaints of chest pain, SOB, N/V.  Pain is controlled, the patient has ambulated and is voiding.       Vital Signs Last 24 Hrs  T(C): 36.7 (13 Sep 2024 05:19), Max: 36.8 (12 Sep 2024 14:18)  T(F): 98 (13 Sep 2024 05:19), Max: 98.3 (12 Sep 2024 14:18)  HR: 70 (13 Sep 2024 05:19) (69 - 75)  BP: 153/62 (13 Sep 2024 05:19) (133/67 - 153/62)  BP(mean): --  RR: 16 (13 Sep 2024 05:19) (16 - 16)  SpO2: 95% (13 Sep 2024 05:19) (95% - 97%)    Parameters below as of 12 Sep 2024 21:59  Patient On (Oxygen Delivery Method): room air      Labs:  pending AM labs   previous labs have been stable           PE:  The patient was seen and examined at bedside          A&OX3, NAD          left hip prevena dressing + suction , canister empty          Compartments soft, BLE Armand stockings and SCD in place          NVI, SILT       A/P:     # POD #4   s/p left Total Hip Arthroplasty for femoral neck fx                 - OOB to Chair   -PT/OT - wbat  -post op abx completed  -Pain control - per pain protocol   -Incentive Spirometry   -DVT Prophylaxis - for 30 days post op , if going home can give aspirin 21 mg bid  -GI ppx- continue Protonix  -discharge planning- ortho stable for discharged    upon discharge she should follow up with Dr. Zuniga in 5 days for wound check and dressing change  [phone]                             
Patient is a 74y old  Female who presents with a chief complaint of         MEDICATIONS  (STANDING):  aspirin enteric coated 81 milliGRAM(s) Oral two times a day  atorvastatin 10 milliGRAM(s) Oral at bedtime  chlorhexidine 2% Cloths 1 Application(s) Topical <User Schedule>  dextrose 50% Injectable 12.5 Gram(s) IV Push once  dextrose 50% Injectable 25 Gram(s) IV Push once  dextrose 50% Injectable 25 Gram(s) IV Push once  glucagon  Injectable 1 milliGRAM(s) IntraMuscular once  insulin lispro (ADMELOG) corrective regimen sliding scale   SubCutaneous at bedtime  insulin lispro (ADMELOG) corrective regimen sliding scale   SubCutaneous three times a day before meals  metoprolol succinate  milliGRAM(s) Oral daily  sodium chloride 0.9%. 1000 milliLiter(s) (60 mL/Hr) IV Continuous <Continuous>    MEDICATIONS  (PRN):  acetaminophen     Tablet .. 650 milliGRAM(s) Oral every 6 hours PRN Temp greater or equal to 38C (100.4F), Mild Pain (1 - 3)  aluminum hydroxide/magnesium hydroxide/simethicone Suspension 30 milliLiter(s) Oral every 4 hours PRN Dyspepsia  dextrose Oral Gel 15 Gram(s) Oral once PRN Blood Glucose LESS THAN 70 milliGRAM(s)/deciliter  melatonin 3 milliGRAM(s) Oral at bedtime PRN Insomnia      CAPILLARY BLOOD GLUCOSE      POCT Blood Glucose.: 242 mg/dL (11 Sep 2024 11:42)  POCT Blood Glucose.: 246 mg/dL (11 Sep 2024 07:49)  POCT Blood Glucose.: 216 mg/dL (10 Sep 2024 20:34)  POCT Blood Glucose.: 224 mg/dL (10 Sep 2024 16:33)    I&O's Summary      PHYSICAL EXAM:  Vital Signs Last 24 Hrs  T(C): 37 (11 Sep 2024 05:32), Max: 37 (11 Sep 2024 05:32)  T(F): 98.6 (11 Sep 2024 05:32), Max: 98.6 (11 Sep 2024 05:32)  HR: 76 (11 Sep 2024 05:32) (76 - 81)  BP: 137/50 (11 Sep 2024 05:32) (134/71 - 137/50)  BP(mean): --  RR: 16 (11 Sep 2024 05:32) (16 - 18)  SpO2: 96% (11 Sep 2024 05:32) (92% - 96%)    Parameters below as of 10 Sep 2024 20:48  Patient On (Oxygen Delivery Method): nasal cannula  O2 Flow (L/min): 2      GENERAL: No acute distress, well-developed  HEAD:  Atraumatic, Normocephalic  EYES: onjunctiva and sclera clear  NECK: Supple, , no JVD  CHEST/LUNG: CTAB; No wheezes, rales, or rhonchi  HEART: Regular rate and rhythm; No murmurs, rubs, or gallops  ABDOMEN: Soft, non-tender, non-distended; normal bowel sounds,   EXTREMITIES: No clubbing, cyanosis, or edema, Left Hip Dressing, clean and dry, denies pain   NEUROLOGY: A&O x 3, no focal deficits  SKIN: No rashes or lesions    LABS:                        9.6    8.70  )-----------( 199      ( 10 Sep 2024 09:50 )             30.0     09-10    135  |  97<L>  |  24<H>  ----------------------------<  283<H>  4.2   |  24  |  1.1    Ca    8.0<L>      10 Sep 2024 09:50  Mg     1.6     09-10    TPro  6.3  /  Alb  3.8  /  TBili  0.3  /  DBili  x   /  AST  17  /  ALT  10  /  AlkPhos  130<H>  09-10      Urinalysis Basic - ( 10 Sep 2024 09:50 )    Color: x / Appearance: x / SG: x / pH: x  Gluc: 283 mg/dL / Ketone: x  / Bili: x / Urobili: x   Blood: x / Protein: x / Nitrite: x   Leuk Esterase: x / RBC: x / WBC x   Sq Epi: x / Non Sq Epi: x / Bacteria: x

## 2024-09-16 ENCOUNTER — APPOINTMENT (OUTPATIENT)
Dept: ORTHOPEDIC SURGERY | Facility: CLINIC | Age: 74
End: 2024-09-16

## 2024-09-16 DIAGNOSIS — Z86.39 PERSONAL HISTORY OF OTHER ENDOCRINE, NUTRITIONAL AND METABOLIC DISEASE: ICD-10-CM

## 2024-09-16 DIAGNOSIS — Z96.642 PRESENCE OF LEFT ARTIFICIAL HIP JOINT: ICD-10-CM

## 2024-09-16 DIAGNOSIS — Z86.79 PERSONAL HISTORY OF OTHER DISEASES OF THE CIRCULATORY SYSTEM: ICD-10-CM

## 2024-09-16 DIAGNOSIS — S72.002A FRACTURE OF UNSPECIFIED PART OF NECK OF LEFT FEMUR, INITIAL ENCOUNTER FOR CLOSED FRACTURE: ICD-10-CM

## 2024-09-16 PROCEDURE — 99024 POSTOP FOLLOW-UP VISIT: CPT

## 2024-09-16 RX ORDER — GLIPIZIDE 2.5 MG/1
TABLET ORAL
Refills: 0 | Status: ACTIVE | COMMUNITY

## 2024-09-16 RX ORDER — METFORMIN HYDROCHLORIDE 625 MG/1
TABLET ORAL
Refills: 0 | Status: ACTIVE | COMMUNITY

## 2024-09-16 RX ORDER — METOPROLOL TARTRATE 75 MG/1
TABLET, FILM COATED ORAL
Refills: 0 | Status: ACTIVE | COMMUNITY

## 2024-09-17 PROBLEM — Z96.642 HISTORY OF TOTAL LEFT HIP REPLACEMENT: Status: ACTIVE | Noted: 2024-09-17

## 2024-09-17 PROBLEM — S72.002A CLOSED FRACTURE OF NECK OF LEFT FEMUR, INITIAL ENCOUNTER: Status: ACTIVE | Noted: 2024-09-17

## 2024-09-17 NOTE — HISTORY OF PRESENT ILLNESS
[de-identified] : 74-year-old s/p total left arthroplasty done on 9/9/2024.  Fracture of the left femoral neck sustained on 9/8/24 [de-identified] : Patient is doing well, and the pain is very well-controlled.  Patient presents in wheelchair and is overall very deconditioned.  She is currently in the rehab facility. [de-identified] : Patient demonstrates significant deconditioning and needs two-person assist to get from wheelchair to the table.  Left extremity is neurovascular intact.  Wound is healing well.  No drainage, no erythema. [de-identified] : None today [de-identified] : Wound was cleaned with chlorhexidine and Steri-Strips were applied.  Instructions were provided.  Patient is to continue physical therapy with safety education and fall prevention.  No resisted straight leg raises.  She is to continue DVT prophylaxis.  Patient can shower but to otherwise keep the wound clean and dry. [de-identified] : Follow-up in 2 weeks with a new x-ray.

## 2024-09-17 NOTE — HISTORY OF PRESENT ILLNESS
[de-identified] : 74-year-old s/p total left arthroplasty done on 9/9/2024.  Fracture of the left femoral neck sustained on 9/8/24 [de-identified] : Patient is doing well, and the pain is very well-controlled.  Patient presents in wheelchair and is overall very deconditioned.  She is currently in the rehab facility. [de-identified] : Patient demonstrates significant deconditioning and needs two-person assist to get from wheelchair to the table.  Left extremity is neurovascular intact.  Wound is healing well.  No drainage, no erythema. [de-identified] : None today [de-identified] : Wound was cleaned with chlorhexidine and Steri-Strips were applied.  Instructions were provided.  Patient is to continue physical therapy with safety education and fall prevention.  No resisted straight leg raises.  She is to continue DVT prophylaxis.  Patient can shower but to otherwise keep the wound clean and dry. [de-identified] : Follow-up in 2 weeks with a new x-ray.

## 2024-09-19 DIAGNOSIS — Z79.82 LONG TERM (CURRENT) USE OF ASPIRIN: ICD-10-CM
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
## 2024-09-19 DIAGNOSIS — M19.90 UNSPECIFIED OSTEOARTHRITIS, UNSPECIFIED SITE: ICD-10-CM

## 2024-09-19 DIAGNOSIS — Z79.84 LONG TERM (CURRENT) USE OF ORAL HYPOGLYCEMIC DRUGS: ICD-10-CM

## 2024-09-19 DIAGNOSIS — S72.002A FRACTURE OF UNSPECIFIED PART OF NECK OF LEFT FEMUR, INITIAL ENCOUNTER FOR CLOSED FRACTURE: ICD-10-CM

## 2024-09-19 DIAGNOSIS — Z88.8 ALLERGY STATUS TO OTHER DRUGS, MEDICAMENTS AND BIOLOGICAL SUBSTANCES: ICD-10-CM

## 2024-09-19 DIAGNOSIS — Z96.641 PRESENCE OF RIGHT ARTIFICIAL HIP JOINT: ICD-10-CM
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
## 2024-09-19 DIAGNOSIS — I10 ESSENTIAL (PRIMARY) HYPERTENSION: ICD-10-CM

## 2024-09-19 DIAGNOSIS — Z88.1 ALLERGY STATUS TO OTHER ANTIBIOTIC AGENTS: ICD-10-CM

## 2024-09-19 DIAGNOSIS — E78.5 HYPERLIPIDEMIA, UNSPECIFIED: ICD-10-CM
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
## 2024-09-19 DIAGNOSIS — R26.89 OTHER ABNORMALITIES OF GAIT AND MOBILITY: ICD-10-CM

## 2024-09-19 DIAGNOSIS — E11.9 TYPE 2 DIABETES MELLITUS WITHOUT COMPLICATIONS: ICD-10-CM

## 2024-09-23 LAB — SURGICAL PATHOLOGY STUDY: SIGNIFICANT CHANGE UP

## 2024-10-09 ENCOUNTER — APPOINTMENT (OUTPATIENT)
Dept: ORTHOPEDIC SURGERY | Facility: CLINIC | Age: 74
End: 2024-10-09
Payer: MEDICARE

## 2024-10-09 DIAGNOSIS — Z96.642 PRESENCE OF LEFT ARTIFICIAL HIP JOINT: ICD-10-CM

## 2024-10-09 PROCEDURE — 73502 X-RAY EXAM HIP UNI 2-3 VIEWS: CPT

## 2024-10-09 PROCEDURE — 99024 POSTOP FOLLOW-UP VISIT: CPT

## 2024-10-09 RX ORDER — AMOXICILLIN AND CLAVULANATE POTASSIUM 500; 125 MG/1; MG/1
500-125 TABLET, FILM COATED ORAL 3 TIMES DAILY
Qty: 21 | Refills: 0 | Status: ACTIVE | COMMUNITY
Start: 2024-10-09 | End: 1900-01-01

## 2024-10-16 ENCOUNTER — APPOINTMENT (OUTPATIENT)
Dept: ORTHOPEDIC SURGERY | Facility: CLINIC | Age: 74
End: 2024-10-16

## 2024-10-16 PROCEDURE — 99024 POSTOP FOLLOW-UP VISIT: CPT

## 2024-11-01 ENCOUNTER — APPOINTMENT (OUTPATIENT)
Dept: ORTHOPEDIC SURGERY | Facility: CLINIC | Age: 74
End: 2024-11-01

## 2024-12-06 ENCOUNTER — APPOINTMENT (OUTPATIENT)
Dept: ORTHOPEDIC SURGERY | Facility: CLINIC | Age: 74
End: 2024-12-06